# Patient Record
Sex: FEMALE | Race: WHITE | NOT HISPANIC OR LATINO | Employment: FULL TIME | URBAN - METROPOLITAN AREA
[De-identification: names, ages, dates, MRNs, and addresses within clinical notes are randomized per-mention and may not be internally consistent; named-entity substitution may affect disease eponyms.]

---

## 2017-03-20 ENCOUNTER — ALLSCRIPTS OFFICE VISIT (OUTPATIENT)
Dept: OTHER | Facility: OTHER | Age: 57
End: 2017-03-20

## 2017-10-03 DIAGNOSIS — N83.201 CYST OF RIGHT OVARY: ICD-10-CM

## 2017-10-03 DIAGNOSIS — Z12.31 ENCOUNTER FOR SCREENING MAMMOGRAM FOR MALIGNANT NEOPLASM OF BREAST: ICD-10-CM

## 2017-10-03 DIAGNOSIS — R10.2 PELVIC AND PERINEAL PAIN: ICD-10-CM

## 2017-10-03 DIAGNOSIS — R00.2 PALPITATIONS: ICD-10-CM

## 2017-10-03 DIAGNOSIS — F41.9 ANXIETY DISORDER: ICD-10-CM

## 2017-10-03 DIAGNOSIS — E55.9 VITAMIN D DEFICIENCY: ICD-10-CM

## 2017-10-03 DIAGNOSIS — D25.9 LEIOMYOMA OF UTERUS: ICD-10-CM

## 2017-10-03 DIAGNOSIS — R73.01 IMPAIRED FASTING GLUCOSE: ICD-10-CM

## 2017-10-04 ENCOUNTER — GENERIC CONVERSION - ENCOUNTER (OUTPATIENT)
Dept: OTHER | Facility: OTHER | Age: 57
End: 2017-10-04

## 2017-10-10 ENCOUNTER — HOSPITAL ENCOUNTER (OUTPATIENT)
Dept: RADIOLOGY | Facility: HOSPITAL | Age: 57
Discharge: HOME | End: 2017-10-10
Attending: FAMILY MEDICINE
Payer: COMMERCIAL

## 2017-10-10 DIAGNOSIS — Z12.31 ENCOUNTER FOR SCREENING MAMMOGRAM FOR MALIGNANT NEOPLASM OF BREAST: ICD-10-CM

## 2017-10-10 PROCEDURE — G0202 SCR MAMMO BI INCL CAD: HCPCS

## 2017-10-30 ENCOUNTER — GENERIC CONVERSION - ENCOUNTER (OUTPATIENT)
Dept: OTHER | Facility: OTHER | Age: 57
End: 2017-10-30

## 2017-10-30 ENCOUNTER — ALLSCRIPTS OFFICE VISIT (OUTPATIENT)
Dept: OTHER | Facility: OTHER | Age: 57
End: 2017-10-30

## 2017-10-30 LAB
BILIRUB UR QL STRIP: NORMAL
CLARITY UR: NORMAL
COLOR UR: YELLOW
GLUCOSE (HISTORICAL): NORMAL
HGB UR QL STRIP.AUTO: NORMAL
KETONES UR STRIP-MCNC: NORMAL MG/DL
LEUKOCYTE ESTERASE UR QL STRIP: 500
NITRITE UR QL STRIP: NORMAL
PH UR STRIP.AUTO: 5 [PH]
PROT UR STRIP-MCNC: NORMAL MG/DL
SP GR UR STRIP.AUTO: 1.01
UROBILINOGEN UR QL STRIP.AUTO: NORMAL

## 2017-10-31 LAB
CULTURE RESULT (HISTORICAL): NORMAL
INTERPRETATION (HISTORICAL): NORMAL
MISCELLANEOUS LAB TEST RESULT (HISTORICAL): NO GROWTH
NO LAVENDER RECEIVED (HISTORICAL): NORMAL
NO SERUM GEL RECEIVED (HISTORICAL): NORMAL

## 2017-11-01 ENCOUNTER — LAB CONVERSION - ENCOUNTER (OUTPATIENT)
Dept: OTHER | Facility: OTHER | Age: 57
End: 2017-11-01

## 2017-11-01 LAB
CONTAINER TYP (HISTORICAL): NORMAL
FINAL RESOLUTION (HISTORICAL): NORMAL
QUESTION/PROBLEM (HISTORICAL): NORMAL
SPECIMEN STATUS REPORT (HISTORICAL): NORMAL

## 2017-11-02 ENCOUNTER — HOSPITAL ENCOUNTER (OUTPATIENT)
Dept: RADIOLOGY | Facility: HOSPITAL | Age: 57
Discharge: HOME | End: 2017-11-02
Attending: FAMILY MEDICINE
Payer: COMMERCIAL

## 2017-11-02 ENCOUNTER — GENERIC CONVERSION - ENCOUNTER (OUTPATIENT)
Dept: OTHER | Facility: OTHER | Age: 57
End: 2017-11-02

## 2017-11-02 DIAGNOSIS — N83.201 CYST OF RIGHT OVARY: ICD-10-CM

## 2017-11-02 DIAGNOSIS — R10.2 PELVIC AND PERINEAL PAIN: ICD-10-CM

## 2017-11-02 LAB
A/G RATIO (HISTORICAL): 2 (ref 1.2–2.2)
ALBUMIN SERPL BCP-MCNC: 4.6 G/DL (ref 3.5–5.5)
ALP SERPL-CCNC: 104 IU/L (ref 39–117)
ALT SERPL W P-5'-P-CCNC: 17 IU/L (ref 0–32)
AMYLASE (HISTORICAL): 33 U/L (ref 31–124)
AST SERPL W P-5'-P-CCNC: 23 IU/L (ref 0–40)
BASOPHILS # BLD AUTO: 0 X10E3/UL (ref 0–0.2)
BASOPHILS # BLD AUTO: 1 %
BILIRUB SERPL-MCNC: 0.4 MG/DL (ref 0–1.2)
BUN SERPL-MCNC: 13 MG/DL (ref 6–24)
BUN/CREA RATIO (HISTORICAL): 23 (ref 9–23)
CALCIUM SERPL-MCNC: 9.5 MG/DL (ref 8.7–10.2)
CHLORIDE SERPL-SCNC: 101 MMOL/L (ref 96–106)
CHOLEST SERPL-MCNC: 205 MG/DL (ref 100–199)
CHOLEST/HDLC SERPL: 4.3 RATIO UNITS (ref 0–4.4)
CO2 SERPL-SCNC: 26 MMOL/L (ref 18–29)
CREAT SERPL-MCNC: 0.57 MG/DL (ref 0.57–1)
DEPRECATED RDW RBC AUTO: 12.7 % (ref 12.3–15.4)
EGFR AFRICAN AMERICAN (HISTORICAL): 120 ML/MIN/1.73
EGFR-AMERICAN CALC (HISTORICAL): 104 ML/MIN/1.73
EOSINOPHIL # BLD AUTO: 0.1 X10E3/UL (ref 0–0.4)
EOSINOPHIL # BLD AUTO: 3 %
GLUCOSE SERPL-MCNC: 107 MG/DL (ref 65–99)
HBA1C MFR BLD HPLC: 5.3 % (ref 4.8–5.6)
HCT VFR BLD AUTO: 40.4 % (ref 34–46.6)
HDLC SERPL-MCNC: 48 MG/DL
HGB BLD-MCNC: 13.6 G/DL (ref 11.1–15.9)
IMM.GRANULOCYTES (CD4/8) (HISTORICAL): 0 %
IMM.GRANULOCYTES (CD4/8) (HISTORICAL): 0 X10E3/UL (ref 0–0.1)
LDLC SERPL CALC-MCNC: 129 MG/DL (ref 0–99)
LIPASE SERPL-CCNC: 19 U/L (ref 14–72)
LYMPHOCYTES # BLD AUTO: 1.8 X10E3/UL (ref 0.7–3.1)
LYMPHOCYTES # BLD AUTO: 35 %
MCH RBC QN AUTO: 30.8 PG (ref 26.6–33)
MCHC RBC AUTO-ENTMCNC: 33.7 G/DL (ref 31.5–35.7)
MCV RBC AUTO: 92 FL (ref 79–97)
MONOCYTES # BLD AUTO: 0.4 X10E3/UL (ref 0.1–0.9)
MONOCYTES (HISTORICAL): 8 %
NEUTROPHILS # BLD AUTO: 2.9 X10E3/UL (ref 1.4–7)
NEUTROPHILS # BLD AUTO: 53 %
PLATELET # BLD AUTO: 311 X10E3/UL (ref 150–379)
POTASSIUM SERPL-SCNC: 4.4 MMOL/L (ref 3.5–5.2)
RBC (HISTORICAL): 4.41 X10E6/UL (ref 3.77–5.28)
SODIUM SERPL-SCNC: 143 MMOL/L (ref 134–144)
TOT. GLOBULIN, SERUM (HISTORICAL): 2.3 G/DL (ref 1.5–4.5)
TOTAL PROTEIN (HISTORICAL): 6.9 G/DL (ref 6–8.5)
TRIGL SERPL-MCNC: 142 MG/DL (ref 0–149)
VLDLC SERPL CALC-MCNC: 28 MG/DL (ref 5–40)
WBC # BLD AUTO: 5.3 X10E3/UL (ref 3.4–10.8)

## 2017-11-02 PROCEDURE — 76830 TRANSVAGINAL US NON-OB: CPT

## 2017-11-02 PROCEDURE — 76856 US EXAM PELVIC COMPLETE: CPT

## 2017-11-03 ENCOUNTER — LAB CONVERSION - ENCOUNTER (OUTPATIENT)
Dept: OTHER | Facility: OTHER | Age: 57
End: 2017-11-03

## 2017-11-03 LAB
25(OH)D3 SERPL-MCNC: 42.8 NG/ML (ref 30–100)
ADDITIONAL INFORMATION (HISTORICAL): NORMAL
ADEQUACY: (HISTORICAL): NORMAL
COMMENT (HISTORICAL): NORMAL
CYTOTECHNOLOGIST: (HISTORICAL): NORMAL
INTERPRETATION (HISTORICAL): NORMAL
INTERPRETATION (HISTORICAL): NORMAL
LMP (HISTORICAL): NORMAL
PREV. BX: (HISTORICAL): NORMAL
PREV. PAP (HISTORICAL): NORMAL
SOURCE (HISTORICAL): NORMAL
TSH SERPL DL<=0.05 MIU/L-ACNC: 1.53 UIU/ML (ref 0.45–4.5)

## 2017-11-26 ENCOUNTER — GENERIC CONVERSION - ENCOUNTER (OUTPATIENT)
Dept: OTHER | Facility: OTHER | Age: 57
End: 2017-11-26

## 2018-01-10 NOTE — RESULT NOTES
Verified Results  * MAMMO SCREENING BILATERAL W CAD [id number] 04:12PM Zbigniew Piña Order Number: [id number]    - Patient Instructions: To schedule this appointment, please contact Central Scheduling at [phone]  Do not wear any perfume, powder, lotion or deodorant on breast or underarm area  Please bring your doctors order, referral (if needed) and insurance information with you on the day of the test  Failure to bring this information may result in this test being rescheduled  Arrive 15 minutes prior to your appointment time to register  On the day of your test, please bring any prior mammogram or breast studies with you that were not performed at a [facility]  Failure to bring prior exams may result in your test needing to be rescheduled  Test Name Result Flag Reference   MAMMO SCREENING BILATERAL W CAD (Report)     Patient History:   Patient has history of other cancer at age 44  Family history of unknown cancer at age 68 in mother, unknown    cancer at age 72 in maternal grandfather  Benign FNA biopsy of the right breast, 2009  Patient's BMI is 29 0  Reason for exam: screening, asymptomatic  Screening     Mammo Screening Bilateral W CAD: October 10, 2017 - Check In #:    [de-identified]   Bilateral CC and MLO view(s) were taken  Technologist: TONYA Zuluaga   Prior study comparison: September 30, 2016, mammo screening    bilateral W CAD performed at [place] [address]  September 29, 2015, [facility] screening bilateral digital demetria    performed at [place] [address]  September 26, 2014, bilateral screening mammogram performed at [place] [address]  September 25, 2013, bilateral    screening mammogram performed at [place] [address]  September 21, 2012, bilateral screening mammogram    performed at [place] [address]  The breast tissue is almost entirely fat   No dominant soft    tissue mass, architectural distortion or suspicious    calcifications are noted  The skin and nipple contours are    within normal limits  No evidence of malignancy  No significant   change when compared to the prior studies  1  No evidence of malignancy  2  No significant change when compared with the prior study  ACR BI-RADSï¾® Assessments: BiRad:1 - Negative     Recommendation:   Routine screening mammogram of both breasts in 1 year  Analyzed by CAD     The patient is scheduled in a reminder system for screening    mammography  8-10% of cancers will be missed on mammography  Management of a    palpable abnormality must be based on clinical grounds  Patients   will be notified of their results via letter from our facility  Accredited by [employer] and FDA       Transcription Location: [facility] 98: [id number]     Risk Value(s):   Tyrer-Cuzick 10 Year: 3 800%, Tyrer-Cuzick Lifetime: 11 900%,    Myriad Table: 1 5%, GRABIEL 5 Year: 1 9%, NCI Lifetime: 12 2%   Signed by:   Mojgan Yancey MD   10/10/17
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
## 2018-01-11 NOTE — PROGRESS NOTES
Chief Complaint  3rd Hep B injection given  bh/lpn      Active Problems    1  _ (789 0)   2  _ (923 00)   3  Anxiety (300 00) (F41 9)   4  Benign paroxysmal vertigo, unspecified laterality (386 11) (H81 10)   5  Body mass index (BMI) of 37 0 to 37 9 in adult (V85 37) (Z68 37)   6  Borderline hyperlipidemia (272 4) (E78 5)   7  Contusion Of The Leg With Intact Skin Surface (924 10)   8  Encounter for screening for malignant neoplasm of cervix (V76 2) (Z12 4)   9  Encounter for screening mammogram for malignant neoplasm of breast (V76 12)   (Z12 31)   10  Female pelvic pain (625 9) (R10 2)   11  Follicular cyst of ovary (620 0) (N83 00)   12  Impaired fasting glucose (790 21) (R73 01)   13  Leiomyoma of uterus (218 9) (D25 9)   14  Need for hepatitis B vaccination (V05 3) (Z23)   15  Need for prophylactic vaccination and inoculation against influenza (V04 81) (Z23)   16  Other Neurological Disorders (781 99)   17  Palpitations (785 1) (R00 2)   18  Vitamin D deficiency (268 9) (E55 9)   19  Well adult on routine health check (V70 0) (Z00 00)    Current Meds   1  ALPRAZolam 0 25 MG Oral Tablet Dispersible; Take 1 tablet daily when needed; Therapy: (Recorded:29Xmp2915) to Recorded    Allergies    1   No Known Drug Allergies    Plan  Need for hepatitis B vaccination    · Hepatitis B    Signatures   Electronically signed by : Marilu Lockhart DO; Dec 12 2016  8:57AM EST                       (Author)

## 2018-01-12 NOTE — RESULT NOTES
Discussion/Summary   overall results good-please call w further questions/concerns-Best Regards-Dr Gilmore Innocent     Verified Results  * US PELVIS COMPLETE [facility] AND TRANSVAGINAL) [id number] 09:52AM Enmanuel Larios Order Number: [id number]    - Patient Instructions: To schedule this appointment, please contact Central Scheduling at [phone]  Test Name Result Flag Reference   US PELVIS COMPLETE (TRANSABDOMINAL AND TRANSVAGINAL) (Report)     PELVIC ULTRASOUND, COMPLETE     INDICATION: Right-sided pain  LMP was 6-8 months ago      COMPARISON: 7/22/2016     TECHNIQUE:  Transabdominal pelvic ultrasound was performed in sagittal and transverse planes with a curvilinear transducer  Additional transvaginal imaging was performed to better evaluate the endometrium and ovaries  Imaging included volumetric    sweeps as well as traditional still imaging technique  FINDINGS:     UTERUS:   The uterus is normal in position, measuring 10 9 x 3 3 x 6 3 cm  Contour and echotexture appear normal  On the right side of the uterus and intramural fibroid measures 2 1 x 1 7 x 1 9 cm, previously 1 5 x 1 3 x 1 5     The cervix shows no suspicious abnormality  ENDOMETRIUM:    Normal caliber of 6 mm  Homogenous and normal in appearance  OVARIES/ADNEXA:   Right ovary: 2 3 x 1 0 x 2 1 cm  Seen on the transabdominal images  No suspicious right ovarian abnormality  Doppler flow within normal limits  Left ovary: 2 1 x 1 0 x 1 8 cm  No suspicious left ovarian abnormality  Doppler flow within normal limits  No suspicious adnexal mass or loculated collections  There is no free fluid  IMPRESSION:        1  Fibroid uterus  2  Normal examination of the ovaries            Workstation performed: [id number]     Signed by:   Sandy Can MD   11/3/17     (1) AMYLASE [id number] 08:46AM Venetta Antes     Test Name Result Flag Reference   Amylase, Serum 33 U/L       (1) CBC/PLT/DIFF [id number] 08:46AM [place] [employer]     Test Name Result Flag Reference   WBC 5 3 x10E3/uL  3 4-10 8   RBC 4 41 x10E6/uL  3 77-5 28   Hemoglobin 13 6 g/dL  11 1-15 9   Hematocrit 40 4 %  34 0-46  6   MCV 92 fL  79-97   MCH 30 8 pg  26 6-33 0   MCHC 33 7 g/dL  31 5-35 7   RDW 12 7 %  12 3-15 4   Platelets 893 H06R4/QF  150-379   Neutrophils 53 %  Not Estab  Lymphs 35 %  Not Estab  Monocytes 8 %  Not Estab  Eos 3 %  Not Estab  Basos 1 %  Not Estab  Neutrophils (Absolute) 2 9 x10E3/uL  1 4-7 0   Lymphs (Absolute) 1 8 x10E3/uL  0 7-3 1   Monocytes(Absolute) 0 4 x10E3/uL  0 1-0 9   Eos (Absolute) 0 1 x10E3/uL  0 0-0 4   Baso (Absolute) 0 0 x10E3/uL  0 0-0 2   Immature Granulocytes 0 %  Not Estab     Immature Grans (Abs) 0 0 x10E3/uL  0 0-0 1     (1) COMPREHENSIVE METABOLIC PANEL [id number] 31:17SS [place] [employer]     Test Name Result Flag Reference   Glucose, Serum 107 mg/dL H 65-99   BUN 13 mg/dL  6-24   Creatinine, Serum 0 57 mg/dL  0 57-1 00   BUN/Creatinine Ratio 23  9-23   Sodium, Serum 143 mmol/L  134-144   Potassium, Serum 4 4 mmol/L  3 5-5 2   Chloride, Serum 101 mmol/L     Carbon Dioxide, Total 26 mmol/L  18-29   Calcium, Serum 9 5 mg/dL  8 7-10 2   Protein, Total, Serum 6 9 g/dL  6 0-8 5   Albumin, Serum 4 6 g/dL  3 5-5 5   Globulin, Total 2 3 g/dL  1 5-4 5   A/G Ratio 2 0  1 2-2 2   Bilirubin, Total 0 4 mg/dL  0 0-1 2   Alkaline Phosphatase, S 104 IU/L     AST (SGOT) 23 IU/L  0-40   ALT (SGPT) 17 IU/L  0-32   eGFR If NonAfricn Am 104 mL/min/1 73  >59   eGFR If Africn Am 120 mL/min/1 73  >59     (1) HEMOGLOBIN A1C [id number] 08:46AM [employer]     Test Name Result Flag Reference   Hemoglobin A1c 5 3 %  4 8-5 6   Pre-diabetes: 5 7 - 6 4           Diabetes: >6 4           Glycemic control for adults with diabetes: <7 0     (1) LIPASE [id number] 08:46AM [employer]     Test Name Result Flag Reference   Lipase, Serum 19 U/L  14-72     (1) LIPID PANEL, FASTING [id number] 08: 46AM Francesca Villegas     Test Name Result Flag Reference   Cholesterol, Total 205 mg/dL H 100-199   Triglycerides 142 mg/dL  0-149   HDL Cholesterol 48 mg/dL  >39   VLDL Cholesterol Kev 28 mg/dL  5-40   LDL Cholesterol Calc 129 mg/dL H 0-99   T  Chol/HDL Ratio 4 3 ratio units  0 0-4 4   T  Chol/HDL Ratio                                                             Men  Women                                               1/2 Avg  Risk  3 4    3 3                                                   Avg Risk  5 0    4 4                                                2X Avg  Risk  9 6    7 1                                                3X Avg  Risk 23 4   11 0     (1) VITAMIN D 25-HYDROXY [id number] 08:46AM Francesca Villegas     Test Name Result Flag Reference   Vitamin D, 25-Hydroxy 42 8 ng/mL  30 0-100 0   Vitamin D deficiency has been defined by the [address] practice guideline as a  level of serum 25-OH vitamin D less than 20 ng/mL (1,2)  The Endocrine Society went on to further define vitamin D  insufficiency as a level between 21 and 29 ng/mL (2)  1  IOM ([place] of Medicine)  2010  Dietary reference     intakes for calcium and D  [facility]: The     [employer]  2  Kajal MF, Denise FONG, Amrit BARAJAS, et al      Evaluation, treatment, and prevention of vitamin D     deficiency: an Endocrine Society clinical practice     guideline  JCEM  2011 Jul; 96(7):1911-30  (LC) TSH Rfx on Abnormal to Free T4 [id number] 08:46AM Francesca Villegas     Test Name Result Flag Reference   TSH 1 530 uIU/mL  0 450-4 500     [facility]) Cardiovascular Risk Assessment [id number] 08:46AM Francesca Villegas     Test Name Result Flag Reference   Interpretation Note     Medical Director's Note: Patient First Name has been  corrected on 11/3/2017, was [facility] and now is Harjeet Jones   Please  review this report in its entirety, since changes to patient  demographics may affect result interpretation(s) and/or  treatment/follow-up suggestions   -------------------------------  CARDIOVASCULAR REPORT:  -------------------------------  Current available clinical information suggests the  patient's risk is at least LOW  One major CHD risk factor is  present (age over 54)  If the patient has CHD or a CHD risk  equivalent, the risk category is high  If patient does not  have CHD or a CHD risk equivalent, consider use of the  Pooled Cohort Equations to estimate 10-year CVD risk, as  individuals with greater than 7 5% risk may warrant more  intensive therapy  The calculator can be found at:  http://tools  cardiosource org/SXYHQ-Hhfk-Ysfekywjj/  -  Insulin resistance, obesity, excessive alcohol use, smoking,  nephrotic syndrome, liver disease, and certain medications  can cause secondary dyslipidemia  Consider evaluation if  clinically indicated  -  Therapeutic lifestyle changes are always valuable to achieve  optimal blood lipid status (diet, exercise, weight  management)  -------------------------------  LIPID MANAGEMENT  Select one patient risk category based upon medical history  and clinical judgment  Additional risk factors such as  personal or family history of premature CHD, smoking, and  hypertension modify a patient's goals of therapy  In CVD  prevention, the intensity of therapy should be adjusted to  the level of patient risk  MODERATE intensity statin therapy  generally results in an average LDL-C reduction of 30% to  less than 50% from the untreated baseline  Examples include  (daily doses): atorvastatin 10-20 mg, rosuvastatin 5-10 mg,  simvastatin 20-40 mg, pravastatin 40-80 mg, lovastatin 40  mg  HIGH intensity statin therapy generally results in an  average LDL-C reduction of 50% or more from the untreated  baseline   Examples include (daily doses): atorvastatin 40-80  mg and rosuvastatin 20 mg   -------------------------------  LOW RISK ASSESSMENT AND TREATMENT SUGGESTIONS  -------------------------------  LDL-C is acceptable, was 114 and now is 129 mg/dL  Non-HDL  Cholesterol is acceptable, was 129 and now is 157 mg/dL  -  Considerations for use of statin therapy include family  history of premature atherosclerotic disease, elevated  coronary artery calcium score, ankle-brachial index < 0 9,  elevated CRP, or elevated 10-year or lifetime CVD risk   -------------------------------  INTERMEDIATE RISK ASSESSMENT AND TREATMENT SUGGESTIONS  -------------------------------  LDL-C is acceptable, was 114 and now is 129 mg/dL  Non-HDL  Cholesterol is acceptable, was 129 and now is 157 mg/dL  -  Consider measurement of LDL particle number or Apo B to  adjudicate need for further LDL lowering therapy  Consider  beginning or increasing statin  Factors that may influence  statin use include family history of premature  atherosclerotic disease, elevated coronary artery calcium  score, ankle-brachial index < 0 9, elevated CRP, or elevated  10-year or lifetime CVD risk  If statin cannot be tolerated  or increased, alternatives include use of an intestinal  agent (ezetimibe or bile acid sequestrant) or niacin   -------------------------------  HIGH RISK ASSESSMENT AND TREATMENT SUGGESTIONS  -------------------------------  LDL-C is borderline high, was 114 and now is 129 mg/dL  Non-HDL Cholesterol is borderline high, was 129 and now is  157 mg/dL  -  Begin statin  If statin already in use, consider increasing  dose to achieve at least a 50% LDL reduction from baseline  Moderate or high intensity statin is preferred   If statin  cannot be tolerated or increased, alternatives include use  of an intestinal agent (ezetimibe or bile acid sequestrant)  or niacin   -------------------------------  DISCLAIMER  These assessments and treatment suggestions are provided as  a convenience in support of the physician-patient  relationship and are not intended to replace the physician's  clinical judgment  They are derived from national guidelines  in addition to other evidence and expert opinion  The  clinician should consider this information within the  context of clinical opinion and the individual patient  SEE GUIDANCE FOR CARDIOVASCULAR REPORT: Richa Cross et al  2013  ACC/AHA guideline on the treatment of blood cholesterol to  reduce atherosclerotic cardiovascular risk in adults: a  report of the [employer] of Cardiology/American Heart  Association Task Force on Practice Guidelines  Circulation  2014; 129 (suppl 2): S1?S45; Lanieois et al  Clin Chem 2009;  55(3):407-419; Lux et al  Diabetes Care 2008;  31(4):811-82  PDF Image   [facility]) Urine Culture, Routine [id number] 12:00AM Napolean Darner     Test Name Result Flag Reference   Urine Culture, Routine Final report     Result 1 No growth       TEST IN QUESTION - CYTOLOGY [id number] 12:00AM Napolean Darner     Test Name Result Flag Reference   STATUS FINAL     CONTAINER TYPE: TP     QUESTION/PROBLEM CLARIFY HX     FINAL RESOLUTION ENDOCX       (Q) THINPREP TIS PAP RFX HPV [id number] 12:00AM Napolean [facility]     Test Name Result Flag Reference   CLINICAL INFORMATION: NONE GIVEN     LMP: NONE GIVEN     PREV  PAP: NONE GIVEN     PREV  BX: NONE GIVEN     SOURCE: ENDOCERVIX     STATEMENT OF ADEQUACY:      Satisfactory for evaluation  Endocervical/transformation zone component  present  Age and/or menstrual status not provided   INTERPRETATION/RESULT:      Negative for intraepithelial lesion or malignancy  COMMENT:      This Pap test has been evaluated with computer  assisted technology  CYTOTECHNOLOGIST:      [id number] Asencio(ASCP)  CT screening location: [address] [facility], [address] [place] [address]  Encounter for screening for malignant neoplasm of cervix    · (Q) THINPREP TIS PAP RFX HPV ; every 1 year;  Last 28LQL4418; Next 33CLF4724;  Status:Active

## 2018-01-12 NOTE — RESULT NOTES
Verified Results  (LC) Vitamin D, 25-Hydroxy, Total 25Pgf7471 08:20AM [facility]     Test Name Result Flag Reference   Vitamin D, 25-Hydroxy, Serum 36 ng/mL     Reference Range:   All Ages: Target levels 30 - 100     TEST IN QUESTION - CYTOLOGY [id number] 12:00AM [facility]     Test Name Result Flag Reference   STATUS FINAL     CONTAINER TYPE: TP     QUESTION/PROBLEM CLINICAL HX/NAME     FINAL RESOLUTION      SOURCE=CERVICAL                         CORRECT LAST NAME IS Alexander Jane
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
## 2018-01-13 VITALS
HEART RATE: 100 BPM | OXYGEN SATURATION: 98 % | SYSTOLIC BLOOD PRESSURE: 134 MMHG | BODY MASS INDEX: 39.85 KG/M2 | HEIGHT: 60 IN | WEIGHT: 203 LBS | RESPIRATION RATE: 16 BRPM | TEMPERATURE: 98 F | DIASTOLIC BLOOD PRESSURE: 84 MMHG

## 2018-01-13 VITALS
BODY MASS INDEX: 38.87 KG/M2 | RESPIRATION RATE: 16 BRPM | DIASTOLIC BLOOD PRESSURE: 88 MMHG | TEMPERATURE: 98.2 F | SYSTOLIC BLOOD PRESSURE: 126 MMHG | HEART RATE: 92 BPM | WEIGHT: 198 LBS | HEIGHT: 60 IN

## 2018-01-13 NOTE — PROGRESS NOTES
Chief Complaint  Patient presents for Hepatitis B injection into right deltoid  nil/lpn      Active Problems    1  _ (789 0)   2  _ (923 00)   3  Anxiety (300 00) (F41 9)   4  Benign paroxysmal vertigo, unspecified laterality (386 11) (H81 10)   5  Body mass index (BMI) of 37 0 to 37 9 in adult (V85 37) (Z68 37)   6  Borderline hyperlipidemia (272 4) (E78 5)   7  Contusion Of The Leg With Intact Skin Surface (924 10)   8  Encounter for screening for malignant neoplasm of cervix (V76 2) (Z12 4)   9  Encounter for screening mammogram for malignant neoplasm of breast (V76 12)   (Z12 31)   10  Female pelvic pain (625 9) (R10 2)   11  Follicular cyst of ovary (620 0) (N83 0)   12  Impaired fasting glucose (790 21) (R73 01)   13  Leiomyoma of uterus (218 9) (D25 9)   14  Need for hepatitis B vaccination (V05 3) (Z23)   15  Need for prophylactic vaccination and inoculation against influenza (V04 81) (Z23)   16  Other Neurological Disorders (781 99)   17  Palpitations (785 1) (R00 2)   18  Vitamin D deficiency (268 9) (E55 9)   19  Well adult on routine health check (V70 0) (Z00 00)    Current Meds   1  ALPRAZolam 0 25 MG Oral Tablet Dispersible; Take 1 tablet daily when needed; Therapy: (Recorded:86Kan8788) to Recorded    Allergies    1   No Known Drug Allergies    Plan  Need for hepatitis B vaccination    · Hepatitis B    Future Appointments    Date/Time Provider Specialty Site   12/09/2016 01:00 PM [facility], Nurse Schedule  [facility]     Signatures   Electronically signed by : COLLINS Davila ; Aug 10 2016  3:01PM EST                       (Author)

## 2018-01-14 NOTE — RESULT NOTES
Verified Results  * US PELVIS COMPLETE (TRANSABDOMINAL AND TRANSVAGINAL) [id number] 10:26AM Mia Slider     Test Name Result Flag Reference   US PELVIS COMPLETE (TRANSABDOMINAL AND TRANSVAGINAL) (Report)     PELVIC ULTRASOUND, COMPLETE     INDICATION: Pelvic pain for years  Patient is postmenopausal for approximately one year  COMPARISON: 8/4/2014     TECHNIQUE:  Transabdominal pelvic ultrasound was performed in sagittal and transverse planes with a curvilinear transducer  Additional transvaginal imaging was performed to better evaluate the endometrium and ovaries  Imaging included volumetric    sweeps as well as traditional still imaging technique  FINDINGS:     UTERUS:   The uterus is normal in position, measuring 11 4 x 4 3 x 5 4 cm  Contour and echotexture appear normal  In the anterior right side of the uterus a fibroid measures 1 5 cm in diameter  The cervix shows no suspicious abnormality  ENDOMETRIUM:    Normal caliber of 10 mm  Homogenous and normal in appearance  OVARIES/ADNEXA:   Right ovary: 2 5 x 3 0 x 1 1 cm  No suspicious right ovarian abnormality  1 6 x 2 1 x 2 4 cm cyst    Doppler flow within normal limits  Left ovary: 1 7 x 1 2 x 2 0 cm  No suspicious left ovarian abnormality  Doppler flow within normal limits  No suspicious adnexal mass or loculated collections  There is no free fluid  IMPRESSION:      Fibroid uterus  Right ovarian cyst           Workstation performed: [id number]     Signed by:   Silvia Mitchell MD   7/25/16     (Q) THINPREP PAP [id number] 12:00AM Mia Slider     Test Name Result Flag Reference   CLINICAL INFORMATION:      NONE GIVEN   LMP:      NONE GIVEN   PREV  PAP:      NONE GIVEN   PREV  BX:      NONE GIVEN   SOURCE:      CERVICAL   STATEMENT OF ADEQUACY:      Satisfactory for evaluation  Endocervical/transformation zone component  present    Age and/or menstrual status not provided   INTERPRETATION/RESULT: Negative for intraepithelial lesion or malignancy  CYTOTECHNOLOGIST:      ENRIQUE Ivy(ASCP)  CT screening location: [address], [address] [facility]  Encounter for screening for malignant neoplasm of cervix    · (Q) THINPREP TIS PAP RFX HPV ; every 1 year;  Last [id number]; Status:Active

## 2018-01-15 NOTE — RESULT NOTES
Verified Results  * MAMMO SCREENING BILATERAL W CAD [id number] 02:15PM Domenica Bazan     Test Name Result Flag Reference   MAMMO SCREENING BILATERAL W CAD (Report)     Patient History:   Patient has history of other cancer at age 44  Family history of unknown cancer in maternal grandfather at age    72 and unknown cancer in mother at age 68  Benign FNA biopsy of the right breast, 2009  Patient's BMI is 29 0  Reason for exam: screening (asymptomatic)  Screening     Mammo Screening Bilateral W CAD: September 30, 2016 - Check In #:   [de-identified]   Bilateral CC and MLO view(s) were taken  Technologist: GEMMA Burt   Prior study comparison: September 29, 2015, [facility] screening    bilateral digital demetria performed at [place] [address]  September 26, 2014, bilateral screening mammogram    performed at [place] [address]  September 25, 2013, bilateral screening mammogram performed at [place] [address]  September 21, 2012, bilateral    screening mammogram performed at [place] [address]  September 20, 2011, right breast diagnostic mammogram    performed at [place] [address]  September 20, 2011, bilateral ultrasound performed at [place] [address]  September 12, 2011, right breast screening    mammogram performed at [place] [address]  September 9, 2010, bilateral screening mammogram performed at [place] [address]  August 28, 2009, left breast    diagnostic mammogram performed at [place] [address]  The breast tissue is almost entirely fat  Bilateral digital    mammography is performed  No dominant soft tissue mass,    architectural distortion or suspicious calcifications are noted  The skin and nipple contours are within normal limits  Scattered benign appearing calcifications are noted   Asymmetric    tissue in the right upper outer breast is again seen which has    been intermittent dating back to August 2009  No evidence of    malignancy  No significant changes when compared to prior    studies  1  No evidence of malignancy  2  No significant change when compared with the prior study  ASSESSMENT: BiRad:2 - Benign     Recommendation:   Routine screening mammogram of both breasts in 1 year  A reminder letter will be scheduled   Analyzed by CAD     8-10% of cancers will be missed on mammography  Management of a    palpable abnormality must be based on clinical grounds  Patients   will be notified of their results via letter from our facility  Accredited by [employer] of [employer] and FDA       Transcription Location:  [address]: [id number]     Risk Value(s):   Tyrer-Cuzick 10 Year: 3 718%, Tyrer-Cuzick Lifetime: 12 225%,    Myriad Table: 1 5%, GRABIEL 5 Year: 1 8%, NCI Lifetime: 12 4%   Signed by:   Chapo Sims MD   10/4/16

## 2018-01-15 NOTE — MISCELLANEOUS
Message  Return to work or school:   Leann Estrella is under my professional care  She was seen in my office on 10/30/2017        Dr Ivett Marino/ ashley/van        Signatures   Electronically signed by : COLLINS Nascimento ; Nov 2 2017  7:42AM EST                       (Author)

## 2018-01-16 NOTE — PROGRESS NOTES
Chief Complaint  Patient presents for annual PPD  nil/lpn      Active Problems    1  _ (789 0)   2  _ (923 00)   3  Acute bronchitis (466 0) (J20 9)   4  Anxiety (300 00) (F41 9)   5  Benign paroxysmal vertigo, unspecified laterality (386 11) (H81 10)   6  Contusion Of The Leg With Intact Skin Surface (924 10)   7  Encounter for PPD test (V74 1) (Z11 1)   8  Encounter for screening for malignant neoplasm of cervix (V76 2) (Z12 4)   9  Encounter for screening mammogram for malignant neoplasm of breast (V76 12)   (Z12 31)   10  Follicular cyst of ovary (620 0) (N83 0)   11  Impaired fasting glucose (790 21) (R73 01)   12  Leiomyoma of uterus (218 9) (D25 9)   13  Need for prophylactic vaccination and inoculation against influenza (V04 81) (Z23)   14  Other Neurological Disorders (781 99)   15  Palpitations (785 1) (R00 2)   16  Sore throat (462) (J02 9)   17  Vitamin D deficiency (268 9) (E55 9)   18  Well adult on routine health check (V70 0) (Z00 00)    Current Meds   1  Azithromycin 250 MG Oral Tablet; TAKE 2 TABLETS ON DAY 1 THEN TAKE 1 TABLET A   DAY FOR 4 DAYS; Therapy: 18Ohb4303 to (Last Rx:69Nre6303) Ordered   2  Guaifenesin-Codeine 100-10 MG/5ML Oral Solution; TAKE 5 ML EVERY 4 TO 6 HOURS   AS NEEDED FOR COUGH; Therapy: 94Gyc1977 to (Last Rx:95Ylb0906) Ordered    Allergies    1  No Known Drug Allergies    Plan  Encounter for PPD test    · PPD    Future Appointments    Date/Time Provider Specialty Site   07/13/2016 01:30 PM COLLINS Cordova   [address] [place] [address]   07/01/2016 09:00 AM [facility], Nurse Schedule  [facility]     Signatures   Electronically signed by : COLLINS Aponte ; Jun 28 2016  4:58PM EST                       (Author)

## 2018-01-17 NOTE — PROGRESS NOTES
Chief Complaint  Patient here for PPD read, reading negative 0 00mm bh/lpn      Active Problems    1  _ (789 0)   2  _ (923 00)   3  Acute bronchitis (466 0) (J20 9)   4  Anxiety (300 00) (F41 9)   5  Benign paroxysmal vertigo, unspecified laterality (386 11) (H81 10)   6  Contusion Of The Leg With Intact Skin Surface (924 10)   7  Encounter for PPD test (V74 1) (Z11 1)   8  Encounter for screening for malignant neoplasm of cervix (V76 2) (Z12 4)   9  Encounter for screening mammogram for malignant neoplasm of breast (V76 12)   (Z12 31)   10  Follicular cyst of ovary (620 0) (N83 0)   11  Impaired fasting glucose (790 21) (R73 01)   12  Leiomyoma of uterus (218 9) (D25 9)   13  Need for prophylactic vaccination and inoculation against influenza (V04 81) (Z23)   14  Other Neurological Disorders (781 99)   15  Palpitations (785 1) (R00 2)   16  Sore throat (462) (J02 9)   17  Vitamin D deficiency (268 9) (E55 9)   18  Well adult on routine health check (V70 0) (Z00 00)    Current Meds   1  Azithromycin 250 MG Oral Tablet; TAKE 2 TABLETS ON DAY 1 THEN TAKE 1 TABLET A   DAY FOR 4 DAYS; Therapy: 63Taa6199 to (Last Rx:34Vhh5737) Ordered   2  Guaifenesin-Codeine 100-10 MG/5ML Oral Solution; TAKE 5 ML EVERY 4 TO 6 HOURS   AS NEEDED FOR COUGH; Therapy: 48Owq2032 to (Last Rx:05Hjt6115) Ordered    Allergies    1  No Known Drug Allergies    Plan  Encounter for PPD test    · PPD    Future Appointments    Date/Time Provider Specialty Site   07/13/2016 01:30 PM COLLINS Noel   [facility] [address]     Signatures   Electronically signed by : COLLINS Starr ; Jul 1 2016  8:36AM EST                       (Author)

## 2018-01-18 NOTE — RESULT NOTES
Verified Results  Urine Dip Automated- POC [id number] 01:17PM Megan Gomez     Test Name Result Flag Reference   Color Yellow     Clarity Transparent     Leukocytes 500     Nitrite neg     Blood neg     Bilirubin neg     Urobilinogen norm     Protein neg     Ph 5     Specific Gravity 1 010     Ketone neg     Glucose norm       (LC) No Serum Gel Received [id number] 12:00AM Megan Gomez     Test Name Result Flag Reference   No Serum Gel Received NG6     No serum gel received  TEST:  [id number]  Comp  Metabolic Panel (14)               933640  Lipid Panel w/ Chol/HDL Ratio               353691  Vitamin D, 25-Hydroxy               552207  Amylase, Serum               466681  Lipase, Serum     (LC) NO Lavender Received 25BJX4437 12:00AM Megan Gomez     Test Name Result Flag Reference   No Lavender Received NOLAV     No lavender top tube submitted  TEST:  [id number]  CBC With Differential/Platelet               709548  Hemoglobin A1c     (LC) Cardiovascular Risk Assessment 30Oct2017 12:00AM Megan Gomez     Test Name Result Flag Reference   Interpretation Not applicable     A [employer] interpretive report has not been generated  since ordered tests are not currently relevant to the  program    PDF Image Not applicable         Plan  Anxiety, BMI 38 0-38 9,adult, Borderline hyperlipidemia, Female pelvic pain, Impaired  fasting glucose, Leiomyoma of uterus, Palpitations, Right ovarian cyst, Vitamin  D deficiency    · (1) LIPID PANEL, FASTING; Status:Active; Requested ZGT:89JZR3754; Anxiety, BMI 38 0-38 9,adult, Female pelvic pain, Impaired fasting glucose, Leiomyoma  of uterus, Palpitations, Right ovarian cyst, Vitamin D deficiency    · (1) AMYLASE; Status:Active; Requested for:30Oct2017;    · (1) CBC/PLT/DIFF; Status:Active; Requested for:30Oct2017;    · (1) COMPREHENSIVE METABOLIC PANEL; Status:Active; Requested for:30Oct2017;    · (1) HEMOGLOBIN A1C; Status:Active;  Requested GQK:55LOL8449; · (1) LIPASE; Status:Active; Requested for:30Oct2017;    · (1) TSH WITH FT4 REFLEX; Status:Active; Requested for:30Oct2017;    · (1) VITAMIN D 25-HYDROXY; Status:Active; Requested for:30Oct2017;   Dysuria, Female pelvic pain    · (LC) Urine Culture, Routine; Status:Hold For - Required information; Requested  for:30Oct2017;   Encounter for screening for malignant neoplasm of cervix    · (Q) THIN PREP TIS PAP RFX HPV; Status:Active; Requested for:30Oct2017;    · (Q) THINPREP TIS PAP RFX HPV; Status:Canceled;   Female pelvic pain, Right ovarian cyst    · * US PELVIS COMPLETE (TRANSABDOMINAL AND TRANSVAGINAL); Status:Hold For -  Scheduling; Requested for:30Oct2017;   Generalized abdominal pain    · Urine Dip Automated- POC; Status:Complete;   Done: 11RRG4446 01:17PM  Need for prophylactic vaccination and inoculation against influenza    · Influenza   · Influenza ; every 1 year;  Last 30Oct2017; Next 27FCF5757; Status:Active  Onychomycosis of toenail    · Ciclopirox 8 % External Solution (Penlac); apply qd to affected nails

## 2018-05-21 ENCOUNTER — OFFICE VISIT (OUTPATIENT)
Dept: FAMILY MEDICINE CLINIC | Facility: CLINIC | Age: 58
End: 2018-05-21
Payer: COMMERCIAL

## 2018-05-21 VITALS
DIASTOLIC BLOOD PRESSURE: 90 MMHG | TEMPERATURE: 98.1 F | RESPIRATION RATE: 12 BRPM | WEIGHT: 201 LBS | BODY MASS INDEX: 39.26 KG/M2 | SYSTOLIC BLOOD PRESSURE: 130 MMHG | HEART RATE: 84 BPM

## 2018-05-21 DIAGNOSIS — R05.9 COUGH: ICD-10-CM

## 2018-05-21 DIAGNOSIS — J06.9 UPPER RESPIRATORY TRACT INFECTION, UNSPECIFIED TYPE: Primary | ICD-10-CM

## 2018-05-21 PROCEDURE — 99213 OFFICE O/P EST LOW 20 MIN: CPT | Performed by: NURSE PRACTITIONER

## 2018-05-21 RX ORDER — FLUTICASONE PROPIONATE 50 MCG
1 SPRAY, SUSPENSION (ML) NASAL DAILY
Qty: 16 G | Refills: 0 | Status: SHIPPED | OUTPATIENT
Start: 2018-05-21 | End: 2018-10-25

## 2018-05-21 RX ORDER — BENZONATATE 200 MG/1
200 CAPSULE ORAL 3 TIMES DAILY PRN
Qty: 20 CAPSULE | Refills: 0 | Status: SHIPPED | OUTPATIENT
Start: 2018-05-21 | End: 2018-10-25

## 2018-05-21 NOTE — LETTER
May 21, 2018     Patient: Gardenia Cockayne   YOB: 1960   Date of Visit: 5/21/2018       To Whom it May Concern: Jimmythais Pinzon is under my professional care  She was seen in my office on 5/21/2018  She may return to work on 5/22/18  If you have any questions or concerns, please don't hesitate to call           Sincerely,          SISSY Nelson        CC: No Recipients

## 2018-05-21 NOTE — PATIENT INSTRUCTIONS
Fluids  Rest  Nasal saline  Supportive care for symptom management  Flonase as needed  Tessalon perles as needed for cough  Antibiotics are not indicated at this time  Symptoms may persist for 7-10 days

## 2018-05-21 NOTE — PROGRESS NOTES
Assessment/Plan:    1  Upper respiratory tract infection, unspecified type  Fluids  Rest  Nasal saline  Supportive care for symptom management  Antibiotics are not indicated at this time  Symptoms may persist for 7-10 days  - fluticasone (FLONASE) 50 mcg/act nasal spray; 1 spray into each nostril daily  Dispense: 16 g; Refill: 0    2  Cough  - benzonatate (TESSALON) 200 MG capsule; Take 1 capsule (200 mg total) by mouth 3 (three) times a day as needed for cough  Dispense: 20 capsule; Refill: 0        Subjective:      Patient ID: Yo Beaulieu is a 65 y o  female who presents with cold symptoms    Runny nose  Post nasal drip  Cough  Sore throat  No fever  Took claritin and tylenol  Took otc cough meds  Started 3 days ago  The following portions of the patient's history were reviewed and updated as appropriate: allergies, current medications, past family history, past medical history, past social history, past surgical history and problem list     Review of Systems   Constitutional: Negative for fatigue and fever  HENT: Positive for congestion, postnasal drip, rhinorrhea and sore throat (mild)  Negative for sinus pain and sinus pressure  Eyes: Negative for discharge  Respiratory: Positive for cough  Negative for shortness of breath  Gastrointestinal: Negative for diarrhea, nausea and vomiting  Musculoskeletal: Negative for myalgias  Skin: Negative for rash  Neurological: Negative for dizziness and headaches  Hematological: Negative for adenopathy  Objective:      /90 (BP Location: Left arm, Patient Position: Sitting, Cuff Size: Adult)   Pulse 84   Temp 98 1 °F (36 7 °C) (Temporal)   Resp 12   Wt 91 2 kg (201 lb)   BMI 39 26 kg/m²          Physical Exam   Constitutional: She is oriented to person, place, and time  She appears well-developed and well-nourished  No distress  HENT:   TMS WNL  Turbinates inflamed  Oropharynx with no erythema or exudate     No sinus tenderness to palpation    (+) PND     Cardiovascular: Normal rate, regular rhythm and normal heart sounds  Pulmonary/Chest: Effort normal and breath sounds normal  No respiratory distress  Lymphadenopathy:     She has no cervical adenopathy  Neurological: She is alert and oriented to person, place, and time  Skin: Skin is warm and dry  Psychiatric: She has a normal mood and affect

## 2018-10-25 ENCOUNTER — HOSPITAL ENCOUNTER (OUTPATIENT)
Dept: RADIOLOGY | Facility: HOSPITAL | Age: 58
Discharge: HOME | End: 2018-10-25
Attending: FAMILY MEDICINE
Payer: COMMERCIAL

## 2018-10-25 ENCOUNTER — CLINICAL SUPPORT (OUTPATIENT)
Dept: FAMILY MEDICINE CLINIC | Facility: CLINIC | Age: 58
End: 2018-10-25
Payer: COMMERCIAL

## 2018-10-25 ENCOUNTER — OFFICE VISIT (OUTPATIENT)
Dept: FAMILY MEDICINE CLINIC | Facility: CLINIC | Age: 58
End: 2018-10-25
Payer: COMMERCIAL

## 2018-10-25 VITALS
HEART RATE: 86 BPM | DIASTOLIC BLOOD PRESSURE: 98 MMHG | TEMPERATURE: 97.6 F | BODY MASS INDEX: 31.74 KG/M2 | HEIGHT: 67 IN | WEIGHT: 202.2 LBS | SYSTOLIC BLOOD PRESSURE: 130 MMHG | RESPIRATION RATE: 16 BRPM

## 2018-10-25 DIAGNOSIS — Z12.31 SCREENING MAMMOGRAM, ENCOUNTER FOR: ICD-10-CM

## 2018-10-25 DIAGNOSIS — R10.2 FEMALE PELVIC PAIN: ICD-10-CM

## 2018-10-25 DIAGNOSIS — D25.9 UTERINE LEIOMYOMA, UNSPECIFIED LOCATION: Primary | ICD-10-CM

## 2018-10-25 DIAGNOSIS — D25.9 UTERINE LEIOMYOMA, UNSPECIFIED LOCATION: ICD-10-CM

## 2018-10-25 DIAGNOSIS — B34.9 VIRAL SYNDROME: ICD-10-CM

## 2018-10-25 DIAGNOSIS — Z23 NEED FOR INFLUENZA VACCINATION: Primary | ICD-10-CM

## 2018-10-25 PROCEDURE — 99214 OFFICE O/P EST MOD 30 MIN: CPT | Performed by: FAMILY MEDICINE

## 2018-10-25 PROCEDURE — 77067 SCR MAMMO BI INCL CAD: CPT

## 2018-10-25 PROCEDURE — 90471 IMMUNIZATION ADMIN: CPT

## 2018-10-25 PROCEDURE — 76830 TRANSVAGINAL US NON-OB: CPT

## 2018-10-25 PROCEDURE — 76856 US EXAM PELVIC COMPLETE: CPT

## 2018-10-25 PROCEDURE — 90686 IIV4 VACC NO PRSV 0.5 ML IM: CPT

## 2018-10-25 RX ORDER — IBUPROFEN 600 MG/1
600 TABLET ORAL EVERY 6 HOURS PRN
Qty: 30 TABLET | Refills: 0 | Status: SHIPPED | OUTPATIENT
Start: 2018-10-25 | End: 2019-08-04 | Stop reason: ALTCHOICE

## 2018-10-25 RX ORDER — KETOROLAC TROMETHAMINE 30 MG/ML
30 INJECTION, SOLUTION INTRAMUSCULAR; INTRAVENOUS ONCE
Status: CANCELLED | OUTPATIENT
Start: 2018-10-25 | End: 2018-10-30

## 2018-10-25 NOTE — LETTER
October 25, 2018     Patient: Steph Harding   YOB: 1960   Date of Visit: 10/25/2018       To Whom it May Concern: Karin Conde is under my professional care  She was seen in my office on 10/25/2018  She may return to work on 10/29/18  If you have any questions or concerns, please don't hesitate to call           Sincerely,          Ayaz Pike MD        CC: No Recipients

## 2018-10-25 NOTE — PATIENT INSTRUCTIONS
Ovarian Cyst   AMBULATORY CARE:   An ovarian cyst  is a sac that grows on an ovary  This sac usually contains fluid, but may sometimes have blood or tissue in it  Most ovarian cysts are harmless and go away without treatment in a few months  Some cysts can grow large, cause pain, or break open  Signs and symptoms of an ovarian cyst:  You may not feel anything or know that you have a cyst, or you may have any of the following:  · Severe pain in your lower abdomen and pelvic area that may be sharp and sudden or feel like a dull ache    · Fullness and swelling in your lower abdomen     · Infertility (not able to get pregnant)    · Late or painful periods    · Small amounts of bleeding between your periods    · Lower abdominal or pelvic pain during sex    · Nausea or vomiting  Call 911 for any of the following:   · You are too weak or dizzy to stand up  Seek care immediately if:   · You have severe abdominal pain  The pain may be sharp and sudden  · You have a fever  Contact your healthcare provider if:   · Your periods are early, late, or more painful than usual     · You have bleeding from your vagina that is not your period  · You have abdominal pain all the time  · Your abdomen is swollen  · You have feelings of fullness, pressure, or discomfort in your abdomen  · You have trouble urinating or emptying your bladder completely  · You have pain during intercourse  · You are losing weight without trying  · You have questions or concerns about your condition or care  Treatment  will depend on your age, test results, and the kind of cyst you have  Your healthcare provider may wait to see if your ovarian cyst will go away without treatment  You may be given hormone medicine, such as birth control pills  This medicine may help to control your periods, shrink a cyst, and prevent new cysts from forming  You may need surgery to have the cyst removed    Apply heat to decrease pain and cramping: Sit in a warm bath, or place a heating pad (turned on low) or a hot water bottle on your abdomen  Do this for 15 to 20 minutes every hour for as many days as directed  Follow up with your healthcare provider as directed:  Write down your questions so you remember to ask them during your visits  © 2017 [employer] Information is for End User's use only and may not be sold, redistributed or otherwise used for commercial purposes  All illustrations and images included in CareNotes® are the copyrighted property of [employer] , [employer]  or [employer]  The above information is an  only  It is not intended as medical advice for individual conditions or treatments  Talk to your doctor, nurse or pharmacist before following any medical regimen to see if it is safe and effective for you

## 2018-10-25 NOTE — PROGRESS NOTES
Adonis Noble 1960 female MRN: [id number]    FAMILY PRACTICE ACUTE OFFICE VISIT  [facility] - [address]      ASSESSMENT/PLAN  Adonis Noble is a 63 y o  female presents to the office for    1  Uterine leiomyoma, unspecified location  -if the ultrasound demonstrates that her leiomyoma has been worsening then I would recommend the patient be evaluated by gyn for possible hysterectomy  - US pelvis complete non OB; Future  - Ambulatory referral to Obstetrics / Gynecology; Future  - ibuprofen (MOTRIN) 600 mg tablet; Take 1 tablet (600 mg total) by mouth every 6 (six) hours as needed for mild pain  Dispense: 30 tablet; Refill: 0    2  Female pelvic pain  - bilateral lower abdomen pain  Toradol 30 mg IM given today  Ibuprofen 600 mg to be used as needed  Denies any history of GI disturbance  -will obtain an ultrasound to rule out ovarian cyst or worsening Leoiomyoma  - Ambulatory referral to Obstetrics / Gynecology; Future  - ibuprofen (MOTRIN) 600 mg tablet; Take 1 tablet (600 mg total) by mouth every 6 (six) hours as needed for mild pain  Dispense: 30 tablet; Refill: 0    3  Viral syndrome  - Likely viral in nature  Encourage the patient to continue supportive care  For fevers > 100 4 please use Tylenol/ Ibuprofen  If your symptoms worsen please contact our office for a sooner appoinment      4  Screening mammogram, encounter for  - Mammo screening bilateral w cad; Future     Disposition:   Return to the office in 2 weeks to see her PCP if symptoms do not improve  If the symptoms worsen the patient is to report to the emergency room for further evaluation  No future appointments  SUBJECTIVE  CC: Pelvic Pain      HPI:  Adonis Noble is a 63 y o  female who presents for an acute appointment for 1 day of 10/10 bilateral ovarian pain  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  She gets her Pap smears by her PCP regularly  Patient states that she has just gone into menopause and has had almost 12 months of no breakthrough bleeding  Denies any constipation given that she took a laxative 2 days ago  Patient has a strong history of of her mother having cancer       -1 day of sore throat with a productive cough  No fevers  Denies any sick contacts   -would like her screening mammogram script  Review of Systems   Constitutional: Negative for activity change, appetite change, chills, fatigue and fever  HENT: Positive for sore throat  Negative for congestion  Eyes: Negative for visual disturbance  Respiratory: Positive for cough  Negative for chest tightness and shortness of breath  Cardiovascular: Negative for chest pain and leg swelling  Gastrointestinal: Negative for abdominal distention, abdominal pain, constipation, diarrhea, nausea and vomiting  Genitourinary: Positive for pelvic pain  Allergic/Immunologic: Negative for environmental allergies  Neurological: Negative for dizziness, light-headedness and headaches  All other systems reviewed and are negative  Historical Information   The patient history was reviewed as follows:  History reviewed  No pertinent past medical history        Past Surgical History:   Procedure Laterality Date    HERNIA REPAIR  2011     Family History   Problem Relation Age of Onset    Cancer Mother     Alzheimer's disease Father       Social History   History   Alcohol Use    Yes     Comment: social     History   Drug Use No     History   Smoking Status    Never Smoker   Smokeless Tobacco    Never Used       Medications:     Current Outpatient Prescriptions:     Ascorbic Acid (CVS VITAMIN C) 500 MG CHEW, Chew 500 mg daily  , Disp: , Rfl:     Multiple Vitamin (DAILY VITAMINS PO), Take by mouth daily  , Disp: , Rfl:     ibuprofen (MOTRIN) 600 mg tablet, Take 1 tablet (600 mg total) by mouth every 6 (six) hours as needed for mild pain, Disp: 30 tablet, Rfl: 0    No Known Allergies    OBJECTIVE  Vitals:   Vitals:    10/25/18 0927   BP: 130/98   BP Location: Left arm   Patient Position: Sitting   Cuff Size: Standard   Pulse: 86   Resp: 16   Temp: 97 6 °F (36 4 °C)   Weight: 91 7 kg (202 lb 3 2 oz)   Height: 5' 7" (1 702 m)         Physical Exam   Constitutional: She is oriented to person, place, and time  Vital signs are normal  She appears well-developed and well-nourished  HENT:   Head: Normocephalic and atraumatic  Right Ear: Hearing normal    Left Ear: Hearing normal    Mouth/Throat: Posterior oropharyngeal erythema present  Eyes: Pupils are equal, round, and reactive to light  Conjunctivae and EOM are normal    Neck: Normal range of motion  Neck supple  Cardiovascular: Normal rate, regular rhythm, S1 normal, S2 normal, normal heart sounds and intact distal pulses  No murmur heard  Pulmonary/Chest: Effort normal and breath sounds normal  No respiratory distress  She has no wheezes  Abdominal: Soft  Bowel sounds are normal  She exhibits no distension  There is tenderness in the right lower quadrant and left lower quadrant  Musculoskeletal: Normal range of motion  She exhibits no edema  Neurological: She is alert and oriented to person, place, and time  She has normal strength  Skin: Skin is warm  No rash noted  Psychiatric: She has a normal mood and affect  Her speech is normal and behavior is normal  Judgment and thought content normal    Vitals reviewed             Carmen Redmond MD,   [facility]  10/25/2018

## 2018-10-26 DIAGNOSIS — R10.9 ABDOMINAL PAIN, UNSPECIFIED ABDOMINAL LOCATION: Primary | ICD-10-CM

## 2018-11-11 ENCOUNTER — HOSPITAL ENCOUNTER (EMERGENCY)
Facility: HOSPITAL | Age: 58
Discharge: HOME | End: 2018-11-11
Attending: EMERGENCY MEDICINE | Admitting: EMERGENCY MEDICINE
Payer: COMMERCIAL

## 2018-11-11 ENCOUNTER — APPOINTMENT (EMERGENCY)
Dept: RADIOLOGY | Facility: HOSPITAL | Age: 58
End: 2018-11-11
Payer: COMMERCIAL

## 2018-11-11 VITALS
HEART RATE: 91 BPM | RESPIRATION RATE: 18 BRPM | OXYGEN SATURATION: 97 % | BODY MASS INDEX: 31.32 KG/M2 | WEIGHT: 200 LBS | TEMPERATURE: 98.2 F | SYSTOLIC BLOOD PRESSURE: 151 MMHG | DIASTOLIC BLOOD PRESSURE: 71 MMHG

## 2018-11-11 DIAGNOSIS — S62.109A WRIST FRACTURE: Primary | ICD-10-CM

## 2018-11-11 DIAGNOSIS — S62.009A SCAPHOID FRACTURE: ICD-10-CM

## 2018-11-11 PROCEDURE — 99245 OFF/OP CONSLTJ NEW/EST HI 55: CPT | Performed by: ORTHOPAEDIC SURGERY

## 2018-11-11 PROCEDURE — 73200 CT UPPER EXTREMITY W/O DYE: CPT

## 2018-11-11 PROCEDURE — 73090 X-RAY EXAM OF FOREARM: CPT

## 2018-11-11 PROCEDURE — 96374 THER/PROPH/DIAG INJ IV PUSH: CPT

## 2018-11-11 PROCEDURE — 73110 X-RAY EXAM OF WRIST: CPT

## 2018-11-11 PROCEDURE — 96376 TX/PRO/DX INJ SAME DRUG ADON: CPT

## 2018-11-11 PROCEDURE — 25605 CLTX DST RDL FX/EPHYS SEP W/: CPT | Performed by: ORTHOPAEDIC SURGERY

## 2018-11-11 PROCEDURE — 73130 X-RAY EXAM OF HAND: CPT

## 2018-11-11 PROCEDURE — 73100 X-RAY EXAM OF WRIST: CPT

## 2018-11-11 PROCEDURE — 99284 EMERGENCY DEPT VISIT MOD MDM: CPT

## 2018-11-11 RX ORDER — LIDOCAINE HYDROCHLORIDE 20 MG/ML
5 INJECTION, SOLUTION EPIDURAL; INFILTRATION; INTRACAUDAL; PERINEURAL ONCE
Status: DISCONTINUED | OUTPATIENT
Start: 2018-11-11 | End: 2018-11-11 | Stop reason: HOSPADM

## 2018-11-11 RX ORDER — OXYCODONE HYDROCHLORIDE AND ACETAMINOPHEN 5; 325 MG/1; MG/1
1 TABLET ORAL EVERY 8 HOURS PRN
Qty: 6 TABLET | Refills: 0 | Status: SHIPPED | OUTPATIENT
Start: 2018-11-11 | End: 2018-11-14

## 2018-11-11 RX ORDER — LIDOCAINE HYDROCHLORIDE 20 MG/ML
10 INJECTION, SOLUTION EPIDURAL; INFILTRATION; INTRACAUDAL; PERINEURAL ONCE
Status: COMPLETED | OUTPATIENT
Start: 2018-11-11 | End: 2018-11-11

## 2018-11-11 RX ORDER — OXYCODONE HYDROCHLORIDE AND ACETAMINOPHEN 5; 325 MG/1; MG/1
1 TABLET ORAL ONCE
Status: COMPLETED | OUTPATIENT
Start: 2018-11-11 | End: 2018-11-11

## 2018-11-11 RX ORDER — FENTANYL CITRATE 50 UG/ML
50 INJECTION, SOLUTION INTRAMUSCULAR; INTRAVENOUS ONCE
Status: COMPLETED | OUTPATIENT
Start: 2018-11-11 | End: 2018-11-11

## 2018-11-11 RX ADMIN — FENTANYL CITRATE 50 MCG: 50 INJECTION, SOLUTION INTRAMUSCULAR; INTRAVENOUS at 17:05

## 2018-11-11 RX ADMIN — LIDOCAINE HYDROCHLORIDE 10 ML: 20 INJECTION, SOLUTION EPIDURAL; INFILTRATION; INTRACAUDAL; PERINEURAL at 17:09

## 2018-11-11 RX ADMIN — OXYCODONE HYDROCHLORIDE AND ACETAMINOPHEN 1 TABLET: 5; 325 TABLET ORAL at 18:47

## 2018-11-11 RX ADMIN — FENTANYL CITRATE 50 MCG: 50 INJECTION, SOLUTION INTRAMUSCULAR; INTRAVENOUS at 15:21

## 2018-11-11 NOTE — DISCHARGE INSTRUCTIONS
Arm Fracture in Adults   WHAT YOU NEED TO KNOW:   An arm fracture is a crack or break in one or more of the bones in your arm  An arm fracture may be caused by a fall onto an outstretched hand  It may also be caused by trauma from a car accident or a sports injury  Osteoporosis (brittle bones) can increase your risk for a fracture  DISCHARGE INSTRUCTIONS:   Return to the emergency department if:   · The pain in your injured arm does not get better or gets worse, even after you rest and take medicine  · Your injured arm, hand, or fingers feel numb  · Your arm is swollen, red, and feels warm  · Your skin over the arm fracture is swollen, cold, or pale  · You cannot move your arm, hand, or fingers  Contact your healthcare provider if:   · You have a fever  · Your brace or splint becomes wet, damaged, or comes off  · You have questions or concerns about your injury, treatment, or care  Medicines:   · NSAIDs , such as ibuprofen, help decrease swelling and pain  This medicine is available with or without a doctor's order  NSAIDs can cause stomach bleeding or kidney problems in certain people  If you take blood thinner medicine, always ask your healthcare provider if NSAIDs are safe for you  Always read the medicine label and follow directions  · Acetaminophen  decreases pain  It is available without a doctor's order  Ask how much to take and how often to take it  Follow directions  Acetaminophen can cause liver damage if not taken correctly  · Prescription pain medicine  may be given  Ask how to take this medicine safely  · Take your medicine as directed  Contact your healthcare provider if you think your medicine is not helping or if you have side effects  Tell him or her if you are allergic to any medicine  Keep a list of the medicines, vitamins, and herbs you take  Include the amounts, and when and why you take them  Bring the list or the pill bottles to follow-up visits   Carry your medicine list with you in case of an emergency  Follow up with your healthcare provider within 1 week: You may need to see a bone specialist within 3 to 4 days if you need surgery or further treatment for your arm fracture  Write down your questions so you remember to ask them during your visits  Rest:  You should rest your arm as much as possible  Ask your healthcare provider when you can put pressure or weight on your arm  Also ask when you can return to sports or vigorous exercises  Ice:  Apply ice on your arm for 15 to 20 minutes every hour or as directed  Use an ice pack, or put crushed ice in a plastic bag  Cover it with a towel  Ice helps prevent tissue damage and decreases swelling and pain  Elevate:  Elevate your arm above the level of your heart as often as you can  This will help decrease swelling and pain  Prop your arm on pillows or blankets to keep it elevated comfortably  Care for your cast or splint:  Ask your healthcare provider when it is okay to bathe  Do not get your cast or splint wet  Before you take a bath or shower, cover your cast or splint with a plastic bag  Tape the bag to your skin to help keep water out  Hold your arm away from the water in case the bag leaks  · Check the skin around your cast or splint each day for any redness or open skin  · Do not use a sharp or pointed object to scratch your skin under the cast or splint  Physical therapy:  A physical therapist teaches you exercises to help improve movement and strength, and to decrease pain  © 2017 [facility] Information is for End User's use only and may not be sold, redistributed or otherwise used for commercial purposes  All illustrations and images included in CareNotes® are the copyrighted property of [employer]  or Donavan Ross  The above information is an  only  It is not intended as medical advice for individual conditions or treatments   Talk to your doctor, nurse or pharmacist before following any medical regimen to see if it is safe and effective for you  Wrist Fracture in Adults   WHAT YOU NEED TO KNOW:   A wrist fracture is a break in one or more of the bones in your wrist    DISCHARGE INSTRUCTIONS:   Return to the emergency department if:   · Your pain gets worse or does not get better after you take pain medicine  · Your cast or splint breaks, gets wet, or is damaged  · Your hand or fingers feel numb or cold  · Your hand or fingers turn white or blue  · Your splint or cast feels too tight  · You have more pain or swelling after the cast or splint is put on  Contact your healthcare provider if:   · You have a fever  · There is a foul smell or blood coming from under the cast     · You have questions or concerns about your condition or care  Medicines:   · Prescription pain medicine  may be given  Ask your healthcare provider how to take this medicine safely  Some prescription pain medicines contain acetaminophen  Do not take other medicines that contain acetaminophen without talking to your healthcare provider  Too much acetaminophen may cause liver damage  Prescription pain medicine may cause constipation  Ask your healthcare provider how to prevent or treat constipation  · NSAIDs , such as ibuprofen, help decrease swelling, pain, and fever  NSAIDs can cause stomach bleeding or kidney problems in certain people  If you take blood thinner medicine, always ask your healthcare provider if NSAIDs are safe for you  Always read the medicine label and follow directions  · Acetaminophen  decreases pain and fever  It is available without a doctor's order  Ask how much to take and how often to take it  Follow directions  Read the labels of all other medicines you are using to see if they also contain acetaminophen, or ask your doctor or pharmacist  Acetaminophen can cause liver damage if not taken correctly   Do not use more than 4 grams (4,000 milligrams) total of acetaminophen in one day  · Take your medicine as directed  Contact your healthcare provider if you think your medicine is not helping or if you have side effects  Tell him or her if you are allergic to any medicine  Keep a list of the medicines, vitamins, and herbs you take  Include the amounts, and when and why you take them  Bring the list or the pill bottles to follow-up visits  Carry your medicine list with you in case of an emergency  Self-care:   · Rest  as much as possible  Do not play contact sports until the healthcare provider says it is okay  · Apply ice  on your wrist for 15 to 20 minutes every hour or as directed  Use an ice pack, or put crushed ice in a plastic bag  Cover it with a towel before you place it on your skin  Ice helps prevent tissue damage and decreases swelling and pain  · Elevate  your wrist above the level of your heart as often as possible  This will help decrease swelling and pain  Prop your wrist on pillows or blankets to keep it elevated comfortably  Cast or splint care:   · You may take a bath or shower as directed  Do not let your cast or splint get wet  Before bathing, cover the cast or splint with 2 plastic trash bags  Tape the bags to your skin above the cast or splint to seal out the water  Keep your arm out of the water in case the bag breaks  If a plaster cast gets wet and soft, call your healthcare provider  · Check the skin around the cast or splint every day  You may put lotion on any red or sore areas  · Do not push down or lean on the cast or brace because it may break  · Do not  scratch the skin under the cast by putting a sharp or pointed object inside the cast   Go to physical therapy as directed: You may need physical therapy after your wrist heals and the cast is removed  A physical therapist can teach you exercises to help improve movement and strength and to decrease pain     Follow up with your healthcare provider or bone specialist as directed: You may need to return to have your cast removed  You may also need an x-ray to check how well the bone has healed  Write down your questions so you remember to ask them during your visits  © 2017 [address] Information is for End User's use only and may not be sold, redistributed or otherwise used for commercial purposes  All illustrations and images included in CareNotes® are the copyrighted property of [employer]  or Donavan Ross  The above information is an  only  It is not intended as medical advice for individual conditions or treatments  Talk to your doctor, nurse or pharmacist before following any medical regimen to see if it is safe and effective for you

## 2018-11-11 NOTE — ED PROVIDER NOTES
History  Chief Complaint   Patient presents with    Wrist Injury - Major     pt fell r wrist injury post fall in the driveway     [id number] female presents with right wrist pain x 30 minutes  She tripped over her feet in the driveway when she fell on her outstretched hands, majority of weight landing on R wrist  She has a lot of wrist pain  She is still able to move her fingers  There is obvious deformity, swelling and an abrasion over her wrist  She admits to hand tingling  She denies any numbness, chest pain, shortness of breath, loss of consciousness before or after fall  She denies elbow pain, shoulder pain  She did not hit her head  Prior to Admission Medications   Prescriptions Last Dose Informant Patient Reported? Taking? Ascorbic Acid (CVS VITAMIN C) 500 MG CHEW  Self Yes No   Sig: Chew 500 mg daily     Multiple Vitamin (DAILY VITAMINS PO)  Self Yes No   Sig: Take by mouth daily     ibuprofen (MOTRIN) 600 mg tablet   No No   Sig: Take 1 tablet (600 mg total) by mouth every 6 (six) hours as needed for mild pain      Facility-Administered Medications: None       History reviewed  No pertinent past medical history  Past Surgical History:   Procedure Laterality Date    HERNIA REPAIR  2011       Family History   Problem Relation Age of Onset    Cancer Mother     Alzheimer's disease Father      I have reviewed and agree with the history as documented  Social History   Substance Use Topics    Smoking status: Never Smoker    Smokeless tobacco: Never Used    Alcohol use Yes      Comment: social        Review of Systems   Constitutional: Negative for chills and fever  HENT: Negative for sneezing and sore throat  Respiratory: Negative for cough and shortness of breath  Cardiovascular: Negative for chest pain, palpitations and leg swelling  Gastrointestinal: Negative for abdominal pain, constipation, diarrhea, nausea and vomiting     Musculoskeletal: Positive for joint swelling and myalgias  Negative for back pain and gait problem  Skin: Positive for wound  Negative for color change, pallor and rash  Neurological: Negative for dizziness, weakness, light-headedness, numbness and headaches  Paresthesias   Psychiatric/Behavioral: Negative for agitation  All other systems reviewed and are negative  Physical Exam  Physical Exam   Constitutional: She appears well-developed and well-nourished  She appears distressed  HENT:   Head: Normocephalic and atraumatic  Nose: Nose normal    Eyes: EOM are normal    Neck: Normal range of motion  Cardiovascular: Normal rate, regular rhythm, normal heart sounds and intact distal pulses  Exam reveals no gallop and no friction rub  No murmur heard  Pulmonary/Chest: Effort normal and breath sounds normal  No respiratory distress  She has no wheezes  She has no rales  Sp02 is 98% indicating adequate oxygenation on room air   Musculoskeletal: She exhibits edema and tenderness  Arms:  Skin: Capillary refill takes less than 2 seconds  She is not diaphoretic  Nursing note and vitals reviewed        Vital Signs  ED Triage Vitals   Temperature Pulse Respirations Blood Pressure SpO2   11/11/18 1510 11/11/18 1510 11/11/18 1510 11/11/18 1510 11/11/18 1510   98 2 °F (36 8 °C) 84 18 140/74 98 %      Temp Source Heart Rate Source Patient Position - Orthostatic VS BP Location FiO2 (%)   11/11/18 1510 11/11/18 1510 11/11/18 1510 11/11/18 1510 --   Tympanic Monitor Lying Left arm       Pain Score       11/11/18 1705       7           Vitals:    11/11/18 1510 11/11/18 1846   BP: 140/74 151/71   Pulse: 84 91   Patient Position - Orthostatic VS: Lying Lying       Visual Acuity      ED Medications  Medications   lidocaine (PF) (XYLOCAINE-MPF) 2 % injection 5 mL (5 mL Infiltration Not Given 11/11/18 1913)   fentanyl citrate (PF) 100 MCG/2ML 50 mcg (50 mcg Intravenous Given 11/11/18 1521)   lidocaine (PF) (XYLOCAINE-MPF) 2 % injection 10 mL (10 mL Infiltration Given 11/11/18 1709)   fentanyl citrate (PF) 100 MCG/2ML 50 mcg (50 mcg Intravenous Given 11/11/18 1705)   oxyCODONE-acetaminophen (PERCOCET) 5-325 mg per tablet 1 tablet (1 tablet Oral Given 11/11/18 1847)       Diagnostic Studies  Results Reviewed     None                 CT wrist right wo contrast   Final Result by Hilario Worthington MD (11/11 1858)      Satisfactory alignment of comminuted distal radial fracture with intra-articular extension and obliquely oriented distal ulnar fracture  Tiny chip fracture from the triquetrum as well  Workstation performed: [id number]         XR wrist 3+ views RIGHT    (Results Pending)   XR hand 3+ views RIGHT    (Results Pending)   XR forearm 2 views RIGHT    (Results Pending)   XR wrist 2 vw right    (Results Pending)              Procedures  Procedures       Phone Contacts  ED Phone Contact    ED Course  ED Course as of Nov 11 2047   Sun Nov 11, 2018   1543 Spoke to Dr Cori Castillo, will be coming in to reduce and cast patient's wrist     1826 Patient to be following up with Dr Kulwinder Oh office tomorrow, hand surgery on Weds  Patient would like to take pain medication prior to discharge  Will give 1 percocet and d/c with prescription for the next 2 days  Ready for discharge  MDM  Number of Diagnoses or Management Options  Scaphoid fracture:   Wrist fracture:   Diagnosis management comments: Discussed case with Dr Cori Castillo who saw patient at bedside, reduced dislocation and gave cast  Patient has follow up information with orthopedic surgery, given contact nformation will be seen in office tomorrow and will be going in for surgery this week  Pain controlled in ED  Return to ED for worsening pain, numbness, tingling  Gave patient proper education regarding diagnosis  Answered all questions  Return to ED for any worsening of symptoms otherwise follow up with primary care physician for re-evaluation   Discussed plan with patient who verbalized understanding and agreed to plan  Amount and/or Complexity of Data Reviewed  Tests in the radiology section of CPT®: ordered and reviewed  Discussion of test results with the performing providers: yes  Discuss the patient with other providers: yes (Discussed case with Dr Cole Rider and Dr Ira Fisher)  Independent visualization of images, tracings, or specimens: yes      CritCare Time    Disposition  Final diagnoses:   Wrist fracture   Scaphoid fracture     Time reflects when diagnosis was documented in both MDM as applicable and the Disposition within this note     Time User Action Codes Description Comment    11/11/2018  6:23 PM Jose David Laboy Add [S62 109A] Wrist fracture     11/11/2018  8:46 PM Jruado, [address] Scaphoid fracture       ED Disposition     ED Disposition Condition Comment    Discharge  Alice Simon discharge to home/self care      Condition at discharge: Good        Follow-up Information     Follow up With Specialties Details Why Contact Info Additional [id number], DO Orthopedic Surgery Call in 1 day to schedule appointment for hand surgery [address] Emergency Department Emergency Medicine Go to As needed 7 [facility] [address] [facility] ED, [facility], [id number]          Discharge Medication List as of 11/11/2018  6:26 PM      START taking these medications    Details   oxyCODONE-acetaminophen (PERCOCET) 5-325 mg per tablet Take 1 tablet by mouth every 8 (eight) hours as needed for moderate pain for up to 2 days Max Daily Amount: 3 tablets, Starting Sun 11/11/2018, Until Tue 11/13/2018, Print         CONTINUE these medications which have NOT CHANGED    Details   Ascorbic Acid (CVS VITAMIN C) 500 MG CHEW Chew 500 mg daily  , Historical Med      ibuprofen (MOTRIN) 600 mg tablet Take 1 tablet (600 mg total) by mouth every 6 (six) hours as needed for mild pain, Starting Thu 10/25/2018, Normal      Multiple Vitamin (DAILY VITAMINS PO) Take by mouth daily  , Historical Med           No discharge procedures on file      ED Provider  Electronically Signed by           Denise Cote PA-C  11/11/18 2047

## 2018-11-11 NOTE — CONSULTS
Sid Agent 63 y o  female MRN: [id number]  Unit/Bed#: [facility] XRAY      Chief Complaint:   right wrist pain    HPI:   63 y o  right hand dominant female status post  fall complaining of right wrist pain  Pain is well localized, made worse with motion or contact to the area, and relieved by rest   Patient Denies motor or sensory deficits to the affected hand  Patient was washing her car in her driveway and slipped and fell over the hose  Noted deformity to R wrist   Denies any numbness or tingling now or at the time of injury  Denies any bleeding wounds but does claims to have an abrasion over top of the R hand  Denies any cold sensation in the fingers of the R hand  Patient has received fentanyl in the ER which helped    Review Of Systems:   · Skin: Normal  · Neuro: See HPI  · Musculoskeletal: See HPI  · 14 point review of systems negative except as stated above     Past Medical History:   History reviewed  No pertinent past medical history      Past Surgical History:   Past Surgical History:   Procedure Laterality Date    HERNIA REPAIR  2011       Family History:  Family history reviewed and non-contributory  Family History   Problem Relation Age of Onset    Cancer Mother     Alzheimer's disease Father        Social History:  Social History     Social History    Marital status: /Civil Union     Spouse name: N/A    Number of children: N/A    Years of education: N/A     Social History Main Topics    Smoking status: Never Smoker    Smokeless tobacco: Never Used    Alcohol use Yes      Comment: social    Drug use: No    Sexual activity: Not Asked     Other Topics Concern    None     Social History Narrative    None       Allergies:   No Known Allergies        Labs:    0  Lab Value Date/Time   HCT 40 4 11/02/2017 0846   HGB 13 6 11/02/2017 0846   WBC 5 3 11/02/2017 0846       Meds:    Current Facility-Administered Medications:     lidocaine (PF) (XYLOCAINE-MPF) 2 % injection 5 mL, 5 mL, Infiltration, Once, Nikki Cade MD    Current Outpatient Prescriptions:     Ascorbic Acid (CVS VITAMIN C) 500 MG CHEW, Chew 500 mg daily  , Disp: , Rfl:     ibuprofen (MOTRIN) 600 mg tablet, Take 1 tablet (600 mg total) by mouth every 6 (six) hours as needed for mild pain, Disp: 30 tablet, Rfl: 0    Multiple Vitamin (DAILY VITAMINS PO), Take by mouth daily  , Disp: , Rfl:     Blood Culture:   No results found for: BLOODCX    Wound Culture:   No results found for: WOUNDCULT    Ins and Outs:  No intake/output data recorded  Physical Exam:   /74 (BP Location: Left arm)   Pulse 84   Temp 98 2 °F (36 8 °C) (Tympanic)   Resp 18   Wt 90 7 kg (200 lb)   SpO2 98%   BMI 31 32 kg/m²   Gen: Alert and oriented to person, place, time  HEENT: EOMI, eyes clear, moist mucus membranes, hearing intact  Respiratory: Bilateral chest rise  No audible wheezing found  Cardiovascular: Regular Rate and Rhythm  Abdomen: soft nontender/nondistended  Musculoskeletal: right upper extremity  · Skin intact  · + deformity, no open wounds  · + edema to R wrist  · Nontender to palpation over forearm  · Sensation intact to median, radial, ulnar, distributions  · Motor intact to ain/pin/m/r/u   · Finger tips warm and well perfused 2+ radial pulse  · NT over elbow  · FROM elbow  · Comp soft  ·     Radiology:   I personally reviewed the films  X-rays right wrist shows comminuted displaced DR fx, distal ulnar shaft fx ND, scaphoid tubercle fx    _*_*_*_*_*_*_*_*_*_*_*_*_*_*_*_*_*_*_*_*_*_*_*_*_*_*_*_*_*_*_*_*_*_*_*_*_*_*_*_*_*    Procedure: Distal radius reduction and splint application    A hematoma block was given with 10cc of %2 lidocaine without epi  Once adequate anesthesia was obtained a gentle closed reduction maneuver was performed and pt was placed in a well padded sugartong splint  Pt tolerated the procedure well and was neurovascularly intact both pre and post procedure   Post reduction orthogonal x rays demonstrate improved reduction of the fracture fragments  Patient felt better after reduction with more motion of fingers  NVI ain/pin/m/u/r/a after reduction  Assessment:  63 y  o female S/P fall with right distal radius fracture    Plan:   · Non weight bearing right upper extremity in sugartong splint  · Discharged from ED with oral pain meds and instruction to f/u tomorrow in office  · Instructed to return to ED if new numbness or tingling in fingers,  Pain that is out of control despite oral pain meds, or if fingers turn pale and cold  · Analgesics for pain   · Ice and elevation  · Will need surgery, fracture not amenable to closed treatment as it is a volar shear type fracture and continues to displace  This was explained to the patient and her spouse        Dillon Jessica DO

## 2018-11-12 ENCOUNTER — APPOINTMENT (OUTPATIENT)
Dept: RADIOLOGY | Facility: CLINIC | Age: 58
End: 2018-11-12
Payer: COMMERCIAL

## 2018-11-12 ENCOUNTER — OFFICE VISIT (OUTPATIENT)
Dept: FAMILY MEDICINE CLINIC | Facility: CLINIC | Age: 58
End: 2018-11-12
Payer: COMMERCIAL

## 2018-11-12 ENCOUNTER — TELEPHONE (OUTPATIENT)
Dept: FAMILY MEDICINE CLINIC | Facility: CLINIC | Age: 58
End: 2018-11-12

## 2018-11-12 ENCOUNTER — TRANSCRIBE ORDERS (OUTPATIENT)
Dept: RADIOLOGY | Facility: CLINIC | Age: 58
End: 2018-11-12

## 2018-11-12 ENCOUNTER — APPOINTMENT (OUTPATIENT)
Dept: LAB | Facility: HOSPITAL | Age: 58
End: 2018-11-12
Attending: FAMILY MEDICINE
Payer: COMMERCIAL

## 2018-11-12 ENCOUNTER — OFFICE VISIT (OUTPATIENT)
Dept: OBGYN CLINIC | Facility: CLINIC | Age: 58
End: 2018-11-12
Payer: COMMERCIAL

## 2018-11-12 VITALS
HEART RATE: 70 BPM | BODY MASS INDEX: 32.18 KG/M2 | WEIGHT: 205 LBS | DIASTOLIC BLOOD PRESSURE: 80 MMHG | RESPIRATION RATE: 12 BRPM | SYSTOLIC BLOOD PRESSURE: 140 MMHG | HEIGHT: 67 IN | TEMPERATURE: 97.5 F

## 2018-11-12 VITALS — HEIGHT: 67 IN | WEIGHT: 200 LBS | BODY MASS INDEX: 31.39 KG/M2

## 2018-11-12 DIAGNOSIS — S52.501A CLOSED FRACTURE OF DISTAL ENDS OF RIGHT RADIUS AND ULNA, INITIAL ENCOUNTER: ICD-10-CM

## 2018-11-12 DIAGNOSIS — S62.001A CLOSED NONDISPLACED FRACTURE OF SCAPHOID OF RIGHT WRIST, UNSPECIFIED PORTION OF SCAPHOID, INITIAL ENCOUNTER: ICD-10-CM

## 2018-11-12 DIAGNOSIS — E87.6 HYPOKALEMIA: ICD-10-CM

## 2018-11-12 DIAGNOSIS — Z01.818 PREOP EXAMINATION: Primary | ICD-10-CM

## 2018-11-12 DIAGNOSIS — Z01.818 PREOP EXAMINATION: ICD-10-CM

## 2018-11-12 DIAGNOSIS — S52.602A TRAUMATIC CLOSED DISPLACED FRACTURE OF DISTAL END OF LEFT RADIUS AND ULNA, INITIAL ENCOUNTER: Primary | ICD-10-CM

## 2018-11-12 DIAGNOSIS — S52.601A CLOSED FRACTURE OF DISTAL ENDS OF RIGHT RADIUS AND ULNA, INITIAL ENCOUNTER: ICD-10-CM

## 2018-11-12 DIAGNOSIS — S52.502A TRAUMATIC CLOSED DISPLACED FRACTURE OF DISTAL END OF LEFT RADIUS AND ULNA, INITIAL ENCOUNTER: Primary | ICD-10-CM

## 2018-11-12 DIAGNOSIS — E87.6 HYPOKALEMIA: Primary | ICD-10-CM

## 2018-11-12 DIAGNOSIS — R94.31 ABNORMAL EKG: ICD-10-CM

## 2018-11-12 PROBLEM — N83.201 RIGHT OVARIAN CYST: Status: ACTIVE | Noted: 2017-10-30

## 2018-11-12 LAB
ANION GAP SERPL CALCULATED.3IONS-SCNC: 9 MMOL/L (ref 4–13)
BILIRUB UR QL STRIP: NEGATIVE
BUN SERPL-MCNC: 14 MG/DL (ref 5–25)
CALCIUM SERPL-MCNC: 8.5 MG/DL (ref 8.3–10.1)
CHLORIDE SERPL-SCNC: 100 MMOL/L (ref 100–108)
CLARITY UR: CLEAR
CO2 SERPL-SCNC: 29 MMOL/L (ref 21–32)
COLOR UR: NORMAL
CREAT SERPL-MCNC: 0.61 MG/DL (ref 0.6–1.3)
ERYTHROCYTE [DISTWIDTH] IN BLOOD BY AUTOMATED COUNT: 11.5 % (ref 11.6–15.1)
GFR SERPL CREATININE-BSD FRML MDRD: 101 ML/MIN/1.73SQ M
GLUCOSE SERPL-MCNC: 91 MG/DL (ref 65–140)
GLUCOSE UR STRIP-MCNC: NEGATIVE MG/DL
HCT VFR BLD AUTO: 39.1 % (ref 34.8–46.1)
HGB BLD-MCNC: 12.6 G/DL (ref 11.5–15.4)
HGB UR QL STRIP.AUTO: NEGATIVE
INR PPP: 0.96 (ref 0.86–1.16)
KETONES UR STRIP-MCNC: NEGATIVE MG/DL
LEUKOCYTE ESTERASE UR QL STRIP: NEGATIVE
MCH RBC QN AUTO: 30.6 PG (ref 26.8–34.3)
MCHC RBC AUTO-ENTMCNC: 32.2 G/DL (ref 31.4–37.4)
MCV RBC AUTO: 95 FL (ref 82–98)
NITRITE UR QL STRIP: NEGATIVE
PH UR STRIP.AUTO: 5.5 [PH] (ref 5–9)
PLATELET # BLD AUTO: 303 THOUSANDS/UL (ref 149–390)
PMV BLD AUTO: 9 FL (ref 8.9–12.7)
POTASSIUM SERPL-SCNC: 3.2 MMOL/L (ref 3.5–5.3)
PROT UR STRIP-MCNC: NEGATIVE MG/DL
PROTHROMBIN TIME: 10.1 SECONDS (ref 9.4–11.7)
RBC # BLD AUTO: 4.12 MILLION/UL (ref 3.81–5.12)
SL AMB  POCT GLUCOSE, UA: ABNORMAL
SL AMB LEUKOCYTE ESTERASE,UA: 500
SL AMB POCT BILIRUBIN,UA: ABNORMAL
SL AMB POCT BLOOD,UA: ABNORMAL
SL AMB POCT CLARITY,UA: ABNORMAL
SL AMB POCT COLOR,UA: YELLOW
SL AMB POCT KETONES,UA: ABNORMAL
SL AMB POCT NITRITE,UA: ABNORMAL
SL AMB POCT PH,UA: 5
SL AMB POCT SPECIFIC GRAVITY,UA: 1.03
SL AMB POCT URINE PROTEIN: ABNORMAL
SL AMB POCT UROBILINOGEN: 1
SODIUM SERPL-SCNC: 138 MMOL/L (ref 136–145)
SP GR UR STRIP.AUTO: <=1.005 (ref 1–1.03)
UROBILINOGEN UR QL STRIP.AUTO: 0.2 E.U./DL
WBC # BLD AUTO: 6.76 THOUSAND/UL (ref 4.31–10.16)

## 2018-11-12 PROCEDURE — 85027 COMPLETE CBC AUTOMATED: CPT

## 2018-11-12 PROCEDURE — 80048 BASIC METABOLIC PNL TOTAL CA: CPT

## 2018-11-12 PROCEDURE — 99243 OFF/OP CNSLTJ NEW/EST LOW 30: CPT | Performed by: FAMILY MEDICINE

## 2018-11-12 PROCEDURE — 81003 URINALYSIS AUTO W/O SCOPE: CPT | Performed by: FAMILY MEDICINE

## 2018-11-12 PROCEDURE — 85610 PROTHROMBIN TIME: CPT

## 2018-11-12 PROCEDURE — 93000 ELECTROCARDIOGRAM COMPLETE: CPT | Performed by: FAMILY MEDICINE

## 2018-11-12 PROCEDURE — 71046 X-RAY EXAM CHEST 2 VIEWS: CPT

## 2018-11-12 PROCEDURE — 36415 COLL VENOUS BLD VENIPUNCTURE: CPT

## 2018-11-12 PROCEDURE — 87086 URINE CULTURE/COLONY COUNT: CPT

## 2018-11-12 PROCEDURE — 99213 OFFICE O/P EST LOW 20 MIN: CPT | Performed by: ORTHOPAEDIC SURGERY

## 2018-11-12 RX ORDER — POTASSIUM CHLORIDE 20 MEQ/1
TABLET, EXTENDED RELEASE ORAL
Qty: 6 TABLET | Refills: 0 | Status: SHIPPED | OUTPATIENT
Start: 2018-11-12 | End: 2019-08-01 | Stop reason: ALTCHOICE

## 2018-11-12 NOTE — TELEPHONE ENCOUNTER
Dr Jacqueline Navarro    Patient thinks she missed a call from you, possibly about her EKG? Please call

## 2018-11-12 NOTE — ED NOTES
Pt resting in bed  Wrist casted and intact  Pt right hand noted with +sensation, mobility, and cap refill <2 sec         Yamilka Rosario RN  11/11/18 6307

## 2018-11-12 NOTE — PROGRESS NOTES
Assessment/Plan:  1  Traumatic closed displaced fracture of distal end of left radius and ulna, initial encounter     2  Closed nondisplaced fracture of scaphoid of right wrist, unspecified portion of scaphoid, initial encounter         Scribe Attestation    I,:   Shalini Reeder MA am acting as a scribe while in the presence of the attending physician :        I,:   Jason Mims DO personally performed the services described in this documentation    as scribed in my presence :              Rajani's CT scan demonstrates a a comminuted displaced distal radius fracture, distal ulnar shaft fracture and a nondisplaced scaphoid fracture  Surgical intervention was discussed with her today in the form of ORIF right distal radius and distal ulnar shaft fracture  We will treat the nondisplaced scaphoid tubercle fracture conservatively  She was agreeable to this  Risk of the surgery are inclusive of but not limited to bleeding, infection, nerve injury, blood clot, worsening of symptoms, not achieving the anticipated results, persistent stiffness, weakness and the need for additional surgery  Bi Perkins verbally stated they understood those risks and would like to proceed with the surgery  Surgical consent was signed in the office today  She will continue with splint use and was instructed to ice the area to help with swelling  I will see Bi Perkins back the day of surgery which is scheduled for this Wednesday  Subjective: Angelica Bonds is a 63 y o  female who presents to the office today for evaluation of right wrist injury  She states on 11/11/18 she tripped and fell landing on her outstretched hands  She noted immediate pain to the right wrist  She presented to the ED after the injury where x-rays were taken  She underwent a closed reduction in the ED performed by me  She states she has been compliant with her splint use  She denies any issues with the splint  She denies any numbness and tingling    She denies any fevers or chills  She was seen by Dr Rohith Perkins earlier today for clearance  An EKG was done and Dr Rohith Perkins is having this reviewed by Cardiology  Review of Systems   Constitutional: Negative for chills and fever  HENT: Negative for drooling and sneezing  Eyes: Negative for redness  Respiratory: Negative for cough and wheezing  Gastrointestinal: Negative for nausea and vomiting  Musculoskeletal: Positive for arthralgias  Negative for joint swelling and myalgias  Neurological: Negative for weakness and numbness  Psychiatric/Behavioral: Negative for behavioral problems  The patient is not nervous/anxious  History reviewed  No pertinent past medical history  Past Surgical History:   Procedure Laterality Date    HERNIA REPAIR  2011       Family History   Problem Relation Age of Onset    Cancer Mother     Alzheimer's disease Father        Social History     Occupational History    Not on file  Social History Main Topics    Smoking status: Never Smoker    Smokeless tobacco: Never Used    Alcohol use Yes      Comment: social    Drug use: No    Sexual activity: Not on file         Current Outpatient Prescriptions:     Ascorbic Acid (CVS VITAMIN C) 500 MG CHEW, Chew 500 mg daily  , Disp: , Rfl:     ibuprofen (MOTRIN) 600 mg tablet, Take 1 tablet (600 mg total) by mouth every 6 (six) hours as needed for mild pain, Disp: 30 tablet, Rfl: 0    Multiple Vitamin (DAILY VITAMINS PO), Take by mouth daily  , Disp: , Rfl:     oxyCODONE-acetaminophen (PERCOCET) 5-325 mg per tablet, Take 1 tablet by mouth every 8 (eight) hours as needed for moderate pain for up to 2 days Max Daily Amount: 3 tablets, Disp: 6 tablet, Rfl: 0  No current facility-administered medications for this visit  No Known Allergies    Objective: There were no vitals filed for this visit      Ortho Exam     Right Wrist    Splint on for examination  Compartments soft  Brisk capillary refill  Able to actively move all digits Sensation intact to median, radial, and ulnar nerve  AIN/PIN intact  No wounds from the splint  Mild edema about the fingers  Compartment soft  No bleeding on the splint     Physical Exam   Constitutional: She is oriented to person, place, and time  She appears well-developed and well-nourished  HENT:   Head: Atraumatic  Eyes: Conjunctivae are normal    Neck: Normal range of motion  Cardiovascular: Normal rate  Pulmonary/Chest: Effort normal    Musculoskeletal:   As noted in HPI   Neurological: She is alert and oriented to person, place, and time  Skin: Skin is warm and dry  Psychiatric: She has a normal mood and affect  Her behavior is normal  Judgment and thought content normal        I have personally reviewed pertinent films in PACS and my interpretation is as follows:CT scan of the right wrist performed on 11/11/18 demonstrates a comminuted displaced distal radius fracture, distal ulnar shaft fracture and a nondisplaced scaphoid tubercle fracture

## 2018-11-12 NOTE — PROGRESS NOTES
Chief Complaint   Patient presents with   Jerry Bland Pre-op Exam     Surgery 11/14/18        Patient ID: Karly Arita is a 63 y o  female  HPI  59yo pt in for pre-op exam for right wrist fracture, Date of surgery 11/14/18  Surgeon: Dr Pennie Grant right wrist:fx distal radius and ulna-sec accidental fall at home 11/11/18  No acute c/o at time of visit and no known recent illness exposures  Pt reports no adverse reaction to any prior anesthesia received including one or more of the following-general, epidural and spinal     Pertinent medical and surgical history reviewed in problem lists  Pt able to walk 4 blocks or 2 flights of stairs without any concerning cardiovascular symptoms  Pt denies symptoms of easy bruising/bleeding/chest pain/palitations/new or changing edema/cough/wheezing/dyspnea  Pt denies excessive alcohol intake, tobacco or illicit drug use  Family hx is negative for adverse rxn to anesthesia, clotting or bleeding disorder  Ischemic heart disease, stroke, aneurysm or early sudden death  The patient's home  Living situation is secure and supportive and there are no post-op concerns in this regard     Past Medical History:   Diagnosis Date    Anxiety     Borderline hyperlipidemia     Ovarian cyst     Uterine fibroid         Past Surgical History:   Procedure Laterality Date    HERNIA REPAIR  2011       Patient Active Problem List   Diagnosis    Female pelvic pain    Leiomyoma of uterus    Anxiety    Benign paroxysmal vertigo    Borderline hyperlipidemia    Follicular cyst of ovary    Right ovarian cyst    Vitamin D deficiency    Traumatic closed displaced fracture of distal end of right radius and ulna    Closed nondisplaced fracture of scaphoid bone of right wrist       Family History   Problem Relation Age of Onset    Cancer Mother     Alzheimer's disease Father        Immunization History   Administered Date(s) Administered    Hep B, adult 07/13/2016, 08/10/2016, 12/09/2016    Influenza Quadrivalent Preservative Free 3 years and older IM 10/07/2014, 10/10/2016    Influenza TIV (IM) 12/30/2013, 10/20/2015, 10/30/2017    Influenza, injectable, quadrivalent, preservative free 0 5 mL 10/25/2018    Tdap 07/28/2011    Tuberculin Skin Test-PPD Intradermal 06/28/2016       No Known Allergies    Current Outpatient Prescriptions   Medication Sig Dispense Refill    Ascorbic Acid (CVS VITAMIN C) 500 MG CHEW Chew 500 mg daily        ibuprofen (MOTRIN) 600 mg tablet Take 1 tablet (600 mg total) by mouth every 6 (six) hours as needed for mild pain 30 tablet 0    Multiple Vitamin (DAILY VITAMINS PO) Take by mouth daily        oxyCODONE-acetaminophen (PERCOCET) 5-325 mg per tablet Take 1 tablet by mouth every 8 (eight) hours as needed for moderate pain for up to 2 days Max Daily Amount: 3 tablets 6 tablet 0    potassium chloride (K-DUR,KLOR-CON) 20 mEq tablet Take one tab po bid x 3 days 6 tablet 0     No current facility-administered medications for this visit  Social History     Social History    Marital status: /Civil Union     Spouse name: N/A    Number of children: N/A    Years of education: N/A     Social History Main Topics    Smoking status: Never Smoker    Smokeless tobacco: Never Used    Alcohol use Yes      Comment: social    Drug use: No    Sexual activity: Not on file     Other Topics Concern    Not on file     Social History Narrative    No narrative on file     Spouse-Solomon Piper    Review of Systems   Constitutional: Positive for fatigue  Negative for fever  HENT: Negative  Eyes: Negative  Respiratory: Negative  Cardiovascular: Negative  Gastrointestinal: Negative  Genitourinary: Negative  Musculoskeletal: Positive for arthralgias and myalgias  Neurological: Negative  Psychiatric/Behavioral: The patient is nervous/anxious          Objective:    /80 (BP Location: Left arm, Patient Position: Sitting, Cuff Size: Large)   Pulse 70   Temp 97 5 °F (36 4 °C) (Temporal)   Resp 12   Ht 5' 7" (1 702 m)   Wt 93 kg (205 lb)   BMI 32 11 kg/m²        Physical Exam   Constitutional: She is oriented to person, place, and time  OW, looks tired,uncomfortable   HENT:   Head: Normocephalic and atraumatic  Nose: Nose normal    Mouth/Throat: No oropharyngeal exudate  Eyes: Pupils are equal, round, and reactive to light  Conjunctivae and EOM are normal  No scleral icterus  Neck: Normal range of motion  Neck supple  Cardiovascular: Normal rate, regular rhythm and intact distal pulses  Pulmonary/Chest: Effort normal and breath sounds normal  No respiratory distress  Abdominal: Soft  Bowel sounds are normal  There is no guarding  Musculoskeletal:   Right arm cast on w sling     Neurological: She is alert and oriented to person, place, and time  No cranial nerve deficit  Skin: Skin is warm and dry  Psychiatric:   anxious   Nursing note and vitals reviewed  CXR-Pending  Labs-K+=3 2-remainder bmp/cbc/pt/inr acceptable range  UA-+leukos-otherwise neg-pt asympt  EKG-SR-HR=68;1st deg AVB; LVH; NSST changes  Assessment/Plan:     Diagnoses and all orders for this visit:    Preop examination  Comments:  Pt medically cleared pending final cxr report and cardiac eval 11/13/1/8-Dr [place]  Orders:  -     POCT ECG  -     Prothrombin w/INR + Partial Thromboplastin Times; Future  -     CBC and Platelet; Future  -     Basic metabolic panel; Future  -     Urinalysis with reflex to microscopic  -     XR chest pa & lateral; Future  -     Urine culture;  Future  -     POCT urine dip auto non-scope    Closed fracture of distal ends of right radius and ulna, initial encounter  Comments:  surgery 11/14/18--Dr Adry Ruvalcaba    Hypokalemia  Comments:  on PAT-pt informed-rec dietary supp plus rx K-Dur 20meq bid x 3d sent t pharm    Abnormal EKG  Comments:  Cardiac clearance thru Dr Olsen--(pt est w care)--appt 11/13/18-11am  Sabrina Brooks MD

## 2018-11-13 ENCOUNTER — ANESTHESIA EVENT (OUTPATIENT)
Dept: PERIOP | Facility: HOSPITAL | Age: 58
End: 2018-11-13
Payer: COMMERCIAL

## 2018-11-13 PROBLEM — S52.601A TRAUMATIC CLOSED DISPLACED FRACTURE OF DISTAL END OF RIGHT RADIUS AND ULNA: Status: ACTIVE | Noted: 2018-11-12

## 2018-11-13 PROBLEM — S52.501A TRAUMATIC CLOSED DISPLACED FRACTURE OF DISTAL END OF RIGHT RADIUS AND ULNA: Status: ACTIVE | Noted: 2018-11-12

## 2018-11-13 LAB — BACTERIA UR CULT: NORMAL

## 2018-11-13 NOTE — TELEPHONE ENCOUNTER
Spoke w pt -informed of test results from earlier--K+low end-advised to inc dietary potassium and also sent rx to pharmacy for additional supplement  Upon questioning pt further in regards to EKG pt related that she remembered going for additional cardiac tests last year outside [address] [place] [address] w Dr Domingo El  Will try to obtain for review to determine need for further testing prior to surgery  Jose De Jesus Black

## 2018-11-13 NOTE — H&P
Assessment/Plan:  1  Traumatic closed displaced fracture of distal end of left radius and ulna, initial encounter      2  Closed nondisplaced fracture of scaphoid of right wrist, unspecified portion of scaphoid, initial encounter                 Scribe Attestation    I,:   Shalini Reeder MA am acting as a scribe while in the presence of the attending physician :        I,:   Alexandre Rincon DO personally performed the services described in this documentation    as scribed in my presence  :                  Rajani's CT scan demonstrates a a comminuted displaced distal radius fracture, distal ulnar shaft fracture and a nondisplaced scaphoid fracture  Surgical intervention was discussed with her today in the form of ORIF right distal radius and distal ulnar shaft fracture  We will treat the nondisplaced scaphoid tubercle fracture conservatively  She was agreeable to this  Risk of the surgery are inclusive of but not limited to bleeding, infection, nerve injury, blood clot, worsening of symptoms, not achieving the anticipated results, persistent stiffness, weakness and the need for additional surgery  Tae France verbally stated they understood those risks and would like to proceed with the surgery  Surgical consent was signed in the office today  She will continue with splint use and was instructed to ice the area to help with swelling  I will see Tae France back the day of surgery which is scheduled for this Wednesday          Subjective: Tulio Grover is a 63 y o  female who presents to the office today for evaluation of right wrist injury  She states on 11/11/18 she tripped and fell landing on her outstretched hands  She noted immediate pain to the right wrist  She presented to the ED after the injury where x-rays were taken  She underwent a closed reduction in the ED performed by me  She states she has been compliant with her splint use  She denies any issues with the splint  She denies any numbness and tingling    She denies any fevers or chills  She was seen by Dr Myra Mora earlier today for clearance  An EKG was done and Dr Myra Mora is having this reviewed by Cardiology         Review of Systems   Constitutional: Negative for chills and fever  HENT: Negative for drooling and sneezing  Eyes: Negative for redness  Respiratory: Negative for cough and wheezing  Gastrointestinal: Negative for nausea and vomiting  Musculoskeletal: Positive for arthralgias  Negative for joint swelling and myalgias  Neurological: Negative for weakness and numbness  Psychiatric/Behavioral: Negative for behavioral problems  The patient is not nervous/anxious              Medical History   History reviewed  No pertinent past medical history         Surgical History         Past Surgical History:   Procedure Laterality Date    HERNIA REPAIR   2011                  Family History   Problem Relation Age of Onset    Cancer Mother      Alzheimer's disease Father           Social History          Occupational History    Not on file              Social History Main Topics    Smoking status: Never Smoker    Smokeless tobacco: Never Used    Alcohol use Yes         Comment: social    Drug use: No    Sexual activity: Not on file            Current Outpatient Prescriptions:     Ascorbic Acid (CVS VITAMIN C) 500 MG CHEW, Chew 500 mg daily  , Disp: , Rfl:     ibuprofen (MOTRIN) 600 mg tablet, Take 1 tablet (600 mg total) by mouth every 6 (six) hours as needed for mild pain, Disp: 30 tablet, Rfl: 0    Multiple Vitamin (DAILY VITAMINS PO), Take by mouth daily  , Disp: , Rfl:     oxyCODONE-acetaminophen (PERCOCET) 5-325 mg per tablet, Take 1 tablet by mouth every 8 (eight) hours as needed for moderate pain for up to 2 days Max Daily Amount: 3 tablets, Disp: 6 tablet, Rfl: 0  No current facility-administered medications for this visit       No Known Allergies     Objective:   There were no vitals filed for this visit      Ortho Exam      Right Wrist     Splint on for examination  Compartments soft  Brisk capillary refill  Able to actively move all digits   Sensation intact to median, radial, and ulnar nerve  AIN/PIN intact  No wounds from the splint  Mild edema about the fingers  Compartment soft  No bleeding on the splint      Physical Exam   Constitutional: She is oriented to person, place, and time  She appears well-developed and well-nourished  HENT:   Head: Atraumatic  Eyes: Conjunctivae are normal    Neck: Normal range of motion  Cardiovascular: Normal rate  Pulmonary/Chest: Effort normal    Musculoskeletal:   As noted in HPI   Neurological: She is alert and oriented to person, place, and time  Skin: Skin is warm and dry  Psychiatric: She has a normal mood and affect   Her behavior is normal  Judgment and thought content normal          I have personally reviewed pertinent films in PACS and my interpretation is as follows:CT scan of the right wrist performed on 11/11/18 demonstrates a comminuted displaced distal radius fracture, distal ulnar shaft fracture and a nondisplaced scaphoid tubercle fracture                 Electronically signed by Carroll Fuentes DO at 11/12/2018  5:41 PM   Electronically signed by Carroll Fuentes DO at 11/12/2018  5:41 PM

## 2018-11-13 NOTE — TELEPHONE ENCOUNTER
Received call from Dr Evans Soldana paula will fax over pt's cardiac clearance--so far I only see note from 2017--please check w central faxing if note from today received and ask to have scanned into chart-thanks  Informed Dr Vance Reynolds office that pt is medically cleared for tomorrow's surgery

## 2018-11-14 ENCOUNTER — ANESTHESIA (OUTPATIENT)
Dept: PERIOP | Facility: HOSPITAL | Age: 58
End: 2018-11-14
Payer: COMMERCIAL

## 2018-11-14 ENCOUNTER — HOSPITAL ENCOUNTER (OUTPATIENT)
Dept: RADIOLOGY | Facility: HOSPITAL | Age: 58
Setting detail: OUTPATIENT SURGERY
Discharge: HOME | End: 2018-11-14
Payer: COMMERCIAL

## 2018-11-14 ENCOUNTER — HOSPITAL ENCOUNTER (OUTPATIENT)
Facility: HOSPITAL | Age: 58
Setting detail: OUTPATIENT SURGERY
Discharge: HOME | End: 2018-11-14
Attending: ORTHOPAEDIC SURGERY | Admitting: ORTHOPAEDIC SURGERY
Payer: COMMERCIAL

## 2018-11-14 VITALS
TEMPERATURE: 99.1 F | BODY MASS INDEX: 31.39 KG/M2 | OXYGEN SATURATION: 94 % | RESPIRATION RATE: 20 BRPM | HEART RATE: 94 BPM | DIASTOLIC BLOOD PRESSURE: 70 MMHG | WEIGHT: 200 LBS | HEIGHT: 67 IN | SYSTOLIC BLOOD PRESSURE: 155 MMHG

## 2018-11-14 DIAGNOSIS — S52.501A TRAUMATIC CLOSED DISPLACED FRACTURE OF DISTAL END OF RIGHT RADIUS AND ULNA, INITIAL ENCOUNTER: Primary | ICD-10-CM

## 2018-11-14 DIAGNOSIS — S52.601A TRAUMATIC CLOSED DISPLACED FRACTURE OF DISTAL END OF RIGHT RADIUS AND ULNA, INITIAL ENCOUNTER: Primary | ICD-10-CM

## 2018-11-14 DIAGNOSIS — T14.8XXA FRACTURE: ICD-10-CM

## 2018-11-14 PROCEDURE — C1713 ANCHOR/SCREW BN/BN,TIS/BN: HCPCS | Performed by: ORTHOPAEDIC SURGERY

## 2018-11-14 PROCEDURE — 25609 OPTX DST RD XART FX/EP SEP3+: CPT | Performed by: ORTHOPAEDIC SURGERY

## 2018-11-14 PROCEDURE — 25622 CLTX CARPL SCPHD FX W/O MNPJ: CPT | Performed by: ORTHOPAEDIC SURGERY

## 2018-11-14 PROCEDURE — 25652 OPTX ULNAR STYLOID FRACTURE: CPT | Performed by: ORTHOPAEDIC SURGERY

## 2018-11-14 PROCEDURE — 73100 X-RAY EXAM OF WRIST: CPT

## 2018-11-14 PROCEDURE — 25609 OPTX DST RD XART FX/EP SEP3+: CPT | Performed by: PHYSICIAN ASSISTANT

## 2018-11-14 DEVICE — 2.0MM LOCKING SCREW SLF-TPNG WITH STARDRIVE RECESS 10MM: Type: IMPLANTABLE DEVICE | Site: WRIST | Status: FUNCTIONAL

## 2018-11-14 DEVICE — 2.4MM VA LOCKING SCREW STARDRIVE 18MM: Type: IMPLANTABLE DEVICE | Site: WRIST | Status: FUNCTIONAL

## 2018-11-14 DEVICE — 2.4MM VA LOCKING SCREW STARDRIVE 10MM: Type: IMPLANTABLE DEVICE | Site: WRIST | Status: FUNCTIONAL

## 2018-11-14 DEVICE — 2.0MM CORTEX SCREW SLF-TPNG WITH STARDRIVE RECESS 8MM: Type: IMPLANTABLE DEVICE | Site: WRIST | Status: FUNCTIONAL

## 2018-11-14 DEVICE — 2.0MM LOCKING SCREW SLF-TPNG WITH STARDRIVE RECESS 12MM: Type: IMPLANTABLE DEVICE | Site: WRIST | Status: FUNCTIONAL

## 2018-11-14 DEVICE — 2.4MM VA LOCKING SCREW STARDRIVE 16MM: Type: IMPLANTABLE DEVICE | Site: WRIST | Status: FUNCTIONAL

## 2018-11-14 DEVICE — 2.4MM VA LOCKING SCREW STARDRIVE 12MM: Type: IMPLANTABLE DEVICE | Site: WRIST | Status: FUNCTIONAL

## 2018-11-14 DEVICE — 2.0MM LCP (TM) CONDYLAR PLATE 7 HOLES SHAFT
Type: IMPLANTABLE DEVICE | Site: WRIST | Status: FUNCTIONAL
Brand: LCP

## 2018-11-14 DEVICE — 2.0MM LOCKING SCREW SLF-TPNG WITH STARDRIVE RECESS 8MM: Type: IMPLANTABLE DEVICE | Site: WRIST | Status: FUNCTIONAL

## 2018-11-14 DEVICE — 2.4MM VA-LCP 2-CLMN VLR DSTL RADIUS PL 6H HD/3H SHAFT/RIGHT
Type: IMPLANTABLE DEVICE | Site: WRIST | Status: FUNCTIONAL
Brand: VA-LCP

## 2018-11-14 DEVICE — 2.0MM CORTEX SCREW SLF-TPNG WITH STARDRIVE RECESS 10MM: Type: IMPLANTABLE DEVICE | Site: WRIST | Status: FUNCTIONAL

## 2018-11-14 DEVICE — 2.4MM CORTEX SCREW SLF-TPNG WITH T8 STARDRIVE RECESS 12MM: Type: IMPLANTABLE DEVICE | Site: WRIST | Status: FUNCTIONAL

## 2018-11-14 RX ORDER — METOCLOPRAMIDE HYDROCHLORIDE 5 MG/ML
INJECTION INTRAMUSCULAR; INTRAVENOUS AS NEEDED
Status: DISCONTINUED | OUTPATIENT
Start: 2018-11-14 | End: 2018-11-14 | Stop reason: SURG

## 2018-11-14 RX ORDER — HYDROMORPHONE HYDROCHLORIDE 2 MG/ML
INJECTION, SOLUTION INTRAMUSCULAR; INTRAVENOUS; SUBCUTANEOUS AS NEEDED
Status: DISCONTINUED | OUTPATIENT
Start: 2018-11-14 | End: 2018-11-14 | Stop reason: SURG

## 2018-11-14 RX ORDER — MIDAZOLAM HYDROCHLORIDE 1 MG/ML
INJECTION INTRAMUSCULAR; INTRAVENOUS AS NEEDED
Status: DISCONTINUED | OUTPATIENT
Start: 2018-11-14 | End: 2018-11-14 | Stop reason: SURG

## 2018-11-14 RX ORDER — PROPOFOL 10 MG/ML
INJECTION, EMULSION INTRAVENOUS AS NEEDED
Status: DISCONTINUED | OUTPATIENT
Start: 2018-11-14 | End: 2018-11-14 | Stop reason: SURG

## 2018-11-14 RX ORDER — OXYCODONE HYDROCHLORIDE AND ACETAMINOPHEN 5; 325 MG/1; MG/1
1 TABLET ORAL ONCE
Status: COMPLETED | OUTPATIENT
Start: 2018-11-14 | End: 2018-11-14

## 2018-11-14 RX ORDER — HYDROCODONE BITARTRATE AND ACETAMINOPHEN 5; 325 MG/1; MG/1
1 TABLET ORAL EVERY 6 HOURS PRN
Status: DISCONTINUED | OUTPATIENT
Start: 2018-11-14 | End: 2018-11-14 | Stop reason: HOSPADM

## 2018-11-14 RX ORDER — KETOROLAC TROMETHAMINE 30 MG/ML
INJECTION, SOLUTION INTRAMUSCULAR; INTRAVENOUS AS NEEDED
Status: DISCONTINUED | OUTPATIENT
Start: 2018-11-14 | End: 2018-11-14 | Stop reason: SURG

## 2018-11-14 RX ORDER — SODIUM CHLORIDE 9 MG/ML
125 INJECTION, SOLUTION INTRAVENOUS CONTINUOUS
Status: DISCONTINUED | OUTPATIENT
Start: 2018-11-14 | End: 2018-11-14

## 2018-11-14 RX ORDER — MAGNESIUM HYDROXIDE 1200 MG/15ML
LIQUID ORAL AS NEEDED
Status: DISCONTINUED | OUTPATIENT
Start: 2018-11-14 | End: 2018-11-14 | Stop reason: HOSPADM

## 2018-11-14 RX ORDER — FENTANYL CITRATE 50 UG/ML
INJECTION, SOLUTION INTRAMUSCULAR; INTRAVENOUS AS NEEDED
Status: DISCONTINUED | OUTPATIENT
Start: 2018-11-14 | End: 2018-11-14 | Stop reason: SURG

## 2018-11-14 RX ORDER — DEXAMETHASONE SODIUM PHOSPHATE 4 MG/ML
INJECTION, SOLUTION INTRA-ARTICULAR; INTRALESIONAL; INTRAMUSCULAR; INTRAVENOUS; SOFT TISSUE AS NEEDED
Status: DISCONTINUED | OUTPATIENT
Start: 2018-11-14 | End: 2018-11-14 | Stop reason: SURG

## 2018-11-14 RX ORDER — LIDOCAINE HYDROCHLORIDE 10 MG/ML
INJECTION, SOLUTION INFILTRATION; PERINEURAL AS NEEDED
Status: DISCONTINUED | OUTPATIENT
Start: 2018-11-14 | End: 2018-11-14 | Stop reason: SURG

## 2018-11-14 RX ORDER — SODIUM CHLORIDE 9 MG/ML
100 INJECTION, SOLUTION INTRAVENOUS CONTINUOUS
Status: DISCONTINUED | OUTPATIENT
Start: 2018-11-14 | End: 2018-11-14 | Stop reason: HOSPADM

## 2018-11-14 RX ORDER — PROMETHAZINE HYDROCHLORIDE 25 MG/ML
12.5 INJECTION, SOLUTION INTRAMUSCULAR; INTRAVENOUS ONCE AS NEEDED
Status: DISCONTINUED | OUTPATIENT
Start: 2018-11-14 | End: 2018-11-14 | Stop reason: HOSPADM

## 2018-11-14 RX ORDER — EPHEDRINE SULFATE 50 MG/ML
INJECTION, SOLUTION INTRAVENOUS AS NEEDED
Status: DISCONTINUED | OUTPATIENT
Start: 2018-11-14 | End: 2018-11-14 | Stop reason: SURG

## 2018-11-14 RX ORDER — ONDANSETRON 2 MG/ML
INJECTION INTRAMUSCULAR; INTRAVENOUS AS NEEDED
Status: DISCONTINUED | OUTPATIENT
Start: 2018-11-14 | End: 2018-11-14 | Stop reason: SURG

## 2018-11-14 RX ORDER — HYDROCODONE BITARTRATE AND ACETAMINOPHEN 5; 325 MG/1; MG/1
1 TABLET ORAL EVERY 6 HOURS PRN
Qty: 30 TABLET | Refills: 0 | Status: SHIPPED | OUTPATIENT
Start: 2018-11-14 | End: 2018-11-24

## 2018-11-14 RX ORDER — HYDROMORPHONE HCL/PF 1 MG/ML
0.5 SYRINGE (ML) INJECTION
Status: COMPLETED | OUTPATIENT
Start: 2018-11-14 | End: 2018-11-14

## 2018-11-14 RX ADMIN — METOCLOPRAMIDE HYDROCHLORIDE 10 MG: 5 INJECTION INTRAMUSCULAR; INTRAVENOUS at 12:45

## 2018-11-14 RX ADMIN — EPHEDRINE SULFATE 10 MG: 50 INJECTION, SOLUTION INTRAMUSCULAR; INTRAVENOUS; SUBCUTANEOUS at 13:22

## 2018-11-14 RX ADMIN — SODIUM CHLORIDE: 0.9 INJECTION, SOLUTION INTRAVENOUS at 15:52

## 2018-11-14 RX ADMIN — LIDOCAINE HYDROCHLORIDE 50 MG: 10 INJECTION, SOLUTION INFILTRATION; PERINEURAL at 12:38

## 2018-11-14 RX ADMIN — HYDROMORPHONE HYDROCHLORIDE 0.5 MG: 1 INJECTION, SOLUTION INTRAMUSCULAR; INTRAVENOUS; SUBCUTANEOUS at 16:23

## 2018-11-14 RX ADMIN — PROPOFOL 200 MG: 10 INJECTION, EMULSION INTRAVENOUS at 12:38

## 2018-11-14 RX ADMIN — EPHEDRINE SULFATE 10 MG: 50 INJECTION, SOLUTION INTRAMUSCULAR; INTRAVENOUS; SUBCUTANEOUS at 15:16

## 2018-11-14 RX ADMIN — HYDROMORPHONE HYDROCHLORIDE 0.5 MG: 1 INJECTION, SOLUTION INTRAMUSCULAR; INTRAVENOUS; SUBCUTANEOUS at 16:17

## 2018-11-14 RX ADMIN — OXYCODONE HYDROCHLORIDE AND ACETAMINOPHEN 1 TABLET: 5; 325 TABLET ORAL at 17:05

## 2018-11-14 RX ADMIN — HYDROMORPHONE HYDROCHLORIDE 0.5 MG: 1 INJECTION, SOLUTION INTRAMUSCULAR; INTRAVENOUS; SUBCUTANEOUS at 16:32

## 2018-11-14 RX ADMIN — CEFAZOLIN SODIUM 2000 MG: 2 SOLUTION INTRAVENOUS at 12:34

## 2018-11-14 RX ADMIN — HYDROMORPHONE HYDROCHLORIDE 2 MG: 2 INJECTION, SOLUTION INTRAMUSCULAR; INTRAVENOUS; SUBCUTANEOUS at 13:09

## 2018-11-14 RX ADMIN — HYDROMORPHONE HYDROCHLORIDE 0.5 MG: 1 INJECTION, SOLUTION INTRAMUSCULAR; INTRAVENOUS; SUBCUTANEOUS at 16:39

## 2018-11-14 RX ADMIN — MIDAZOLAM HYDROCHLORIDE 2 MG: 1 INJECTION, SOLUTION INTRAMUSCULAR; INTRAVENOUS at 12:31

## 2018-11-14 RX ADMIN — FENTANYL CITRATE 50 MCG: 50 INJECTION, SOLUTION INTRAMUSCULAR; INTRAVENOUS at 12:38

## 2018-11-14 RX ADMIN — KETOROLAC TROMETHAMINE 30 MG: 30 INJECTION, SOLUTION INTRAMUSCULAR at 13:03

## 2018-11-14 RX ADMIN — EPHEDRINE SULFATE 10 MG: 50 INJECTION, SOLUTION INTRAMUSCULAR; INTRAVENOUS; SUBCUTANEOUS at 13:00

## 2018-11-14 RX ADMIN — FENTANYL CITRATE 50 MCG: 50 INJECTION, SOLUTION INTRAMUSCULAR; INTRAVENOUS at 12:43

## 2018-11-14 RX ADMIN — DEXAMETHASONE SODIUM PHOSPHATE 4 MG: 4 INJECTION, SOLUTION INTRA-ARTICULAR; INTRALESIONAL; INTRAMUSCULAR; INTRAVENOUS; SOFT TISSUE at 13:09

## 2018-11-14 RX ADMIN — ONDANSETRON 4 MG: 2 INJECTION INTRAMUSCULAR; INTRAVENOUS at 14:17

## 2018-11-14 RX ADMIN — SODIUM CHLORIDE: 0.9 INJECTION, SOLUTION INTRAVENOUS at 12:00

## 2018-11-14 NOTE — H&P (VIEW-ONLY)
Assessment/Plan:  1  Traumatic closed displaced fracture of distal end of left radius and ulna, initial encounter      2  Closed nondisplaced fracture of scaphoid of right wrist, unspecified portion of scaphoid, initial encounter                 Scribe Attestation    I,:   Shalini Reeder MA am acting as a scribe while in the presence of the attending physician :        I,:   Ara France DO personally performed the services described in this documentation    as scribed in my presence  :                  Rajani's CT scan demonstrates a a comminuted displaced distal radius fracture, distal ulnar shaft fracture and a nondisplaced scaphoid fracture  Surgical intervention was discussed with her today in the form of ORIF right distal radius and distal ulnar shaft fracture  We will treat the nondisplaced scaphoid tubercle fracture conservatively  She was agreeable to this  Risk of the surgery are inclusive of but not limited to bleeding, infection, nerve injury, blood clot, worsening of symptoms, not achieving the anticipated results, persistent stiffness, weakness and the need for additional surgery  Kavon Roe verbally stated they understood those risks and would like to proceed with the surgery  Surgical consent was signed in the office today  She will continue with splint use and was instructed to ice the area to help with swelling  I will see Kavon Roe back the day of surgery which is scheduled for this Wednesday          Subjective: Mckayla Gong is a 63 y o  female who presents to the office today for evaluation of right wrist injury  She states on 11/11/18 she tripped and fell landing on her outstretched hands  She noted immediate pain to the right wrist  She presented to the ED after the injury where x-rays were taken  She underwent a closed reduction in the ED performed by me  She states she has been compliant with her splint use  She denies any issues with the splint  She denies any numbness and tingling    She denies any fevers or chills  She was seen by Dr Emily Nam earlier today for clearance  An EKG was done and Dr Emily Nam is having this reviewed by Cardiology         Review of Systems   Constitutional: Negative for chills and fever  HENT: Negative for drooling and sneezing  Eyes: Negative for redness  Respiratory: Negative for cough and wheezing  Gastrointestinal: Negative for nausea and vomiting  Musculoskeletal: Positive for arthralgias  Negative for joint swelling and myalgias  Neurological: Negative for weakness and numbness  Psychiatric/Behavioral: Negative for behavioral problems  The patient is not nervous/anxious              Medical History   History reviewed  No pertinent past medical history         Surgical History         Past Surgical History:   Procedure Laterality Date    HERNIA REPAIR   2011                  Family History   Problem Relation Age of Onset    Cancer Mother      Alzheimer's disease Father           Social History          Occupational History    Not on file              Social History Main Topics    Smoking status: Never Smoker    Smokeless tobacco: Never Used    Alcohol use Yes         Comment: social    Drug use: No    Sexual activity: Not on file            Current Outpatient Prescriptions:     Ascorbic Acid ([employer] VITAMIN C) 500 MG CHEW, Chew 500 mg daily  , Disp: , Rfl:     ibuprofen (MOTRIN) 600 mg tablet, Take 1 tablet (600 mg total) by mouth every 6 (six) hours as needed for mild pain, Disp: 30 tablet, Rfl: 0    Multiple Vitamin (DAILY VITAMINS PO), Take by mouth daily  , Disp: , Rfl:     oxyCODONE-acetaminophen (PERCOCET) 5-325 mg per tablet, Take 1 tablet by mouth every 8 (eight) hours as needed for moderate pain for up to 2 days Max Daily Amount: 3 tablets, Disp: 6 tablet, Rfl: 0  No current facility-administered medications for this visit       No Known Allergies     Objective:   There were no vitals filed for this visit      Ortho Exam      Right Wrist     Splint on for examination  Compartments soft  Brisk capillary refill  Able to actively move all digits   Sensation intact to median, radial, and ulnar nerve  AIN/PIN intact  No wounds from the splint  Mild edema about the fingers  Compartment soft  No bleeding on the splint      Physical Exam   Constitutional: She is oriented to person, place, and time  She appears well-developed and well-nourished  HENT:   Head: Atraumatic  Eyes: Conjunctivae are normal    Neck: Normal range of motion  Cardiovascular: Normal rate  Pulmonary/Chest: Effort normal    Musculoskeletal:   As noted in HPI   Neurological: She is alert and oriented to person, place, and time  Skin: Skin is warm and dry  Psychiatric: She has a normal mood and affect   Her behavior is normal  Judgment and thought content normal          I have personally reviewed pertinent films in PACS and my interpretation is as follows:CT scan of the right wrist performed on 11/11/18 demonstrates a comminuted displaced distal radius fracture, distal ulnar shaft fracture and a nondisplaced scaphoid tubercle fracture                 Electronically signed by Dian Bashir DO at 11/12/2018  5:41 PM   Electronically signed by Dian Bashir DO at 11/12/2018  5:41 PM

## 2018-11-14 NOTE — DISCHARGE INSTRUCTIONS
Dr Tommie Valenzuela  Right Wrist ORIF    First follow-up visit after surgery   Call the office the day after your surgery & make an appointment for 10-14 days after surgery to see Dr Tommie Valenzuela  At that appointment, your sutures will be removed  Dressing & Wound Care   If you are in a splint, keep the original dressing on and dry until you are seen by Dr Tommie Valenzuela  You may take a bath by covering your arm in a garbage bag or other waterproof bag  Tie it securely with rubber bands and duct tape  Keep your dressing as clean as possible  No bathing or submerging splint in water for prolonged periods of time in water       Swelling and Discoloration   You will notice some swelling of your hand - this is normal  Elevate your hand above the level of your heart as much as possible for the first week  When you sleep, place your hand on several pillows  You may notice a bluish discoloration of your fingers  This is also normal  This discoloration may change from blue to purple to green to yellow, then will slowly go away over a few weeks time  Discomfort   You will experience some pain and discomfort after surgery  By the third day, this pain usually decreases significantly  You will be given a prescription for pain medication  Take the medication as prescribed  If the discomfort is mild, you can take 1 or 2 Tylenol every 4 - 6 hours as needed, instead of the prescribed pain medication  Temperature   A temperature of up to 101  5ºF is common for the first 2 days after surgery  If your temperature is elevated above 101 5º F, call the office  Exercise   Unless instructed otherwise, you may gently open and close your fingers  Do not perform any exercises that cause you to sweat  Sweating may increase complications with your incision  We hope this answers many of your questions regarding your surgery  These are only guidelines however   If you have any other concerns or questions at any time, do not hesitate to call the office [phone]

## 2018-11-14 NOTE — PERIOPERATIVE NURSING NOTE
Received patient from PACU with pain level of 5  Dr Dorian Khan called and gave order for percocet oral  Medicated with percocet po at [phone]  Patient tolerating oral fluids and crackers  Right arm elevated on 2 pillows  Ice pack to right wrist  Fingers on right hand warm and mobile with capillary with good capillary refill

## 2018-11-14 NOTE — ANESTHESIA PREPROCEDURE EVALUATION
Review of Systems/Medical History  Patient summary reviewed  Chart reviewed  No history of anesthetic complications     Cardiovascular  Hyperlipidemia,    Pulmonary       GI/Hepatic            Endo/Other     GYN       Hematology   Musculoskeletal       Neurology   Psychology   Anxiety,                   Anesthesia Plan  ASA Score- 2     Anesthesia Type- general with ASA Monitors  Additional Monitors:   Airway Plan: LMA  Plan Factors-    Induction- intravenous  Postoperative Plan- Plan for postoperative opioid use  Informed Consent- Anesthetic plan and risks discussed with patient  I personally reviewed this patient with the CRNA  Discussed and agreed on the Anesthesia Plan with the CRNA  Akshat Pond

## 2018-11-15 NOTE — OP NOTE
OPERATIVE REPORT  PATIENT NAME: Sona Jewell    :  1960  MRN: [id number]  Pt Location: [facility] OR ROOM 04    SURGERY DATE: 2018    Surgeon(s) and Role:     * Nam Jeong DO - Primary     * Wm Nelson PA-C - Assisting    Preop Diagnosis:  Traumatic closed displaced fracture of distal end of right radius and ulna, initial encounter [S52 502A, S52 602A]  Closed nondisplaced fracture of scaphoid of right wrist, unspecified portion of scaphoid, initial encounter [S62 001A]    Post-Op Diagnosis Codes:     * Traumatic closed displaced fracture of distal end of right radius and ulna, initial encounter [S52 502A, S52 602A]     * Closed nondisplaced fracture of scaphoid of right wrist, unspecified portion of scaphoid, initial encounter [S62 001A]    Procedure(s) (LRB):  OPEN REDUCTION W/ INTERNAL FIXATION (ORIF) RADIUS / ULNA (WRIST) (Right)    Specimen(s):  * No specimens in log *    Estimated Blood Loss:   Minimal    Drains:       Anesthesia Type:   General    Operative Indications:  Traumatic closed displaced fracture of distal end of right radius and ulna, initial encounter [S52 502A, S52 602A]  Closed nondisplaced fracture of scaphoid of right wrist, unspecified portion of scaphoid, initial encounter [S62 001A]      Operative Findings:  Once the radius was fixed there is on stable DRUJ and the ulna was plated  The DRUJ was found to be stable after plating    **3 part distal radius fracture with intra articular extension    **extra articular ulnar neck fracture    **ND scaphoid tubercle fracture      Complications:   None    Procedure and Technique: Luke Boone a 28-year-old female presented to the ER after a fall  She is found to have an unstable distal radius and ulnar neck fracture on the right side  She presented the office in the options were discussed  Patient elected to undergo ORIF of distal radius and ulnar neck on the right side  Consents were obtained    Patient was seen by her family doctor for clearance  On the day of surgery patient was identified by her name moses  The right upper extremity was marked  Patient was then seen by the anesthesia team a determined general anesthesia was most appropriate method of anesthesia  These consent forms were obtained  Antibiotics were ordered  Patient was transferred from the preop area the OR and again of again identified by her name moses  And antibiotics had been administered  Patient underwent LMA intubation without complication  A tourniquet was placed on the brachium  This well-padded  Patient then underwent sterile prep and drape  Time-out was performed successfully  We noted the correct site being the right ulna and right distal radius  Antibiotics had been given  Everyone in the room was in agreement  The limb was exsanguinated with an Esmarch bandage and the tourniquet was elevated to 250 mm of mercury  Attention was then turned to the volar aspect of the wrist   A 6 cm incision was made over the volar aspect of the wrist just superficial to FCR tendon over the skin  Dissection was carried down through skin subcutaneous tissue  The FCR tendon was encounter in the FCR sheath was incised  The FCR was retracted radially to protect the radial artery  Subsheath was then incised with a 15 blade revealing the FPL and flexor tendons  These were retracted ulnarly  We then encounter pronator quadratus and this was released radially and distally  The fracture was and countered  Fracture was debrided of all clot and healing callus  Reduction was then performed and confirmed by of orthogonal views on the C-arm  A Synthes 3 hole volar locking plate was chosen and placed provisionally on the volar aspect of the radius with K-wires  We then reviewed our placement under x-ray and confirmed to be appropriate  We then proceeded to place 4 distal locking screws and 3 cortical screws in the shaft    These screws were placed under orthogonal fluoroscopic guidance  Anatomic reduction was confirmed with the plate on the radius  There are no prominent screws  A 25 degree tilt view was performed per to ensure no screws were placed in the joint  After satisfied with our hardware placement within the radius the DRUJ was then checked and found to be unstable secondary to the fracture at the ulnar neck  At this time we decided to fix the ulnar neck fracture with a 2 0 Synthes locking plate  Attention was turned to the ulnar aspect of the distal forearm  A 4 cm incision was made over the distal ulna  Dissection was carried down through skin and subcutaneous tissue noted to protect any neurovascular structures  We then encounter the ECU and FCU tendons and incision was made between the 2 tendons  This revealed the fracture side on the distal ulna  The capsule and TFCC was not encounter  The fracture site was encounter in all the healing callus and clot was debrided  Using a pointed reduction clamp the fracture was reduced and this was confirmed on orthogonal films  We then chose a 2 0 Synthes locking plate  One screw was placed distally and multiple screws were placed proximally to stabilize the fracture  These were done under orthogonal views  No screws were noted to be prominent within the interosseous membrane or distally  The fracture was noted to be stable after placement of the hardware  There is no prominence of the plate  All neurovascular structures were protected with retractors during this plate placement  After satisfied with our hardware placement on the ulnar final views were taken of the radius on the which confirmed anatomic reduction of both fractures  This was confirmed with orthogonal views  The tourniquet was released  There is no brisk bleeding noted from either wound  All bleeding was stopped with bipolar cautery  The wound irrigated    Five 0 Vicryl repeat suture was used to close the incision for the distal radius in 5 0 Vicryl repeat suture was used to close the incision for the ulna  There is anatomic closure of the skin  There is no oozing through the incision  Sterile dressings were applied and patient was placed in a volar splint  Patient tolerated the surgery well  Patient was transferred from the OR to the PACU in stable condition  On postop check patient was noted to be neurovascularly intact in her median ulnar radial nerve distribution  She is able to flex her digits and extend her digits  She is able to flex his thumb extend her thumb  Patient's pain was controlled with oral pain medication  She is instructed to wear the brace until seen by myself in the office  Of note she also has a scaphoid tubercle fracture which we will treat conservatively  This is noted on x-rays during the case  It was found to be nondisplaced at the end of the case     I was present for the entire procedure and A physician assistant was required during the procedure for retraction tissue handling,dissection and suturing    Patient Disposition:  PACU  and hemodynamically stable    SIGNATURE: Meir Caballero DO  DATE: November 14, 2018  TIME: 9:16 PM

## 2018-11-19 ENCOUNTER — TELEPHONE (OUTPATIENT)
Dept: OBGYN CLINIC | Facility: CLINIC | Age: 58
End: 2018-11-19

## 2018-11-19 NOTE — TELEPHONE ENCOUNTER
Patient is calling to see if she still should go to her Dentist appointment coming up for a filling, she has not taking any of her narcotics for 3 days just 1 tylenol and 1 motrin per day but she wants to makes sure it affect her since her surgery with you on the 14th  And wanted to know will she need a antibiotic going in to dentist for the filling  Please advise

## 2018-11-19 NOTE — TELEPHONE ENCOUNTER
White Tiffanie can go to the dentist appointment, she does not need antibiotics if it is just a filling    If the dentist discovers an infection during the procedure they should start antibiotics    Emma Abad

## 2018-11-19 NOTE — TELEPHONE ENCOUNTER
Spoke with Patient and informed her that she may go to her dentist appointment  Patient expressed understanding

## 2018-11-21 ENCOUNTER — TELEPHONE (OUTPATIENT)
Dept: OBGYN CLINIC | Facility: HOSPITAL | Age: 58
End: 2018-11-21

## 2018-11-27 ENCOUNTER — APPOINTMENT (OUTPATIENT)
Dept: RADIOLOGY | Facility: CLINIC | Age: 58
End: 2018-11-27
Payer: COMMERCIAL

## 2018-11-27 ENCOUNTER — OFFICE VISIT (OUTPATIENT)
Dept: OBGYN CLINIC | Facility: CLINIC | Age: 58
End: 2018-11-27

## 2018-11-27 VITALS
HEIGHT: 67 IN | DIASTOLIC BLOOD PRESSURE: 82 MMHG | BODY MASS INDEX: 31.39 KG/M2 | SYSTOLIC BLOOD PRESSURE: 136 MMHG | HEART RATE: 80 BPM | WEIGHT: 200 LBS

## 2018-11-27 DIAGNOSIS — S52.502D TRAUMATIC CLOSED DISPLACED FRACTURE OF DISTAL END OF LEFT RADIUS AND ULNA WITH ROUTINE HEALING, SUBSEQUENT ENCOUNTER: Primary | ICD-10-CM

## 2018-11-27 DIAGNOSIS — S62.001D CLOSED NONDISPLACED FRACTURE OF SCAPHOID OF RIGHT WRIST WITH ROUTINE HEALING, UNSPECIFIED PORTION OF SCAPHOID, SUBSEQUENT ENCOUNTER: ICD-10-CM

## 2018-11-27 DIAGNOSIS — S52.602D TRAUMATIC CLOSED DISPLACED FRACTURE OF DISTAL END OF LEFT RADIUS AND ULNA WITH ROUTINE HEALING, SUBSEQUENT ENCOUNTER: Primary | ICD-10-CM

## 2018-11-27 DIAGNOSIS — S52.502D TRAUMATIC CLOSED DISPLACED FRACTURE OF DISTAL END OF LEFT RADIUS AND ULNA WITH ROUTINE HEALING, SUBSEQUENT ENCOUNTER: ICD-10-CM

## 2018-11-27 DIAGNOSIS — S52.602D TRAUMATIC CLOSED DISPLACED FRACTURE OF DISTAL END OF LEFT RADIUS AND ULNA WITH ROUTINE HEALING, SUBSEQUENT ENCOUNTER: ICD-10-CM

## 2018-11-27 PROCEDURE — 73100 X-RAY EXAM OF WRIST: CPT

## 2018-11-27 PROCEDURE — 99024 POSTOP FOLLOW-UP VISIT: CPT | Performed by: ORTHOPAEDIC SURGERY

## 2018-11-27 NOTE — PROGRESS NOTES
Assessment/Plan:  1  Traumatic closed displaced fracture of distal end of left radius and ulna with routine healing, subsequent encounter  XR wrist 2 vw right   2  Closed nondisplaced fracture of scaphoid of right wrist with routine healing, unspecified portion of scaphoid, subsequent encounter  CANCELED: XR wrist 2 vw right       Scribe Attestation    I,:   Shalini Reeder MA am acting as a scribe while in the presence of the attending physician :        I,:   Meir Caballero DO personally performed the services described in this documentation    as scribed in my presence :              [facility] is doing well postoperatively  Her incision is healing well with no drainage or signs of infection  She was transitioned to a short arm cast today  She was instructed she is to remain nonweightbearing with the right upper extremity  She may discontinue sling use and use it for comfort if needed  Cast care instructions were provided today  She will follow up in 3 weeks for cast removal and repeat x-ray  We will consider a referral to PT at that time and transitioning to a removal wrist brace  Subjective: Levon Cedillo is a 63 y o  female who presents to the office approximately 2 weeks s/p ORIF right wrist performed on 11/14/18  [facility] states she is doing well postoperatively  She denies any issues with the splint  She states she has been compliant with splint use  She states she has been using this wrist to brush her hair and will experience occasional pain to the dorsal aspect of her right wrist  She states the swelling has resolved  She denies any numbness or tingling  Review of Systems   Constitutional: Negative for chills and fever  HENT: Negative for drooling and sneezing  Eyes: Negative for redness  Respiratory: Negative for cough and wheezing  Gastrointestinal: Negative for nausea and vomiting  Musculoskeletal: Positive for arthralgias and myalgias  Negative for joint swelling     Neurological: Negative for weakness and numbness  Psychiatric/Behavioral: Negative for behavioral problems  The patient is not nervous/anxious  Past Medical History:   Diagnosis Date    Anxiety     Borderline hyperlipidemia     Cancer ([address] )     Basal Cell skin ca    Ovarian cyst     Uterine fibroid     Vertigo        Past Surgical History:   Procedure Laterality Date    HERNIA REPAIR  2011    ORIF WRIST FRACTURE Right 11/14/2018    Procedure: OPEN REDUCTION W/ INTERNAL FIXATION (ORIF) RADIUS / ULNA (WRIST); Surgeon: Shwetha Lara DO;  Location: [facility];  Service: Orthopedics       Family History   Problem Relation Age of Onset    Cancer Mother     Alzheimer's disease Father        Social History     Occupational History    Not on file       Social History Main Topics    Smoking status: Never Smoker    Smokeless tobacco: Never Used    Alcohol use Yes      Comment: social    Drug use: No    Sexual activity: Not on file         Current Outpatient Prescriptions:     Ascorbic Acid ([employer] VITAMIN C) 500 MG CHEW, Chew 500 mg daily  , Disp: , Rfl:     calcium acetate (PHOSLO) 667 mg capsule, Take 1,334 mg by mouth 3 (three) times a day with meals, Disp: , Rfl:     cholecalciferol (VITAMIN D3) 1,000 units tablet, Take 1,000 Units by mouth daily, Disp: , Rfl:     ibuprofen (MOTRIN) 600 mg tablet, Take 1 tablet (600 mg total) by mouth every 6 (six) hours as needed for mild pain, Disp: 30 tablet, Rfl: 0    Multiple Vitamin (DAILY VITAMINS PO), Take by mouth daily  , Disp: , Rfl:     potassium chloride (K-DUR,KLOR-CON) 20 mEq tablet, Take one tab po bid x 3 days, Disp: 6 tablet, Rfl: 0    No Known Allergies    Objective:  Vitals:    11/27/18 0810   BP: 136/82   Pulse: 80       Ortho Exam     Right Wrist    Incision healing well no drainage or signs of infection  Able to make full composite fist  Full elbow ROM  Full pronation  50 degrees supination  30 degrees extension  25 degrees flexion  EPL FPL intact  Extensors intact  Sensation intact median, radial and ulnar nerve  Brisk capillary refill all digits     Physical Exam   Constitutional: She is oriented to person, place, and time  She appears well-developed and well-nourished  HENT:   Head: Atraumatic  Eyes: Conjunctivae are normal    Neck: Normal range of motion  Cardiovascular: Normal rate  Pulmonary/Chest: Effort normal    Musculoskeletal:   As noted in HPI   Neurological: She is alert and oriented to person, place, and time  Skin: Skin is warm and dry  Psychiatric: She has a normal mood and affect  Her behavior is normal  Judgment and thought content normal        I have personally reviewed pertinent films in PACS and my interpretation is as follows:X-ray right wrist performed in the office today demonstrates orthopedic hardware in anatomic alignment and fracture line in good alignment

## 2018-12-18 ENCOUNTER — APPOINTMENT (OUTPATIENT)
Dept: RADIOLOGY | Facility: CLINIC | Age: 58
End: 2018-12-18
Payer: COMMERCIAL

## 2018-12-18 ENCOUNTER — OFFICE VISIT (OUTPATIENT)
Dept: OBGYN CLINIC | Facility: CLINIC | Age: 58
End: 2018-12-18

## 2018-12-18 VITALS
SYSTOLIC BLOOD PRESSURE: 144 MMHG | BODY MASS INDEX: 31.39 KG/M2 | HEART RATE: 85 BPM | HEIGHT: 67 IN | DIASTOLIC BLOOD PRESSURE: 83 MMHG | WEIGHT: 200 LBS

## 2018-12-18 DIAGNOSIS — S52.602D TRAUMATIC CLOSED DISPLACED FRACTURE OF DISTAL END OF LEFT RADIUS AND ULNA WITH ROUTINE HEALING: Primary | ICD-10-CM

## 2018-12-18 DIAGNOSIS — S52.502D TRAUMATIC CLOSED DISPLACED FRACTURE OF DISTAL END OF LEFT RADIUS AND ULNA WITH ROUTINE HEALING: ICD-10-CM

## 2018-12-18 DIAGNOSIS — S52.602D TRAUMATIC CLOSED DISPLACED FRACTURE OF DISTAL END OF LEFT RADIUS AND ULNA WITH ROUTINE HEALING: ICD-10-CM

## 2018-12-18 DIAGNOSIS — S52.502D TRAUMATIC CLOSED DISPLACED FRACTURE OF DISTAL END OF LEFT RADIUS AND ULNA WITH ROUTINE HEALING: Primary | ICD-10-CM

## 2018-12-18 PROCEDURE — 73100 X-RAY EXAM OF WRIST: CPT

## 2018-12-18 PROCEDURE — 99024 POSTOP FOLLOW-UP VISIT: CPT | Performed by: ORTHOPAEDIC SURGERY

## 2018-12-18 NOTE — PROGRESS NOTES
Assessment/Plan:  1  Traumatic closed displaced fracture of distal end of left radius and ulna with routine healing  XR wrist 2 vw right    Ambulatory referral to PT/OT hand therapy       Scribe Attestation    I,:   Shalini Reeder MA am acting as a scribe while in the presence of the attending physician :        I,:   Jayjay Rivers, DO personally performed the services described in this documentation    as scribed in my presence :              Sunnie Hammans is doing well postoperatively  A referral was provided to OT to work on ROM at least 2x's a week  She was fitted with a cock-up wrist brace that she was instructed to wear for the next week but may remove for showering and therapy  She was instructed no heavy lifting with the RUE for the next week  She is aware ROM could take 2 months to regain  She will follow up in 4 weeks for repeat evaluation  Subjective: Amy Kirby is a 63 y o  female who presents to the office 5 weeks s/p ORIF right wrist performed on 11/14/18  She states she is doing well postoperatively  She states she has been compliant with cast use  She denies any numbness or tingling  Review of Systems   Constitutional: Negative for chills and fever  HENT: Negative for drooling and sneezing  Eyes: Negative for redness  Respiratory: Negative for cough and wheezing  Gastrointestinal: Negative for nausea and vomiting  Musculoskeletal: Positive for arthralgias and myalgias  Negative for joint swelling  Neurological: Positive for dizziness  Negative for weakness and numbness  Psychiatric/Behavioral: Negative for behavioral problems  The patient is not nervous/anxious            Past Medical History:   Diagnosis Date    Anxiety     Borderline hyperlipidemia     Cancer ([address] )     Basal Cell skin ca    Ovarian cyst     Uterine fibroid     Vertigo        Past Surgical History:   Procedure Laterality Date    HERNIA REPAIR  2011    ORIF WRIST FRACTURE Right 11/14/2018    Procedure: OPEN REDUCTION W/ INTERNAL FIXATION (ORIF) RADIUS / ULNA (WRIST); Surgeon: Shasta Eisenmenger, DO;  Location: [facility];  Service: Orthopedics       Family History   Problem Relation Age of Onset    Cancer Mother     Alzheimer's disease Father        Social History     Occupational History    Not on file  Social History Main Topics    Smoking status: Never Smoker    Smokeless tobacco: Never Used    Alcohol use Yes      Comment: social    Drug use: No    Sexual activity: Not on file         Current Outpatient Prescriptions:     Ascorbic Acid ([employer] VITAMIN C) 500 MG CHEW, Chew 500 mg daily  , Disp: , Rfl:     calcium acetate (PHOSLO) 667 mg capsule, Take 1,334 mg by mouth 3 (three) times a day with meals, Disp: , Rfl:     cholecalciferol (VITAMIN D3) 1,000 units tablet, Take 1,000 Units by mouth daily, Disp: , Rfl:     ibuprofen (MOTRIN) 600 mg tablet, Take 1 tablet (600 mg total) by mouth every 6 (six) hours as needed for mild pain, Disp: 30 tablet, Rfl: 0    Multiple Vitamin (DAILY VITAMINS PO), Take by mouth daily  , Disp: , Rfl:     potassium chloride (K-DUR,KLOR-CON) 20 mEq tablet, Take one tab po bid x 3 days, Disp: 6 tablet, Rfl: 0    No Known Allergies    Objective:  Vitals:    12/18/18 0811   BP: 144/83   Pulse: 85       Ortho Exam     Right Wrist     70 supination  Full pronation  45 extension  20 flexion  NTTP fracture site  Incision well healed  Full composite fist  Full extension  Sensation intact median, radial, and ulnar nerve  Compartments soft  Brisk capillary refill     Physical Exam   Constitutional: She is oriented to person, place, and time  She appears well-developed and well-nourished  HENT:   Head: Normocephalic and atraumatic  Eyes: Conjunctivae are normal  Right eye exhibits no discharge  Left eye exhibits no discharge  Neck: Normal range of motion  Neck supple  Cardiovascular: Normal rate and intact distal pulses      Pulmonary/Chest: Effort normal  No respiratory distress  Musculoskeletal:   As noted in HPI   Neurological: She is alert and oriented to person, place, and time  Skin: Skin is warm and dry  Psychiatric: She has a normal mood and affect  Her behavior is normal  Judgment and thought content normal        I have personally reviewed pertinent films in PACS and my interpretation is as follows:X-ray right wrist performed in the office today demonstrates orthopedic hardware intact

## 2018-12-19 ENCOUNTER — EVALUATION (OUTPATIENT)
Dept: OCCUPATIONAL THERAPY | Facility: CLINIC | Age: 58
End: 2018-12-19
Payer: COMMERCIAL

## 2018-12-19 DIAGNOSIS — S52.601D TRAUMATIC CLOSED DISPLACED FRACTURE OF DISTAL END OF RIGHT RADIUS AND ULNA, WITH ROUTINE HEALING, SUBSEQUENT ENCOUNTER: Primary | ICD-10-CM

## 2018-12-19 DIAGNOSIS — S52.501D TRAUMATIC CLOSED DISPLACED FRACTURE OF DISTAL END OF RIGHT RADIUS AND ULNA, WITH ROUTINE HEALING, SUBSEQUENT ENCOUNTER: Primary | ICD-10-CM

## 2018-12-19 PROCEDURE — G8984 CARRY CURRENT STATUS: HCPCS | Performed by: OCCUPATIONAL THERAPIST

## 2018-12-19 PROCEDURE — 97165 OT EVAL LOW COMPLEX 30 MIN: CPT | Performed by: OCCUPATIONAL THERAPIST

## 2018-12-19 PROCEDURE — G8985 CARRY GOAL STATUS: HCPCS | Performed by: OCCUPATIONAL THERAPIST

## 2018-12-19 NOTE — PROGRESS NOTES
OT Evaluation     Today's date: 2018  Patient name: Josi Cerrato  : 1960  MRN: [id number]  Referring provider: Ankur Land DO  Dx:   Encounter Diagnosis     ICD-10-CM    1  Traumatic closed displaced fracture of distal end of right radius and ulna, with routine healing, subsequent encounter S52 501D Ambulatory referral to PT/OT hand therapy    S52 604I        Start Time: 0230  Stop Time: 0315  Total time in clinic (min): 45 minutes    Assessment  Assessment details: CASE SUMMARY:   Josi Cerrato is a 63y o  year old female who suffered a fracture of her ulna and radius in a fall, on 18  She underwent ORIF to both radius and ulna on 18  PMHx includes: See chart for full details with medications  She is   Some of her interests include: walking, play computer games, takes care of 10 month old granddaughter  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  Job duties include driving the [employer], 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, help with toileting, and computer work  FUNCTIONAL SUMMARY:   Josi Cerrato is independent in basic self care skills  Her  assists with her bra, and some dressing tasks  She has difficulty with lifting, cooking, cleaning and work tasks  FOTO score is 30% with a 70% limit in function  IMPAIRMENTS:  Josi Cerrato presents with:  Healed incision with no adhesion,   Increased edema,   Decreased ROM in the wrist,   Decreased wrist and hand strength,   Intact sensation,  Pain level at 0-3/10   Difficulty with ADLs and work tasks  Patient would benefit from Occupational Therapy to address these impairments in order to return to her prior level of function  Understanding of Dx/Px/POC: good   Prognosis: good    Goals  Short Term Goals:   to be completed in 6-8 weeks     1  Improve scar mobility through scar massage, Graston techniques and /or kinesiotape ,   2  Decrease edema of wrist through manual lymphatic drainage massage, tubigrip stockinette, and /or kinesiotape ,   3  Increase ROM of R wrist to WNLs, through the use of heat modalities, manual therapy with Graston techniques, PROM, joint mobilizations, AROM exercises, flexion taping  as needed  4   Increase R wrist to 5/5 and hand strength right =50% of unaffected side  5  Decrease pain to 0-3/10  Long Term Goals: To be completed in 8-10 weeks  1  Ability to perform lifting, carrying, cooking,cleaning tasks and work tasks with minimal to no discomfort,   2  Patient demonstrates good carryover of HEP,   3  Minimal to no pain complaints  0-2/10,   4  Full ROM of R wrist  5  Improved wrist strength to 5/5 and hand strength  right =75% of unaffected side  Plan  Patient would benefit from: skilled occupational therapy  Planned modality interventions: thermotherapy: hydrocollator packs  Planned therapy interventions: joint mobilization, manual therapy, massage, therapeutic activities, therapeutic exercise, stretching, strengthening, flexibility, functional ROM exercises and home exercise program  Frequency: 2x week  Duration in weeks: 10  Plan of Care beginning date: 2018  Plan of Care expiration date: 2019        Subjective Evaluation    Pain  Current pain ratin  At best pain ratin  At worst pain rating: 3  Location: In R Wrist   Quality: dull ache    Hand dominance: right    Patient Goals  Patient goals for therapy: decreased edema, decreased pain, increased motion, increased strength, independence with ADLs/IADLs and return to work          Objective     Observations     Additional Observation Details  Incisions healed with no adhesions  Neurological Testing     Sensation     Wrist/Hand     Right   Intact: light touch    Additional Neurological Details  No numbness or tingling  Active Range of Motion     Right Elbow   Forearm supination: 45 degrees   Forearm pronation: 90 degrees     Additional Active Range of Motion Details  Fingers are WNLs  Thumb opposition to D5 PIP joint  Passive Range of Motion     Right Wrist   Wrist flexion: 20 degrees   Wrist extension: 35 degrees   Radial deviation: 5 degrees   Ulnar deviation: 20 degrees     Strength/Myotome Testing     Left Elbow   Forearm supination: WFL    Left Wrist/Hand      (2nd hand position)     Trial 1: 70    Thumb Strength  Key/Lateral Pinch     Lula 1: 7  Palmar/Three-Point Pinch     Trial 1: 8    Right Wrist/Hand      (2nd hand position)     Trial 1: 10    Trial 2: 12    Thumb Strength   Key/Lateral Pinch     Trial 1: 5  Palmar/Three-Point Pinch     Trial 1: 1    Additional Strength Details  Incisions healed with no adhesions  Swelling     Left Wrist/Hand   Circumference wrist: 16 cm    Right Wrist/Hand   Circumference wrist: 19 cm      Flowsheet Rows      Most Recent Value   PT/OT G-Codes   Current Score  30   Projected Score  58   FOTO information reviewed  Yes   Assessment Type  Evaluation   G code set  Carrying, Moving & Handling Objects   Carrying, Moving and Handling Objects Current Status ()  CL   Carrying, Moving and Handling Objects Goal Status ()  CK          Subjective: Patient reports pain level as 0-3/10 today  Objective: Completed initial evaluation  See report for details  Completed Hot pack for warm up, manual therapy, exercises and activities to increase ROM, strength, coordination and function  See Treatment Diary below  Instructed in Wrist ROM, and to use sponge for gripping, pinching, and twirling  Home Program:See Media Section for details  Wrist ROM ex  Yellow sponge for gripping, pinching, twirling  Precautions: s/p ORIF to radius and ulna 11/14/18    Assessment: Patient tolerated session well  Plan: Continue Occupational Therapy ~2x/week to decrease pain and edema and improve ROM, strength and function

## 2018-12-27 ENCOUNTER — OFFICE VISIT (OUTPATIENT)
Dept: OCCUPATIONAL THERAPY | Facility: CLINIC | Age: 58
End: 2018-12-27
Payer: COMMERCIAL

## 2018-12-27 DIAGNOSIS — S52.601D TRAUMATIC CLOSED DISPLACED FRACTURE OF DISTAL END OF RIGHT RADIUS AND ULNA, WITH ROUTINE HEALING, SUBSEQUENT ENCOUNTER: Primary | ICD-10-CM

## 2018-12-27 DIAGNOSIS — S52.501D TRAUMATIC CLOSED DISPLACED FRACTURE OF DISTAL END OF RIGHT RADIUS AND ULNA, WITH ROUTINE HEALING, SUBSEQUENT ENCOUNTER: Primary | ICD-10-CM

## 2018-12-27 PROCEDURE — 97110 THERAPEUTIC EXERCISES: CPT

## 2018-12-27 PROCEDURE — 97140 MANUAL THERAPY 1/> REGIONS: CPT

## 2018-12-27 NOTE — PROGRESS NOTES
Daily Note     Today's date: 2018  Patient name: Valentin Crandall  : 1960  MRN: [id number]  Referring provider: Sierra Vaughan DO  Dx:   Encounter Diagnosis     ICD-10-CM    1  Traumatic closed displaced fracture of distal end of right radius and ulna, with routine healing, subsequent encounter S52 501D     S52 601D                   Subjective: 0/10      Objective: See treatment diary below  Precautions :  ORIF    HEP:  Gripper 2 bands    Specialty Daily Treatment Diary     Manual  18       graston Scars        Myofascial with soft tissue work Wrist with gentle traction                                   Exercise Diary  18       Wrist maze X 6       Hand gym 2 x 10       Clothes pegs Easy 2 x 15   D 4+5  Firm 2 x 20   D 2 +3       Wrist weight Flex 1lb x 12  Ext 1lb x 12  Dev 1lb x 12       Pronation/supination Hammer 2 x 12       Gripper HEP 2 bands x 12                                                                                                                           Modalities 18                                       Assessment: Tolerated treatment well  Good supination noted  Patient would benefit from continued OT  Minor sensory issue at incision site at times      Plan: Continue per plan of care

## 2019-01-02 ENCOUNTER — OFFICE VISIT (OUTPATIENT)
Dept: OCCUPATIONAL THERAPY | Facility: CLINIC | Age: 59
End: 2019-01-02
Payer: COMMERCIAL

## 2019-01-02 DIAGNOSIS — S52.601D TRAUMATIC CLOSED DISPLACED FRACTURE OF DISTAL END OF RIGHT RADIUS AND ULNA, WITH ROUTINE HEALING, SUBSEQUENT ENCOUNTER: Primary | ICD-10-CM

## 2019-01-02 DIAGNOSIS — S52.501D TRAUMATIC CLOSED DISPLACED FRACTURE OF DISTAL END OF RIGHT RADIUS AND ULNA, WITH ROUTINE HEALING, SUBSEQUENT ENCOUNTER: Primary | ICD-10-CM

## 2019-01-02 PROCEDURE — 97110 THERAPEUTIC EXERCISES: CPT | Performed by: OCCUPATIONAL THERAPIST

## 2019-01-02 PROCEDURE — 97140 MANUAL THERAPY 1/> REGIONS: CPT | Performed by: OCCUPATIONAL THERAPIST

## 2019-01-02 NOTE — PROGRESS NOTES
Daily Note     Today's date: 2019  Patient name: Chela Tom  : 1960  MRN: [id number]  Referring provider: Jere Lesches, DO  Dx:   Encounter Diagnosis     ICD-10-CM    1  Traumatic closed displaced fracture of distal end of right radius and ulna, with routine healing, subsequent encounter S52 501D     S52 601D        Start Time: 1100  Stop Time: 1140  Total time in clinic (min): 40 minutes    Subjective: Patient reports pain level as 3/10 today  At radial wrist  Feels more swelled  Objective:   Completed Hot pack for warm up, manual therapy, exercises and activities to increase ROM, strength, coordination and function  See Treatment Diary below  Reviewed grippers and re-adjusted elastics to a little easier  Gave extra tubigrip D to have one to wash and wear  Home Program:See Media Section for details  Wrist ROM ex  Yellow sponge for gripping, pinching, twirling  Green grippers Set at 2 red elastics    Precautions: s/p ORIF to radius and ulna 18    Specialty Daily Treatment Diary     Manual  19       Myofascial soft tissue work and graston techniques To R wrist and forearm musculature       Joint mobs Ant/post/ ulnar/radial   Pronation /supination  Individual carpal glides  Traction and stretching grade 2-3       Kinesiotape To thumb and around wrist                            Exercise Diary  19       Wrist Maze        Hand Gym        Wrist weights Flexion  Ext  Radial Dev  Ulnar Dev  1lb x20       Grippers Light x15        Clothespegs Firm D1,2, 3  Light D2,3,4                                                                                                                                   Modalities 19       Double Hot pack  X 5 min to wrist and forearm  Assessment: Patient tolerated session well  ROM improving and tolerating wrist weights well  Developed some pain after likely overdoing grippers   She will take it easy and try the kinesiotape  Plan: Continue Occupational Therapy ~2x/week to decrease pain and edema and improve ROM, strength and function

## 2019-01-04 ENCOUNTER — OFFICE VISIT (OUTPATIENT)
Dept: OCCUPATIONAL THERAPY | Facility: CLINIC | Age: 59
End: 2019-01-04
Payer: COMMERCIAL

## 2019-01-04 DIAGNOSIS — S52.601D TRAUMATIC CLOSED DISPLACED FRACTURE OF DISTAL END OF RIGHT RADIUS AND ULNA, WITH ROUTINE HEALING, SUBSEQUENT ENCOUNTER: Primary | ICD-10-CM

## 2019-01-04 DIAGNOSIS — S52.501D TRAUMATIC CLOSED DISPLACED FRACTURE OF DISTAL END OF RIGHT RADIUS AND ULNA, WITH ROUTINE HEALING, SUBSEQUENT ENCOUNTER: Primary | ICD-10-CM

## 2019-01-04 PROCEDURE — 97110 THERAPEUTIC EXERCISES: CPT | Performed by: OCCUPATIONAL THERAPIST

## 2019-01-04 PROCEDURE — 97140 MANUAL THERAPY 1/> REGIONS: CPT | Performed by: OCCUPATIONAL THERAPIST

## 2019-01-04 NOTE — PROGRESS NOTES
Daily Note     Today's date: 2019  Patient name: Mckayla Gong  : 1960  MRN: [id number]  Referring provider: Marva Brown DO  Dx:   Encounter Diagnosis     ICD-10-CM    1  Traumatic closed displaced fracture of distal end of right radius and ulna, with routine healing, subsequent encounter S52 501D     S52 601D        Start Time: 1100  Stop Time: 1145  Total time in clinic (min): 45 minutes    Subjective: Patient reports pain level as 1/10 today  Feeling much better than last day, just a little hypersensitive at radial wrist      Objective:   Completed Hot pack for warm up, manual therapy, exercises and activities to increase ROM, strength, coordination and function  See Treatment Diary below  Home Program:See Media Section for details  Wrist ROM ex  Yellow sponge for gripping, pinching, twirling     Green grippers Set at 2 red elastics    Precautions: s/p ORIF to radius and ulna 18    Specialty Daily Treatment Diary     Manual  19      Myofascial soft tissue work and graston techniques To R wrist and forearm musculature To R wrist and forearm musculature      Joint mobs Ant/post/ ulnar/radial   Pronation /supination  Individual carpal glides  Traction and stretching grade 2-3 Ant/post/ ulnar/radial   Pronation /supination  Individual carpal glides  Traction and stretching grade 2-3      Kinesiotape To thumb and around wrist  To thumb and around wrist                           Exercise Diary  19      Wrist Maze  x5      Hand Gym  x20      Wrist weights Flexion  Ext  Radial Dev  Ulnar Dev  1lb x20 Flexion  Ext  Radial Dev  Ulnar Dev  1lb x20      Grippers Light x15  3Y x20      Clothespegs Firm D1,2, 3  Light D2,3,4 Firm D1,2, 3  Light D2,3,4      Pron/ supination   With hammer x20      Putty   Putty with gripping, pinching, kneading, Jar turns x20  Key turns x 15 Modalities 1/02/19 1/4/19      Double Hot pack  X 5 min to wrist and forearm  X 5 min to wrist and forearm                              Assessment: Patient tolerated session well  ROM improving and tolerating wrist weights well  Pain she had last day is pretty much resolved  She was taped again today just to make sure she doesn't have Dequervain's pain  Plan: Continue Occupational Therapy ~2x/week to decrease pain and edema and improve ROM, strength and function

## 2019-01-08 ENCOUNTER — TELEPHONE (OUTPATIENT)
Dept: OBGYN CLINIC | Facility: CLINIC | Age: 59
End: 2019-01-08

## 2019-01-08 ENCOUNTER — OFFICE VISIT (OUTPATIENT)
Dept: OCCUPATIONAL THERAPY | Facility: CLINIC | Age: 59
End: 2019-01-08
Payer: COMMERCIAL

## 2019-01-08 DIAGNOSIS — S52.601D TRAUMATIC CLOSED DISPLACED FRACTURE OF DISTAL END OF RIGHT RADIUS AND ULNA, WITH ROUTINE HEALING, SUBSEQUENT ENCOUNTER: Primary | ICD-10-CM

## 2019-01-08 DIAGNOSIS — S52.501D TRAUMATIC CLOSED DISPLACED FRACTURE OF DISTAL END OF RIGHT RADIUS AND ULNA, WITH ROUTINE HEALING, SUBSEQUENT ENCOUNTER: Primary | ICD-10-CM

## 2019-01-08 PROCEDURE — 97110 THERAPEUTIC EXERCISES: CPT | Performed by: OCCUPATIONAL THERAPIST

## 2019-01-08 PROCEDURE — 97140 MANUAL THERAPY 1/> REGIONS: CPT | Performed by: OCCUPATIONAL THERAPIST

## 2019-01-08 PROCEDURE — 97530 THERAPEUTIC ACTIVITIES: CPT | Performed by: OCCUPATIONAL THERAPIST

## 2019-01-08 NOTE — PROGRESS NOTES
Daily Note     Today's date: 2019  Patient name: Rebeca Steele  : 1960  MRN: [id number]  Referring provider: Robet Jeans, DO  Dx:   Encounter Diagnosis     ICD-10-CM    1  Traumatic closed displaced fracture of distal end of right radius and ulna, with routine healing, subsequent encounter S52 501D     S52 601D        Start Time: 0845  Stop Time: 0930  Total time in clinic (min): 45 minutes    Subjective: Patient reports pain level as 1/10 today  Feeling much better than last day, less  hypersensitive at radial wrist      Objective:   Completed Hot pack for warm up, manual therapy, exercises and activities to increase ROM, strength, coordination and function  See Treatment Diary below  Home Program:See Media Section for details  Wrist ROM ex  Yellow sponge for gripping, pinching, twirling     Green grippers Set at 2 red elastics    Precautions: s/p ORIF to radius and ulna 18    Specialty Daily Treatment Diary     Manual  19     Myofascial soft tissue work and graston techniques To R wrist and forearm musculature To R wrist and forearm musculature To R wrist and forearm musculature     Joint mobs Ant/post/ ulnar/radial   Pronation /supination  Individual carpal glides  Traction and stretching grade 2-3 Ant/post/ ulnar/radial   Pronation /supination  Individual carpal glides  Traction and stretching grade 2-3 Ant/post/ ulnar/radial   Pronation /supination  Individual carpal glides  Traction and stretching grade 2-3     Kinesiotape To thumb and around wrist  To thumb and around wrist                           Exercise Diary  19     Wrist Maze  x5 x5     Hand Gym  x20 x20     Wrist weights Flexion  Ext  Radial Dev  Ulnar Dev  1lb x20 Flexion  Ext  Radial Dev  Ulnar Dev  1lb x20 Flexion  Ext  Radial Dev  Ulnar Dev  2lb x20     Grippers Light x15  3Y x20 3Y x20     Clothespegs Firm D1,2, 3  Light D2,3,4 Firm D1,2, 3  Light D2,3,4 Firm D1,2, 3  Light D2,3,4 Pron/ supination   With hammer x20 With powerbar x20     Putty   Putty with gripping, pinching, kneading, Jar turns x20  Key turns x 15 Putty with gripping, pinching, kneading, Jar turns x20  Key turns x 15                                                                                                                 Modalities 1/02/19 1/4/19 1/8/19     Double Hot pack  X 5 min to wrist and forearm  X 5 min to wrist and forearm X 5 min to wrist and forearm                             Assessment: Patient tolerated session well  ROM improving and tolerating wrist weights well  Pain she had last day is resolved  Plan: Continue Occupational Therapy ~2x/week to decrease pain and edema and improve ROM, strength and function

## 2019-01-10 ENCOUNTER — OFFICE VISIT (OUTPATIENT)
Dept: OCCUPATIONAL THERAPY | Facility: CLINIC | Age: 59
End: 2019-01-10
Payer: COMMERCIAL

## 2019-01-10 DIAGNOSIS — S52.601D TRAUMATIC CLOSED DISPLACED FRACTURE OF DISTAL END OF RIGHT RADIUS AND ULNA, WITH ROUTINE HEALING, SUBSEQUENT ENCOUNTER: Primary | ICD-10-CM

## 2019-01-10 DIAGNOSIS — S52.501D TRAUMATIC CLOSED DISPLACED FRACTURE OF DISTAL END OF RIGHT RADIUS AND ULNA, WITH ROUTINE HEALING, SUBSEQUENT ENCOUNTER: Primary | ICD-10-CM

## 2019-01-10 PROCEDURE — 97110 THERAPEUTIC EXERCISES: CPT | Performed by: OCCUPATIONAL THERAPIST

## 2019-01-10 PROCEDURE — 97140 MANUAL THERAPY 1/> REGIONS: CPT | Performed by: OCCUPATIONAL THERAPIST

## 2019-01-10 NOTE — PROGRESS NOTES
Daily Note     Today's date: 1/10/2019  Patient name: Amy Kirby  : 1960  MRN: [id number]  Referring provider: Mejia Raya DO  Dx:   Encounter Diagnosis     ICD-10-CM    1  Traumatic closed displaced fracture of distal end of right radius and ulna, with routine healing, subsequent encounter S52 501D     S52 601D        Start Time: 0845  Stop Time: 0930  Total time in clinic (min): 45 minutes    Subjective: Patient reports pain level as 1/10 today  Can do 3lb wts at home  Objective:   Completed Hot pack for warm up, manual therapy, exercises and activities to increase ROM, strength, coordination and function  See Treatment Diary below  Upgraded grippers to 3 elastics  Home Program:See Media Section for details  Wrist ROM ex  Yellow sponge for gripping, pinching, twirling     Green grippers Set at 3 red elastics    Precautions: s/p ORIF to radius and ulna 18    Specialty Daily Treatment Diary     Manual  1/2/19 1/4/19 1/8/19 1/10/19    Myofascial soft tissue work and graston techniques To R wrist and forearm musculature To R wrist and forearm musculature To R wrist and forearm musculature To R wrist and forearm musculature    Joint mobs Ant/post/ ulnar/radial   Pronation /supination  Individual carpal glides  Traction and stretching grade 2-3 Ant/post/ ulnar/radial   Pronation /supination  Individual carpal glides  Traction and stretching grade 2-3 Ant/post/ ulnar/radial   Pronation /supination  Individual carpal glides  Traction and stretching grade 2-3 Ant/post/ ulnar/radial   Pronation /supination  Individual carpal glides  Traction and stretching grade 2-3    Kinesiotape To thumb and around wrist  To thumb and around wrist                           Exercise Diary  1/2/19 1/4/19 1/8/19 1/10/19    Wrist Maze  x5 x5 x5    Hand Gym  x20 x20 x20    Wrist weights Flexion  Ext  Radial Dev  Ulnar Dev  1lb x20 Flexion  Ext  Radial Dev  Ulnar Dev  1lb x20 Flexion  Ext  Radial Dev  Ulnar Dev  2lb x20 Flexion  Ext  Radial Dev  Ulnar Dev  3lb x20    Grippers Light x15  3Y x20 3Y x20 Light 3 elastics    Clothespegs Firm D1,2, 3  Light D2,3,4 Firm D1,2, 3  Light D2,3,4 Firm D1,2, 3  Light D2,3,4     Pron/ supination   With hammer x20 With powerbar x20 With powerbar  x20    Putty   Putty with gripping, pinching, kneading, Jar turns x20  Key turns x 15 Putty with gripping, pinching, kneading, Jar turns x20  Key turns x 15 Putty with gripping, pinching, kneading, Jar turns x20  Key turns x 15                                                                                                                Modalities 1/02/19 1/4/19 1/8/19 1/10/19    Double Hot pack  X 5 min to wrist and forearm  X 5 min to wrist and forearm X 5 min to wrist and forearm X 5 min to wrist and forearm                            Assessment: Patient tolerated session well  ROM improving and tolerating wrist weights well  We will re-eval for Dr visit next day  Plan: Continue Occupational Therapy ~2x/week to decrease pain and edema and improve ROM, strength and function

## 2019-01-14 ENCOUNTER — OFFICE VISIT (OUTPATIENT)
Dept: OCCUPATIONAL THERAPY | Facility: CLINIC | Age: 59
End: 2019-01-14
Payer: COMMERCIAL

## 2019-01-14 DIAGNOSIS — S52.601D TRAUMATIC CLOSED DISPLACED FRACTURE OF DISTAL END OF RIGHT RADIUS AND ULNA, WITH ROUTINE HEALING, SUBSEQUENT ENCOUNTER: Primary | ICD-10-CM

## 2019-01-14 DIAGNOSIS — S52.501D TRAUMATIC CLOSED DISPLACED FRACTURE OF DISTAL END OF RIGHT RADIUS AND ULNA, WITH ROUTINE HEALING, SUBSEQUENT ENCOUNTER: Primary | ICD-10-CM

## 2019-01-14 PROCEDURE — G8984 CARRY CURRENT STATUS: HCPCS | Performed by: OCCUPATIONAL THERAPIST

## 2019-01-14 PROCEDURE — 97140 MANUAL THERAPY 1/> REGIONS: CPT | Performed by: OCCUPATIONAL THERAPIST

## 2019-01-14 PROCEDURE — G8985 CARRY GOAL STATUS: HCPCS | Performed by: OCCUPATIONAL THERAPIST

## 2019-01-14 PROCEDURE — 97110 THERAPEUTIC EXERCISES: CPT | Performed by: OCCUPATIONAL THERAPIST

## 2019-01-14 NOTE — PROGRESS NOTES
OT Evaluation     Today's date: 2019  Patient name: Mckayla Gong  : 1960  MRN: [id number]  Referring provider: Marva Brown DO  Dx:   Encounter Diagnosis     ICD-10-CM    1  Traumatic closed displaced fracture of distal end of right radius and ulna, with routine healing, subsequent encounter S52 501D     S52 601D        Start Time: 1100  Stop Time: 1205  Total time in clinic (min): 65 minutes    Assessment  Assessment details: CASE SUMMARY:   Mckayla Gong is a 63y o  year old female who suffered a fracture of her ulna and radius in a fall, on 18  She underwent ORIF to both radius and ulna on 18  PMHx includes: See chart for full details with medications  She is   Some of her interests include: walking, play computer games, takes care of 10 month old granddaughter  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 [employer], 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, help with toileting, and computer work  PROGRESS UPDATE: 19  Mckayla Gong was initially seen in our department on 19 and has since been seen ~2x/week  Treatment includes moist heat, manual therapy with myofacial soft tissue work and Graston techniques, PROM and joint mobs, therapeutic exercises to increase ROM and strength  She is showing good improvement in ROM and strength  Pain level is decreasing to 0-1/10  She is able to do more ADLs and cleaning tasks  However, she could benefit from a few more weeks of therapy to increase range a little further and her strength for return to work  FUNCTIONAL SUMMARY:   Mckayla Gong is independent in basic self care skills  She is has difficulty with lifting, cooking, cleaning and work tasks  FOTO score is 50% with a 50% limit in function       IMPAIRMENTS:  Mckayla Gong presents with:  Healed incision with no adhesion,   Increased edema,   Decreased ROM in the wrist,   Decreased wrist and hand strength,   Intact sensation,  Pain level at 0-3/10 Difficulty with ADLs and work tasks  Patient would benefit from Occupational Therapy to address these impairments in order to return to her prior level of function  Understanding of Dx/Px/POC: good   Prognosis: good    Goals  Short Term Goals:   to be completed in 6-8 weeks  1  Improve scar mobility through scar massage, Graston techniques and /or kinesiotape , Met  2  Decrease edema of wrist through manual lymphatic drainage massage, tubigrip stockinette, and /or kinesiotape ,  Partially Met  3  Increase ROM of R wrist to WNLs, through the use of heat modalities, manual therapy with Graston techniques, PROM, joint mobilizations, AROM exercises, flexion taping  as needed  Partially Met  4  Increase R wrist to 5/5 and hand strength right =50% of unaffected side  Partially Met  5  Decrease pain to 0-3/10  Met    Long Term Goals: To be completed in 8-10 weeks  1  Ability to perform lifting, carrying, cooking,cleaning tasks and work tasks with minimal to no discomfort, Partially Met  2  Patient demonstrates good carryover of HEP, Met  3  Minimal to no pain complaints  0-2/10, Met  4  Full ROM of R wrist Partially Met  5  Improved wrist strength to 5/5 and hand strength  right =75% of unaffected side  Partially Met      Plan  Patient would benefit from: skilled occupational therapy  Planned modality interventions: thermotherapy: hydrocollator packs  Planned therapy interventions: joint mobilization, manual therapy, massage, therapeutic activities, therapeutic exercise, stretching, strengthening, flexibility, functional ROM exercises and home exercise program  Frequency: 2x week  Duration in weeks: 10  Plan of Care beginning date: 2018  Plan of Care expiration date: 2019        Subjective Evaluation    Pain  Current pain ratin  At best pain ratin  At worst pain ratin  Location:  In R Wrist   Quality: dull ache    Hand dominance: right    Patient Goals  Patient goals for therapy: decreased edema, decreased pain, increased motion, increased strength, independence with ADLs/IADLs and return to work          Objective     Observations     Additional Observation Details  Incisions healed with no adhesions  Thumb CMC joint is a little abducted and is tight to lay flat  Neurological Testing     Sensation     Wrist/Hand     Right   Intact: light touch    Additional Neurological Details  No numbness or tingling  Occasionally feels a burning pain on the radial wrist     Active Range of Motion     Right Elbow   Forearm supination: 75 degrees   Forearm pronation: 90 degrees     Additional Active Range of Motion Details  Fingers are WNLs  Thumb opposition to D5 base  Passive Range of Motion     Right Wrist   Wrist flexion: 55 degrees   Wrist extension: 60 degrees   Radial deviation: 15 degrees   Ulnar deviation: 25 degrees     Strength/Myotome Testing     Left Elbow   Forearm supination: WFL    Left Wrist/Hand      (2nd hand position)     Trial 1: 60    Thumb Strength  Key/Lateral Pinch     Trail 1: 9  Tip/Two-Point Pinch     Trial 1: 5  Palmar/Three-Point Pinch     Trial 1: 9    Right Wrist/Hand   Wrist extension: 4+  Wrist flexion: 4+     (2nd hand position)     Trial 1: 25    Thumb Strength   Key/Lateral Pinch     Trial 1: 7  Tip/Two-Point Pinch     Trial 1: 5  Palmar/Three-Point Pinch     Trial 1: 5    Additional Strength Details  Incisions healed with no adhesions  Thumb CMC joint is a little abducted and is tight to lay flat  Swelling     Left Wrist/Hand   Circumference wrist: 16 cm    Right Wrist/Hand   Circumference wrist: 17 5 cm      Flowsheet Rows      Most Recent Value   PT/OT G-Codes   Current Score  50   Projected Score  58   Assessment Type  Re-evaluation   G code set  Carrying, Moving & Handling Objects   Carrying, Moving and Handling Objects Current Status ()  CK   Carrying, Moving and Handling Objects Goal Status ()  CK          Treatment Today     Subjective: Patient reports pain level as 1/10 today  Can do 3lb wts at home  Objective:   Completed Hot pack for warm up, manual therapy, exercises and activities to increase ROM, strength, coordination and function  See Treatment Diary below  Upgraded grippers to 3 elastics  Home Program:See Media Section for details  Wrist ROM ex  Yellow sponge for gripping, pinching, twirling     Green grippers Set at 3 red elastics    Precautions: s/p ORIF to radius and ulna 11/14/18    Specialty Daily Treatment Diary     Manual  1/2/19 1/4/19 1/8/19 1/10/19 1/14/19   Myofascial soft tissue work and graston techniques To R wrist and forearm musculature To R wrist and forearm musculature To R wrist and forearm musculature To R wrist and forearm musculature To R wrist and forearm musculature   Joint mobs Ant/post/ ulnar/radial   Pronation /supination  Individual carpal glides  Traction and stretching grade 2-3 Ant/post/ ulnar/radial   Pronation /supination  Individual carpal glides  Traction and stretching grade 2-3 Ant/post/ ulnar/radial   Pronation /supination  Individual carpal glides  Traction and stretching grade 2-3 Ant/post/ ulnar/radial   Pronation /supination  Individual carpal glides  Traction and stretching grade 2-3 Ant/post/ ulnar/radial   Pronation /supination  Individual carpal glides  Traction and stretching grade 2-3   Kinesiotape To thumb and around wrist  To thumb and around wrist                           Exercise Diary  1/2/19 1/4/19 1/8/19 1/10/19 1/14/19   Wrist Maze  x5 x5 x5 x5   Hand Gym  x20 x20 x20 x20   Wrist weights Flexion  Ext  Radial Dev  Ulnar Dev  1lb x20 Flexion  Ext  Radial Dev  Ulnar Dev  1lb x20 Flexion  Ext  Radial Dev  Ulnar Dev  2lb x20 Flexion  Ext  Radial Dev  Ulnar Dev  3lb x20 Flexion  Ext  Radial Dev  Ulnar Dev  3lb x20   Grippers Light x15  3Y x20 3Y x20 Light 3 elastics 3Y x 20   Clothespegs Firm D1,2, 3  Light D2,3,4 Firm D1,2, 3  Light D2,3,4 Firm D1,2, 3  Light D2,3,4  Firm D1,2, 3  Light D2,3,4   Pron/ supination   With hammer x20 With powerbar x20 With powerbar  x20 With powerbar x 20   Putty   Putty with gripping, pinching, kneading, Jar turns x20  Key turns x 15 Putty with gripping, pinching, kneading, Jar turns x20  Key turns x 15 Putty with gripping, pinching, kneading, Jar turns x20  Key turns x 15 Putty with gripping, pinching, kneading, Jar turns x20  Key turns x 15                                                                                                               Modalities 1/02/19 1/4/19 1/8/19 1/10/19 1/14/19   Double Hot pack  X 5 min to wrist and forearm  X 5 min to wrist and forearm X 5 min to wrist and forearm X 5 min to wrist and forearm X 5 min to wrist and forearm                           Assessment: Patient tolerated session well  ROM improving but still has a small lag in flexion and extension  Strength increasing but she is still quite weak for the job she does  with disabled adults  Plan: Continue Occupational Therapy ~2x/week to decrease pain and edema and improve ROM, strength and function

## 2019-01-15 ENCOUNTER — OFFICE VISIT (OUTPATIENT)
Dept: OBGYN CLINIC | Facility: CLINIC | Age: 59
End: 2019-01-15

## 2019-01-15 ENCOUNTER — APPOINTMENT (OUTPATIENT)
Dept: RADIOLOGY | Facility: CLINIC | Age: 59
End: 2019-01-15
Payer: COMMERCIAL

## 2019-01-15 VITALS
HEIGHT: 67 IN | HEART RATE: 82 BPM | BODY MASS INDEX: 31.32 KG/M2 | DIASTOLIC BLOOD PRESSURE: 85 MMHG | SYSTOLIC BLOOD PRESSURE: 138 MMHG

## 2019-01-15 DIAGNOSIS — S52.602D TRAUMATIC CLOSED DISPLACED FRACTURE OF DISTAL END OF LEFT RADIUS AND ULNA WITH ROUTINE HEALING: Primary | ICD-10-CM

## 2019-01-15 DIAGNOSIS — S52.502D TRAUMATIC CLOSED DISPLACED FRACTURE OF DISTAL END OF LEFT RADIUS AND ULNA WITH ROUTINE HEALING: Primary | ICD-10-CM

## 2019-01-15 DIAGNOSIS — S52.502D TRAUMATIC CLOSED DISPLACED FRACTURE OF DISTAL END OF LEFT RADIUS AND ULNA WITH ROUTINE HEALING: ICD-10-CM

## 2019-01-15 DIAGNOSIS — S52.602D TRAUMATIC CLOSED DISPLACED FRACTURE OF DISTAL END OF LEFT RADIUS AND ULNA WITH ROUTINE HEALING: ICD-10-CM

## 2019-01-15 PROCEDURE — 73100 X-RAY EXAM OF WRIST: CPT

## 2019-01-15 PROCEDURE — 99024 POSTOP FOLLOW-UP VISIT: CPT | Performed by: ORTHOPAEDIC SURGERY

## 2019-01-15 NOTE — PROGRESS NOTES
Assessment/Plan:  1  Traumatic closed displaced fracture of distal end of left radius and ulna with routine healing  XR wrist 2 vw right       Scribe Attestation    I,:   Shalini Reeder MA am acting as a scribe while in the presence of the attending physician :        I,:   Valery Welch DO personally performed the services described in this documentation    as scribed in my presence :              Lee Cantrell is doing well  She has good range-of-motion on examination today  She was instructed to continue with physical therapy  She may return to work without restrictions and a note was provided for this today  She may be weightbearing as tolerated and has no restrictions at this time  She may discontinue brace use  She is aware that the swelling may take a few months to completely resolve  She will follow up in 4 weeks for repeat evaluation  Subjective: Antonio Valdovinos is a 63 y o  female who presents to the office 9 weeks s/p ORIF right wrist performed on 11/14/18  She states she has been compliant with physical therapy  She denies any numbness or tingling  She does note some swelling to the wrist and will occassionally take Tylenol as needed for pain  She does note some stiffness to the right thumb  Review of Systems   Constitutional: Negative for chills and fever  HENT: Negative for drooling and sneezing  Eyes: Negative for redness  Respiratory: Negative for cough and wheezing  Gastrointestinal: Negative for nausea and vomiting  Musculoskeletal: Positive for arthralgias, joint swelling and myalgias  Neurological: Negative for weakness and numbness  Psychiatric/Behavioral: Negative for behavioral problems  The patient is not nervous/anxious            Past Medical History:   Diagnosis Date    Anxiety     Borderline hyperlipidemia     Cancer ([address] )     Basal Cell skin ca    Ovarian cyst     Uterine fibroid     Vertigo        Past Surgical History:   Procedure Laterality Date    HERNIA REPAIR  2011    ORIF WRIST FRACTURE Right 11/14/2018    Procedure: OPEN REDUCTION W/ INTERNAL FIXATION (ORIF) RADIUS / ULNA (WRIST); Surgeon: Valery Welch DO;  Location: [facility];  Service: Orthopedics       Family History   Problem Relation Age of Onset    Cancer Mother     Alzheimer's disease Father     No Known Problems Sister     No Known Problems Brother     No Known Problems Maternal Aunt     No Known Problems Maternal Uncle     No Known Problems Paternal Aunt     No Known Problems Paternal Uncle     No Known Problems Maternal Grandmother     No Known Problems Maternal Grandfather     No Known Problems Paternal Grandmother     No Known Problems Paternal Grandfather     ADD / ADHD Neg Hx     Anesthesia problems Neg Hx     Clotting disorder Neg Hx     Collagen disease Neg Hx     Diabetes Neg Hx     Dislocations Neg Hx     Learning disabilities Neg Hx     Neurological problems Neg Hx     Osteoporosis Neg Hx     Rheumatologic disease Neg Hx     Scoliosis Neg Hx     Vascular Disease Neg Hx        Social History     Occupational History    Not on file       Social History Main Topics    Smoking status: Never Smoker    Smokeless tobacco: Never Used    Alcohol use Yes      Comment: social    Drug use: No    Sexual activity: Not on file         Current Outpatient Prescriptions:     Ascorbic Acid ([employer] VITAMIN C) 500 MG CHEW, Chew 500 mg daily  , Disp: , Rfl:     calcium acetate (PHOSLO) 667 mg capsule, Take 1,334 mg by mouth 3 (three) times a day with meals, Disp: , Rfl:     cholecalciferol (VITAMIN D3) 1,000 units tablet, Take 1,000 Units by mouth daily, Disp: , Rfl:     ibuprofen (MOTRIN) 600 mg tablet, Take 1 tablet (600 mg total) by mouth every 6 (six) hours as needed for mild pain, Disp: 30 tablet, Rfl: 0    Multiple Vitamin (DAILY VITAMINS PO), Take by mouth daily  , Disp: , Rfl:     potassium chloride (K-DUR,KLOR-CON) 20 mEq tablet, Take one tab po bid x 3 days, Disp: 6 tablet, Rfl: 0    No Known Allergies    Objective:  Vitals:    01/15/19 0809   BP: 138/85   Pulse: 82       Ortho Exam     Right wrist      Flex 50  Ext 60  10 radial and ulnar deviation  Full pronation  5/5 ulnar deviator and extensors   EPL intact   Incision well healed  Sensation intact median, radial, and ulnar nerve  Compartments soft  Brisk capillary refill   strength left 50 and right 30      Physical Exam   Constitutional: She is oriented to person, place, and time  She appears well-developed and well-nourished  HENT:   Head: Normocephalic and atraumatic  Eyes: Conjunctivae are normal  Right eye exhibits no discharge  Left eye exhibits no discharge  Neck: Normal range of motion  Neck supple  Cardiovascular: Normal rate and intact distal pulses  Pulmonary/Chest: Effort normal  No respiratory distress  Neurological: She is alert and oriented to person, place, and time  Skin: Skin is warm and dry  Psychiatric: She has a normal mood and affect  Her behavior is normal  Judgment and thought content normal        I have personally reviewed pertinent films in PACS and my interpretation is as follows:X-ray right wrist performed in the office today demonstrates orthopedic hardware intact

## 2019-01-15 NOTE — LETTER
January 15, 2019     Patient: Aida Frances   YOB: 1960   Date of Visit: 1/15/2019       To Whom it May Concern: Jonathanaditi Espitia is under my professional care  She was seen in my office on 1/15/2019  She may return to work full duty without any restrictions  If you have any questions or concerns, please don't hesitate to call           Sincerely,          Daisha Castro DO        CC: No Recipients

## 2019-01-15 NOTE — LETTER
January 15, 2019     Patient: Sona Jewell   YOB: 1960   Date of Visit: 1/15/2019       To Whom it May Concern: Luke Boone is under my professional care  She was seen in my office on 1/15/2019  She may return to work on 1/22/19 full duty without any restrictions  If you have any questions or concerns, please don't hesitate to call           Sincerely,          Nam Jeong DO        CC: No Recipients

## 2019-01-16 ENCOUNTER — OFFICE VISIT (OUTPATIENT)
Dept: OCCUPATIONAL THERAPY | Facility: CLINIC | Age: 59
End: 2019-01-16
Payer: COMMERCIAL

## 2019-01-16 DIAGNOSIS — S52.601D TRAUMATIC CLOSED DISPLACED FRACTURE OF DISTAL END OF RIGHT RADIUS AND ULNA, WITH ROUTINE HEALING, SUBSEQUENT ENCOUNTER: Primary | ICD-10-CM

## 2019-01-16 DIAGNOSIS — S52.501D TRAUMATIC CLOSED DISPLACED FRACTURE OF DISTAL END OF RIGHT RADIUS AND ULNA, WITH ROUTINE HEALING, SUBSEQUENT ENCOUNTER: Primary | ICD-10-CM

## 2019-01-16 PROCEDURE — 97140 MANUAL THERAPY 1/> REGIONS: CPT | Performed by: OCCUPATIONAL THERAPIST

## 2019-01-16 PROCEDURE — 97530 THERAPEUTIC ACTIVITIES: CPT | Performed by: OCCUPATIONAL THERAPIST

## 2019-01-16 PROCEDURE — 97110 THERAPEUTIC EXERCISES: CPT | Performed by: OCCUPATIONAL THERAPIST

## 2019-01-16 NOTE — PROGRESS NOTES
Daily Note     Today's date: 2019  Patient name: Mouna Wright  : 1960  MRN: [id number]  Referring provider: Chey Tuttle DO  Dx:   Encounter Diagnosis     ICD-10-CM    1  Traumatic closed displaced fracture of distal end of right radius and ulna, with routine healing, subsequent encounter S52 501D     S52 601D        Start Time: 0830  Stop Time: 0915  Total time in clinic (min): 45 minutes    Subjective: Patient reports pain level as 1/10 today  Can do 3lb wts at home  Objective:   Completed Hot pack for warm up, manual therapy, exercises and activities to increase ROM, strength, coordination and function  See Treatment Diary below  Home Program:See Media Section for details  Wrist ROM ex  Yellow sponge for gripping, pinching, twirling  Green grippers Set at 3 red elastics    Precautions: s/p ORIF to radius and ulna 18    Specialty Daily Treatment Diary     Manual  1/16/19 1/4/19 1/8/19 1/10/19 1/14/19   Myofascial soft tissue work and graston techniques To R wrist and forearm musculature To R wrist and forearm musculature To R wrist and forearm musculature To R wrist and forearm musculature To R wrist and forearm musculature   Joint mobs Ant/post/ ulnar/radial   Pronation /supination  Individual carpal glides  Traction and stretching grade 2-3 Ant/post/ ulnar/radial   Pronation /supination  Individual carpal glides  Traction and stretching grade 2-3 Ant/post/ ulnar/radial   Pronation /supination  Individual carpal glides  Traction and stretching grade 2-3 Ant/post/ ulnar/radial   Pronation /supination  Individual carpal glides  Traction and stretching grade 2-3 Ant/post/ ulnar/radial   Pronation /supination  Individual carpal glides  Traction and stretching grade 2-3   Kinesiotape To scars   To thumb and around wrist                           Exercise Diary  1/16/19 1/4/19 1/8/19 1/10/19 1/14/19   Wrist Maze x5 x5 x5 x5 x5   Hand Gym x20 x20 x20 x20 x20   Wrist weights Flexion  Ext  Radial Dev  Ulnar Dev  1lb x20 Flexion  Ext  Radial Dev  Ulnar Dev  1lb x20 Flexion  Ext  Radial Dev  Ulnar Dev  2lb x20 Flexion  Ext  Radial Dev  Ulnar Dev  3lb x20 Flexion  Ext  Radial Dev  Ulnar Dev  3lb x20   Grippers 3Y x20 3Y x20 3Y x20 Light 3 elastics 3Y x 20   Clothespegs Firm D1,2, 3  Light D2,3,4 Firm D1,2, 3  Light D2,3,4 Firm D1,2, 3  Light D2,3,4  Firm D1,2, 3  Light D2,3,4   Pron/ supination  With powerbar x20 With hammer x20 With powerbar x20 With powerbar  x20 With powerbar x 20   Putty  Putty with gripping, pinching, kneading, Jar turns x20  Key turns x 15 Putty with gripping, pinching, kneading, Jar turns x20  Key turns x 15 Putty with gripping, pinching, kneading, Jar turns x20  Key turns x 15 Putty with gripping, pinching, kneading, Jar turns x20  Key turns x 15 Putty with gripping, pinching, kneading, Jar turns x20  Key turns x 15   Finger Ex Red and blue x20       Flex Bar Red 1min  Williams bend                                                                                                    Modalities 1/16/19 1/4/19 1/8/19 1/10/19 1/14/19   Double Hot pack  X 5 min to wrist and forearm  X 5 min to wrist and forearm X 5 min to wrist and forearm X 5 min to wrist and forearm X 5 min to wrist and forearm                       Assessment: Patient tolerated session well  ROM improving in flexion today  Needs more work on strength, so we will continue therapy for another few weeks  Patient has now returned to work  Plan: Continue Occupational Therapy ~2x/week to decrease pain and edema and improve ROM, strength and function

## 2019-01-18 ENCOUNTER — APPOINTMENT (OUTPATIENT)
Dept: OCCUPATIONAL THERAPY | Facility: CLINIC | Age: 59
End: 2019-01-18
Payer: COMMERCIAL

## 2019-01-21 ENCOUNTER — APPOINTMENT (OUTPATIENT)
Dept: OCCUPATIONAL THERAPY | Facility: CLINIC | Age: 59
End: 2019-01-21
Payer: COMMERCIAL

## 2019-01-22 ENCOUNTER — APPOINTMENT (OUTPATIENT)
Dept: OCCUPATIONAL THERAPY | Facility: CLINIC | Age: 59
End: 2019-01-22
Payer: COMMERCIAL

## 2019-01-23 ENCOUNTER — OFFICE VISIT (OUTPATIENT)
Dept: OCCUPATIONAL THERAPY | Facility: CLINIC | Age: 59
End: 2019-01-23
Payer: COMMERCIAL

## 2019-01-23 DIAGNOSIS — S52.501D TRAUMATIC CLOSED DISPLACED FRACTURE OF DISTAL END OF RIGHT RADIUS AND ULNA, WITH ROUTINE HEALING, SUBSEQUENT ENCOUNTER: Primary | ICD-10-CM

## 2019-01-23 DIAGNOSIS — S52.601D TRAUMATIC CLOSED DISPLACED FRACTURE OF DISTAL END OF RIGHT RADIUS AND ULNA, WITH ROUTINE HEALING, SUBSEQUENT ENCOUNTER: Primary | ICD-10-CM

## 2019-01-23 PROCEDURE — 97140 MANUAL THERAPY 1/> REGIONS: CPT | Performed by: OCCUPATIONAL THERAPIST

## 2019-01-23 PROCEDURE — 97110 THERAPEUTIC EXERCISES: CPT | Performed by: OCCUPATIONAL THERAPIST

## 2019-01-23 PROCEDURE — 97530 THERAPEUTIC ACTIVITIES: CPT | Performed by: OCCUPATIONAL THERAPIST

## 2019-01-23 NOTE — PROGRESS NOTES
Daily Note     Today's date: 2019  Patient name: Leandro Landry  : 1960  MRN: [id number]  Referring provider: Sharon Granger DO  Dx:   Encounter Diagnosis     ICD-10-CM    1  Traumatic closed displaced fracture of distal end of right radius and ulna, with routine healing, subsequent encounter S52 501D     S52 601D        Start Time: 1635  Stop Time: 1720  Total time in clinic (min): 45 minutes    Subjective: Patient reports pain level as 1/10 today  Objective:   Completed Hot pack for warm up, manual therapy, exercises and activities to increase ROM, strength, coordination and function  See Treatment Diary below  Instructed in theraband ex- gave green band    Home Program:See Media Section for details  Wrist ROM ex  Yellow sponge for gripping, pinching, twirling  Green grippers Set at 3 red elastics  Theraband Ex - gave green band  Precautions: s/p ORIF to radius and ulna 18    Specialty Daily Treatment Diary     Manual  1/16/19 1/23/19 1/8/19 1/10/19 1/14/19   Myofascial soft tissue work and graston techniques To R wrist and forearm musculature To R wrist and forearm musculature To R wrist and forearm musculature To R wrist and forearm musculature To R wrist and forearm musculature   Joint mobs Ant/post/ ulnar/radial   Pronation /supination  Individual carpal glides  Traction and stretching grade 2-3 Ant/post/ ulnar/radial   Pronation /supination  Individual carpal glides  Traction and stretching grade 2-3 Ant/post/ ulnar/radial   Pronation /supination  Individual carpal glides  Traction and stretching grade 2-3 Ant/post/ ulnar/radial   Pronation /supination  Individual carpal glides  Traction and stretching grade 2-3 Ant/post/ ulnar/radial   Pronation /supination  Individual carpal glides  Traction and stretching grade 2-3   Kinesiotape To scars   To thumb and around wrist                           Exercise Diary  1/16/19 1/23/19 1/8/19 1/10/19 1/14/19   Wrist Maze x5 x5 x5 x5 x5   Hand Gym x20 x20 x20 x20 x20   Wrist weights Flexion  Ext  Radial Dev  Ulnar Dev  3lb x20 Flexion  Ext  Radial Dev  Ulnar Dev  4lb x20 Flexion  Ext  Radial Dev  Ulnar Dev  2lb x20 Flexion  Ext  Radial Dev  Ulnar Dev  3lb x20 Flexion  Ext  Radial Dev  Ulnar Dev  3lb x20   Grippers 3Y x20 3Y x20 3Y x20 Light 3 elastics 3Y x 20   Clothespegs Firm D1,2, 3  Light D2,3,4 Firm D1,2, 3  Light D2,3,4 Firm D1,2, 3  Light D2,3,4  Firm D1,2, 3  Light D2,3,4   Pron/ supination  With powerbar x20 With hammer x20 With powerbar x20 With powerbar  x20 With powerbar x 20   Putty  Putty with gripping, pinching, kneading, Jar turns x20  Key turns x 15 Putty with gripping, pinching, kneading, Jar turns x20  Key turns x 15 Putty with gripping, pinching, kneading, Jar turns x20  Key turns x 15 Putty with gripping, pinching, kneading, Jar turns x20  Key turns x 15 Putty with gripping, pinching, kneading, Jar turns x20  Key turns x 15   Finger Ex Red and blue x20       Flex Bar Red 1min  Orange bend  Red 1 min for stabilization  Orange bar bends x15              Wall pulleys    Triceps Pull (4)  IR down(3) Protraction (3)   ER down (2)  Biceps Curl (2)  x15                                                                                  Modalities 1/16/19 1/23/19 1/8/19 1/10/19 1/14/19   Double Hot pack  X 5 min to wrist and forearm  X 5 min to wrist and forearm X 5 min to wrist and forearm X 5 min to wrist and forearm X 5 min to wrist and forearm                       Assessment: Patient tolerated session well  ROM improving in flexion today  Needs more work on strength, so we will continue therapy for another few weeks  Patient tolerated upgrades to program and will start using theraband ex  Patient has now returned to work  She managed okay  Plan: Continue Occupational Therapy ~2x/week to decrease pain and edema and improve ROM, strength and function

## 2019-01-25 ENCOUNTER — APPOINTMENT (OUTPATIENT)
Dept: OCCUPATIONAL THERAPY | Facility: CLINIC | Age: 59
End: 2019-01-25
Payer: COMMERCIAL

## 2019-01-28 ENCOUNTER — OFFICE VISIT (OUTPATIENT)
Dept: OCCUPATIONAL THERAPY | Facility: CLINIC | Age: 59
End: 2019-01-28
Payer: COMMERCIAL

## 2019-01-28 DIAGNOSIS — S52.501D TRAUMATIC CLOSED DISPLACED FRACTURE OF DISTAL END OF RIGHT RADIUS AND ULNA, WITH ROUTINE HEALING, SUBSEQUENT ENCOUNTER: Primary | ICD-10-CM

## 2019-01-28 DIAGNOSIS — S52.601D TRAUMATIC CLOSED DISPLACED FRACTURE OF DISTAL END OF RIGHT RADIUS AND ULNA, WITH ROUTINE HEALING, SUBSEQUENT ENCOUNTER: Primary | ICD-10-CM

## 2019-01-28 PROCEDURE — 97140 MANUAL THERAPY 1/> REGIONS: CPT | Performed by: OCCUPATIONAL THERAPIST

## 2019-01-28 PROCEDURE — 97530 THERAPEUTIC ACTIVITIES: CPT | Performed by: OCCUPATIONAL THERAPIST

## 2019-01-28 PROCEDURE — 97110 THERAPEUTIC EXERCISES: CPT | Performed by: OCCUPATIONAL THERAPIST

## 2019-01-28 NOTE — PROGRESS NOTES
Daily Note     Today's date: 2019  Patient name: Ronnie Merrill  : 1960  MRN: [id number]  Referring provider: Kyle Trevino DO  Dx:   Encounter Diagnosis     ICD-10-CM    1  Traumatic closed displaced fracture of distal end of right radius and ulna, with routine healing, subsequent encounter S52 501D     S52 601D        Start Time: 1620  Stop Time: 1715  Total time in clinic (min): 55 minutes    Subjective: Patient reports pain level as 10 today  Objective:   Completed Hot pack for warm up, manual therapy, exercises and activities to increase ROM, strength, coordination and function  See Treatment Diary below  Instructed in theraband ex- gave green band    Home Program:See Media Section for details  Wrist ROM ex  Yellow sponge for gripping, pinching, twirling  Green grippers Set at 3 red elastics  Theraband Ex - gave green band  Precautions: s/p ORIF to radius and ulna 18    Specialty Daily Treatment Diary     Manual  1/16/19 1/23/19 1/28/19 1/10/19 1/14/19   Myofascial soft tissue work and graston techniques To R wrist and forearm musculature To R wrist and forearm musculature To R wrist and forearm musculature To R wrist and forearm musculature To R wrist and forearm musculature   Joint mobs Ant/post/ ulnar/radial   Pronation /supination  Individual carpal glides  Traction and stretching grade 2-3 Ant/post/ ulnar/radial   Pronation /supination  Individual carpal glides  Traction and stretching grade 2-3 Ant/post/ ulnar/radial   Pronation /supination  Individual carpal glides  Traction and stretching grade 2-3 Ant/post/ ulnar/radial   Pronation /supination  Individual carpal glides  Traction and stretching grade 2-3 Ant/post/ ulnar/radial   Pronation /supination  Individual carpal glides  Traction and stretching grade 2-3   Kinesiotape To scars   To thumb and around wrist                           Exercise Diary  1/16/19 1/23/19 1/28/19 1/10/19 1/14/19   Wrist Maze x5 x5 x5 x5 x5 Hand Gym x20 x20 x20 x20 x20   Wrist weights Flexion  Ext  Radial Dev  Ulnar Dev  3lb x20 Flexion  Ext  Radial Dev  Ulnar Dev  4lb x20  Flexion  Ext  Radial Dev  Ulnar Dev  3lb x20 Flexion  Ext  Radial Dev  Ulnar Dev  3lb x20   Grippers 3Y x20 3Y x20 3Y x25 Light 3 elastics 3Y x 20   Clothespegs Firm D1,2, 3  Light D2,3,4 Firm D1,2, 3  Light D2,3,4 Firm D1,2, 3  Light D2,3,4  Firm D1,2, 3  Light D2,3,4   Pron/ supination  With powerbar x20 With hammer x20 With powerbar x20 With powerbar  x20 With powerbar x 20   Putty  Putty with gripping, pinching, kneading, Jar turns x20  Key turns x 15 Putty with gripping, pinching, kneading, Jar turns x20  Key turns x 15 Putty with gripping, pinching, kneading, Jar turns x20  Key turns x 20 Putty with gripping, pinching, kneading, Jar turns x20  Key turns x 15 Putty with gripping, pinching, kneading, Jar turns x20  Key turns x 15   Finger Ex Red and blue x20       Flex Bar Red 1min  Orange bend  Red 1 min for stabilization  Orange bar bends x15 Red 1 min for stabilization  Orange bar bends x15     Wall pulleys    Triceps Pull (4)  IR down(3) Protraction (3)   ER down (2)  Biceps Curl (2)  x15 Triceps Pull (4)  IR down(4) Protraction (4)   ER down (4)  Biceps Curl (3)  x15                                                                                 Modalities 1/16/19 1/23/19 1/28/19 1/10/19 1/14/19   Double Hot pack  X 5 min to wrist and forearm  X 5 min to wrist and forearm X 5 min to wrist and forearm X 5 min to wrist and forearm X 5 min to wrist and forearm                       Assessment: Patient tolerated session well  The wall pulleys were hard so we took it easy on the wrist weights today  Plan: Continue Occupational Therapy ~2x/week to decrease pain and edema and improve ROM, strength and function

## 2019-01-30 ENCOUNTER — OFFICE VISIT (OUTPATIENT)
Dept: OCCUPATIONAL THERAPY | Facility: CLINIC | Age: 59
End: 2019-01-30
Payer: COMMERCIAL

## 2019-01-30 DIAGNOSIS — S52.501D TRAUMATIC CLOSED DISPLACED FRACTURE OF DISTAL END OF RIGHT RADIUS AND ULNA, WITH ROUTINE HEALING, SUBSEQUENT ENCOUNTER: Primary | ICD-10-CM

## 2019-01-30 DIAGNOSIS — S52.601D TRAUMATIC CLOSED DISPLACED FRACTURE OF DISTAL END OF RIGHT RADIUS AND ULNA, WITH ROUTINE HEALING, SUBSEQUENT ENCOUNTER: Primary | ICD-10-CM

## 2019-01-30 PROCEDURE — 97110 THERAPEUTIC EXERCISES: CPT | Performed by: OCCUPATIONAL THERAPIST

## 2019-01-30 PROCEDURE — 97140 MANUAL THERAPY 1/> REGIONS: CPT | Performed by: OCCUPATIONAL THERAPIST

## 2019-01-30 NOTE — PROGRESS NOTES
Daily Note     Today's date: 2019  Patient name: Sona Jewell  : 1960  MRN: [id number]  Referring provider: Angie Johnson DO  Dx:   Encounter Diagnosis     ICD-10-CM    1  Traumatic closed displaced fracture of distal end of right radius and ulna, with routine healing, subsequent encounter S52 501D     S52 601D        Start Time: 0445  Stop Time: 0530  Total time in clinic (min): 45 minutes    Subjective: Patient reports pain level as 1/10 today  Objective:   Completed Hot pack for warm up, manual therapy, exercises and activities to increase ROM, strength, coordination and function  See Treatment Diary below  Instructed in theraband ex- gave green band    Home Program:See Media Section for details  Wrist ROM ex  Yellow sponge for gripping, pinching, twirling  Green grippers Set at 3 red elastics  Theraband Ex - gave green band  Precautions: s/p ORIF to radius and ulna 18    Specialty Daily Treatment Diary     Manual  19   Myofascial soft tissue work and graston techniques To R wrist and forearm musculature To R wrist and forearm musculature To R wrist and forearm musculature To R wrist and forearm musculature To R wrist and forearm musculature   Joint mobs Ant/post/ ulnar/radial   Pronation /supination  Individual carpal glides  Traction and stretching grade 2-3 Ant/post/ ulnar/radial   Pronation /supination  Individual carpal glides  Traction and stretching grade 2-3 Ant/post/ ulnar/radial   Pronation /supination  Individual carpal glides  Traction and stretching grade 2-3 Ant/post/ ulnar/radial   Pronation /supination  Individual carpal glides  Traction and stretching grade 2-3 Ant/post/ ulnar/radial   Pronation /supination  Individual carpal glides  Traction and stretching grade 2-3   Kinesiotape To scars   To thumb and around wrist                           Exercise Diary  19   Wrist Maze x5 x5 x5 x5 x5 Hand Gym x20 x20 x20 x20 x20   Wrist weights Flexion  Ext  Radial Dev  Ulnar Dev  3lb x20 Flexion  Ext  Radial Dev  Ulnar Dev  4lb x20  Flexion  Ext  Radial Dev  Ulnar Dev  4lb x20 Flexion  Ext  Radial Dev  Ulnar Dev  3lb x20   Grippers 3Y x20 3Y x20 3Y x25 3Y x25 3Y x 20   Clothespegs Firm D1,2, 3  Light D2,3,4 Firm D1,2, 3  Light D2,3,4 Firm D1,2, 3  Light D2,3,4 Light D2,3,4 Firm D1,2, 3  Light D2,3,4   Pron/ supination  With powerbar x20 With hammer x20 With powerbar x20 With powerbar  x25 With powerbar x 20   Putty  Putty with gripping, pinching, kneading, Jar turns x20  Key turns x 15 Putty with gripping, pinching, kneading, Jar turns x20  Key turns x 15 Putty with gripping, pinching, kneading, Jar turns x20  Key turns x 20 Putty with gripping, pinching, kneading, Jar turns x20  Key turns x 20 Putty with gripping, pinching, kneading, Jar turns x20  Key turns x 15   Finger Ex Red and blue x20       Flex Bar Red 1min  Orange bend  Red 1 min for stabilization  Orange bar bends x15 Red 1 min for stabilization  Orange bar bends x15 Red 1 min for stabilization  Orange bar bends x15    Wall pulleys    Triceps Pull (4)  IR down(3) Protraction (3)   ER down (2)  Biceps Curl (2)  x15 Triceps Pull (4)  IR down(4) Protraction (4)   ER down (4)  Biceps Curl (3)  x15 Triceps Pull (5)  IR down(4) Protraction (4)   ER down (3)  Biceps Curl (3)  x15                                                                                Modalities 1/16/19 1/23/19 1/28/19 1/30/19 1/14/19   Double Hot pack  X 5 min to wrist and forearm  X 5 min to wrist and forearm X 5 min to wrist and forearm X 5 min to wrist and forearm X 5 min to wrist and forearm                       Assessment: Patient tolerated session well continued to increase activities  Plan: Continue Occupational Therapy ~2x/week to decrease pain and edema and improve ROM, strength and function

## 2019-02-04 ENCOUNTER — OFFICE VISIT (OUTPATIENT)
Dept: OCCUPATIONAL THERAPY | Facility: CLINIC | Age: 59
End: 2019-02-04
Payer: COMMERCIAL

## 2019-02-04 DIAGNOSIS — S52.501D TRAUMATIC CLOSED DISPLACED FRACTURE OF DISTAL END OF RIGHT RADIUS AND ULNA, WITH ROUTINE HEALING, SUBSEQUENT ENCOUNTER: Primary | ICD-10-CM

## 2019-02-04 DIAGNOSIS — S52.601D TRAUMATIC CLOSED DISPLACED FRACTURE OF DISTAL END OF RIGHT RADIUS AND ULNA, WITH ROUTINE HEALING, SUBSEQUENT ENCOUNTER: Primary | ICD-10-CM

## 2019-02-04 PROCEDURE — 97140 MANUAL THERAPY 1/> REGIONS: CPT | Performed by: OCCUPATIONAL THERAPIST

## 2019-02-04 PROCEDURE — 97530 THERAPEUTIC ACTIVITIES: CPT | Performed by: OCCUPATIONAL THERAPIST

## 2019-02-04 NOTE — PROGRESS NOTES
Daily Note     Today's date: 2019  Patient name: Steph Harding  : 1960  MRN: [id number]  Referring provider: Yusef Oden DO  Dx:   Encounter Diagnosis     ICD-10-CM    1  Traumatic closed displaced fracture of distal end of right radius and ulna, with routine healing, subsequent encounter S52 501D     S52 601D        Start Time: 0345  Stop Time: 0430  Total time in clinic (min): 45 minutes    Subjective: Patient reports pain level as 1/10 today  Objective:   Completed Hot pack for warm up, manual therapy, exercises and activities to increase ROM, strength, coordination and function  See Treatment Diary below  Home Program:See Media Section for details  Wrist ROM ex  Yellow sponge for gripping, pinching, twirling  Green grippers Set at 3 red elastics  Theraband Ex - gave green band  Precautions: s/p ORIF to radius and ulna 18    Specialty Daily Treatment Diary     Manual  19   Myofascial soft tissue work and graston techniques To R wrist and forearm musculature To R wrist and forearm musculature To R wrist and forearm musculature To R wrist and forearm musculature To R wrist and forearm musculature   Joint mobs Ant/post/ ulnar/radial   Pronation /supination  Individual carpal glides  Traction and stretching grade 2-3 Ant/post/ ulnar/radial   Pronation /supination  Individual carpal glides  Traction and stretching grade 2-3 Ant/post/ ulnar/radial   Pronation /supination  Individual carpal glides  Traction and stretching grade 2-3 Ant/post/ ulnar/radial   Pronation /supination  Individual carpal glides  Traction and stretching grade 2-3 Ant/post/ ulnar/radial   Pronation /supination  Individual carpal glides  Traction and stretching grade 2-3   Kinesiotape To scars   To thumb and around wrist                           Exercise Diary  19   Wrist Maze x5 x5 x5 x5 x5   Hand Gym x20 x20 x20 x20 x20   Wrist weights Flexion  Ext  Radial Dev  Ulnar Dev  3lb x20 Flexion  Ext  Radial Dev  Ulnar Dev  4lb x20  Flexion  Ext  Radial Dev  Ulnar Dev  4lb x20 Flexion  Ext  Radial Dev  Ulnar Dev  4lb x20   Grippers 3Y x20 3Y x20 3Y x25 3Y x25 3Y x 20   Clothespegs Firm D1,2, 3  Light D2,3,4 Firm D1,2, 3  Light D2,3,4 Firm D1,2, 3  Light D2,3,4 Light D2,3,4 Firm D1,2, 3  Light D2,3,4   Pron/ supination  With powerbar x20 With hammer x20 With powerbar x20 With powerbar  x25 With powerbar x 20   Putty  Putty with gripping, pinching, kneading, Jar turns x20  Key turns x 15 Putty with gripping, pinching, kneading, Jar turns x20  Key turns x 15 Putty with gripping, pinching, kneading, Jar turns x20  Key turns x 20 Putty with gripping, pinching, kneading, Jar turns x20  Key turns x 20 Putty with gripping, pinching, kneading, Jar turns x20  Key turns x 15   Finger Ex Red and blue x20    Red and blue x20   Flex Bar Red 1min  Orange bend  Red 1 min for stabilization  Orange bar bends x15 Red 1 min for stabilization  Orange bar bends x15 Red 1 min for stabilization  Orange bar bends x15 Red 1 min for stabilization  [place] bar bends x15   Wall pulleys    Triceps Pull (4)  IR down(3) Protraction (3)   ER down (2)  Biceps Curl (2)  x15 Triceps Pull (4)  IR down(4) Protraction (4)   ER down (4)  Biceps Curl (3)  x15 Triceps Pull (5)  IR down(4) Protraction (4)   ER down (3)  Biceps Curl (3)  x15 Triceps Pull (4)  IR down(4) Protraction (4)   ER down (4)  Biceps Curl (3)  x15                                                                               Modalities 1/16/19 1/23/19 1/28/19 1/30/19 2/4/19   Double Hot pack  X 5 min to wrist and forearm  X 5 min to wrist and forearm X 5 min to wrist and forearm X 5 min to wrist and forearm X 5 min to wrist and forearm                       Assessment: Patient tolerated session well continued to increase activities  Strength improving       Plan: Continue Occupational Therapy ~2x/week to decrease pain and edema and improve ROM, strength and function

## 2019-02-06 ENCOUNTER — OFFICE VISIT (OUTPATIENT)
Dept: OCCUPATIONAL THERAPY | Facility: CLINIC | Age: 59
End: 2019-02-06
Payer: COMMERCIAL

## 2019-02-06 DIAGNOSIS — S52.601D TRAUMATIC CLOSED DISPLACED FRACTURE OF DISTAL END OF RIGHT RADIUS AND ULNA, WITH ROUTINE HEALING, SUBSEQUENT ENCOUNTER: Primary | ICD-10-CM

## 2019-02-06 DIAGNOSIS — S52.501D TRAUMATIC CLOSED DISPLACED FRACTURE OF DISTAL END OF RIGHT RADIUS AND ULNA, WITH ROUTINE HEALING, SUBSEQUENT ENCOUNTER: Primary | ICD-10-CM

## 2019-02-06 PROCEDURE — G8985 CARRY GOAL STATUS: HCPCS | Performed by: OCCUPATIONAL THERAPIST

## 2019-02-06 PROCEDURE — 97110 THERAPEUTIC EXERCISES: CPT | Performed by: OCCUPATIONAL THERAPIST

## 2019-02-06 PROCEDURE — G8984 CARRY CURRENT STATUS: HCPCS | Performed by: OCCUPATIONAL THERAPIST

## 2019-02-06 PROCEDURE — 97140 MANUAL THERAPY 1/> REGIONS: CPT | Performed by: OCCUPATIONAL THERAPIST

## 2019-02-06 NOTE — PROGRESS NOTES
Daily Note     Today's date: 2019  Patient name: Mary Wilkinson  : 1960  MRN: [id number]  Referring provider: Fernando Neely DO  Dx:   Encounter Diagnosis     ICD-10-CM    1  Traumatic closed displaced fracture of distal end of right radius and ulna, with routine healing, subsequent encounter S52 501D     S52 601D        Start Time: 445  Stop Time: 0530  Total time in clinic (min): 45 minutes    Assessment  Assessment details: CASE SUMMARY:   Mary Wilkinson is a 63y o  year old female who suffered a fracture of her ulna and radius in a fall, on 18  She underwent ORIF to both radius and ulna on 18  PMHx includes: See chart for full details with medications  She is   Some of her interests include: walking, play computer games, takes care of 10 month old granddaughter  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 [employer], 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, help with toileting, and computer work  PROGRESS UPDATE: 19  Mary Wilkinson was initially seen in our department on 19 and has since been seen ~2x/week  Treatment includes moist heat, manual therapy with myofacial soft tissue work and Graston techniques, PROM and joint mobs, therapeutic exercises to increase ROM and strength  She is showing good improvement in ROM and strength  Pain level is decreasing to 0-2/10  Today she was a little more sore on the dorsal wrist possibly from overdoing the past few days  She is able to do more ADLs and cleaning tasks  Work tasks are generally going okay  Strength is improving on the R, though she is still a little decreased  I told her to discuss continuing therapy with you or just move to home program      FUNCTIONAL SUMMARY:   Mary Wilkinson is independent in basic self care skills  She is has difficulty with lifting, cooking, cleaning and work tasks  FOTO score is 74% with a 26% limit in function       IMPAIRMENTS:  Mary Wilkinson presents with:  Healed incision with no adhesion,   Increased edema,   Improved ROM in the wrist,   Improved but still a little decreased wrist and hand strength,   Intact sensation,  Pain level at 0-2/10   Difficulty with ADLs and work tasks  Patient would benefit from Occupational Therapy to address these impairments in order to return to her prior level of function  Understanding of Dx/Px/POC: good   Prognosis: good    Goals  Short Term Goals:   to be completed in 6-8 weeks  1  Improve scar mobility through scar massage, Graston techniques and /or kinesiotape , Met  2  Decrease edema of wrist through manual lymphatic drainage massage, tubigrip stockinette, and /or kinesiotape ,  Partially Met  3  Increase ROM of R wrist to WNLs, through the use of heat modalities, manual therapy with Graston techniques, PROM, joint mobilizations, AROM exercises, flexion taping  as needed  Nearly Met  4  Increase R wrist to 5/5 and hand strength right =50% of unaffected side  Met  5  Decrease pain to 0-3/10  Met    Long Term Goals: To be completed in 8-10 weeks  1  Ability to perform lifting, carrying, cooking,cleaning tasks and work tasks with minimal to no discomfort, Met  2  Patient demonstrates good carryover of HEP, Met  3  Minimal to no pain complaints  0-2/10, Met  4  Full ROM of R wrist Nearly Met  5  Improved wrist strength to 5/5 and hand strength  right =75% of unaffected side    Met      Plan  Patient would benefit from: skilled occupational therapy  Planned modality interventions: thermotherapy: hydrocollator packs  Planned therapy interventions: joint mobilization, manual therapy, massage, therapeutic activities, therapeutic exercise, stretching, strengthening, flexibility, functional ROM exercises and home exercise program  Frequency: 2x week  Duration in weeks: 10  Plan of Care beginning date: 2018  Plan of Care expiration date: 2019        Subjective Evaluation    Pain  Current pain ratin  At best pain ratin  At worst pain ratin  Location: In R Wrist  Today was hurting more at dorsal wrist, possibly from overdoing it the last couple days  Quality: dull ache    Hand dominance: right    Patient Goals  Patient goals for therapy: decreased edema, decreased pain, increased motion, increased strength, independence with ADLs/IADLs and return to work          Objective     Observations     Additional Observation Details  Incisions healed with no adhesions  Thumb CMC joint is a little abducted and is tight to lay flat  Neurological Testing     Sensation     Wrist/Hand     Right   Intact: light touch    Additional Neurological Details  No numbness or tingling  No longer feels a burning pain on the radial wrist     Active Range of Motion     Right Elbow   Forearm supination: 80 degrees   Forearm pronation: 90 degrees     Additional Active Range of Motion Details  Fingers are WNLs  Thumb opposition to D5 base  Passive Range of Motion     Right Wrist   Wrist flexion: 55 degrees   Wrist extension: 60 degrees   Radial deviation: 25 degrees   Ulnar deviation: 35 degrees     Strength/Myotome Testing     Left Elbow   Forearm supination: WFL    Left Wrist/Hand      (2nd hand position)     Trial 1: 65    Thumb Strength  Key/Lateral Pinch     Trail 1: 10  Tip/Two-Point Pinch     Trial 1: 8  Palmar/Three-Point Pinch     Trial 1: 9    Right Wrist/Hand   Wrist extension: 4+  Wrist flexion: 4+     (2nd hand position)     Trial 1: 50    Thumb Strength   Key/Lateral Pinch     Trial 1: 10  Tip/Two-Point Pinch     Trial 1: 8  Palmar/Three-Point Pinch     Trial 1: 10    Additional Strength Details  Incisions healed with no adhesions  Thumb CMC joint is a little abducted and is tight to lay flat       Swelling     Left Wrist/Hand   Circumference wrist: 16 cm    Right Wrist/Hand   Circumference wrist: 17 cm (improved )    Flowsheet Rows      Most Recent Value   PT/OT G-Codes   Current Score  74   Projected Score  58   Assessment Type Re-evaluation   G code set  Carrying, Moving & Handling Objects   Carrying, Moving and Handling Objects Current Status ()  CK   Carrying, Moving and Handling Objects Goal Status ()  CK          Treatment Today  Subjective: Patient reports pain level as 1/10 today  Objective:   Completed Hot pack for warm up, manual therapy, exercises and activities to increase ROM, strength, coordination and function  See Treatment Diary below  Assisted by Dottie Robledo OTR for manual therapy  Home Program:See Media Section for details  Wrist ROM ex  Yellow sponge for gripping, pinching, twirling  Green grippers Set at 3 red elastics  Theraband Ex - gave green band  Precautions: s/p ORIF to radius and ulna 11/14/18    Specialty Daily Treatment Diary     Manual  2/6/19 1/23/19 1/28/19 1/30/19 2/4/19   Myofascial soft tissue work and graston techniques To R wrist and forearm musculature To R wrist and forearm musculature To R wrist and forearm musculature To R wrist and forearm musculature To R wrist and forearm musculature   Joint mobs Ant/post/ ulnar/radial   Pronation /supination  Individual carpal glides  Traction and stretching grade 2-3 Ant/post/ ulnar/radial   Pronation /supination  Individual carpal glides  Traction and stretching grade 2-3 Ant/post/ ulnar/radial   Pronation /supination  Individual carpal glides  Traction and stretching grade 2-3 Ant/post/ ulnar/radial   Pronation /supination  Individual carpal glides  Traction and stretching grade 2-3 Ant/post/ ulnar/radial   Pronation /supination  Individual carpal glides  Traction and stretching grade 2-3   Kinesiotape To scars   To thumb and around wrist                           Exercise Diary  2/6/19 1/23/19 1/28/19 1/30/19 2/4/19   Wrist Maze x5 x5 x5 x5 x5   Hand Gym x20 x20 x20 x20 x20   Wrist weights Flexion  Ext  Radial Dev  Ulnar Dev  3lb x20 Flexion  Ext  Radial Dev  Ulnar Dev  4lb x20  Flexion  Ext  Radial Dev  Ulnar Dev  4lb x20 Flexion  Ext  Radial Dev  Ulnar Dev  4lb x20   Grippers 3Y x20 3Y x20 3Y x25 3Y x25 3Y x 20   Clothespegs Firm D1,2, 3  Light D2,3,4 Firm D1,2, 3  Light D2,3,4 Firm D1,2, 3  Light D2,3,4 Light D2,3,4 Firm D1,2, 3  Light D2,3,4   Pron/ supination  With powerbar x20 With hammer x20 With powerbar x20 With powerbar  x25 With powerbar x 20   Putty   Putty with gripping, pinching, kneading, Jar turns x20  Key turns x 15 Putty with gripping, pinching, kneading, Jar turns x20  Key turns x 20 Putty with gripping, pinching, kneading, Jar turns x20  Key turns x 20 Putty with gripping, pinching, kneading, Jar turns x20  Key turns x 15   Finger Ex     Red and blue x20   Flex Bar Red 1min  Orange bend  Red 1 min for stabilization  Orange bar bends x15 Red 1 min for stabilization  Orange bar bends x15 Red 1 min for stabilization  Orange bar bends x15 Red 1 min for stabilization  [place] bar bends x15   Wall pulleys   Triceps Pull (4)  IR down(4) Protraction (4)   ER down (3)  Biceps Curl (3)  x15 Triceps Pull (4)  IR down(3) Protraction (3)   ER down (2)  Biceps Curl (2)  x15 Triceps Pull (4)  IR down(4) Protraction (4)   ER down (4)  Biceps Curl (3)  x15 Triceps Pull (5)  IR down(4) Protraction (4)   ER down (3)  Biceps Curl (3)  x15 Triceps Pull (4)  IR down(4) Protraction (4)   ER down (4)  Biceps Curl (3)  x15                                                                               Modalities 2/6/19 1/23/19 1/28/19 1/30/19 2/4/19   Double Hot pack  X 5 min to wrist and forearm  X 5 min to wrist and forearm X 5 min to wrist and forearm X 5 min to wrist and forearm X 5 min to wrist and forearm                       Assessment: Patient tolerated session well  She has shown good improvement in Strength  She had a little increased pain today likely from overdoing the past couple weeks  I told her to discuss discharging with continuing home program or if her pain is still up, we can continue another few weeks       Plan: Continue Occupational Therapy ~2x/week to decrease pain and edema and improve ROM, strength and function

## 2019-02-11 ENCOUNTER — APPOINTMENT (OUTPATIENT)
Dept: RADIOLOGY | Facility: CLINIC | Age: 59
End: 2019-02-11
Payer: COMMERCIAL

## 2019-02-11 ENCOUNTER — OFFICE VISIT (OUTPATIENT)
Dept: OBGYN CLINIC | Facility: CLINIC | Age: 59
End: 2019-02-11

## 2019-02-11 VITALS
DIASTOLIC BLOOD PRESSURE: 81 MMHG | HEIGHT: 67 IN | BODY MASS INDEX: 31.71 KG/M2 | HEART RATE: 82 BPM | WEIGHT: 202 LBS | SYSTOLIC BLOOD PRESSURE: 140 MMHG

## 2019-02-11 DIAGNOSIS — S52.501D TRAUMATIC CLOSED DISPLACED FRACTURE OF DISTAL END OF RADIUS AND ULNA WITH ROUTINE HEALING, RIGHT: ICD-10-CM

## 2019-02-11 DIAGNOSIS — S52.601D TRAUMATIC CLOSED DISPLACED FRACTURE OF DISTAL END OF RADIUS AND ULNA WITH ROUTINE HEALING, RIGHT: ICD-10-CM

## 2019-02-11 DIAGNOSIS — M77.8 RIGHT WRIST TENDONITIS: Primary | ICD-10-CM

## 2019-02-11 DIAGNOSIS — S62.001D CLOSED NONDISPLACED FRACTURE OF SCAPHOID OF RIGHT WRIST WITH ROUTINE HEALING, UNSPECIFIED PORTION OF SCAPHOID, SUBSEQUENT ENCOUNTER: ICD-10-CM

## 2019-02-11 PROCEDURE — 99024 POSTOP FOLLOW-UP VISIT: CPT | Performed by: ORTHOPAEDIC SURGERY

## 2019-02-11 PROCEDURE — 73100 X-RAY EXAM OF WRIST: CPT

## 2019-02-11 RX ORDER — PREDNISONE 50 MG/1
50 TABLET ORAL DAILY
Qty: 5 TABLET | Refills: 0 | Status: SHIPPED | OUTPATIENT
Start: 2019-02-11 | End: 2019-08-01 | Stop reason: ALTCHOICE

## 2019-02-11 NOTE — PROGRESS NOTES
Assessment/Plan:  1  Traumatic closed displaced fracture of distal end of radius and ulna with routine healing, right  XR wrist 2 vw right   2  Closed nondisplaced fracture of scaphoid of right wrist with routine healing, unspecified portion of scaphoid, subsequent encounter     3  Right wrist tendonitis         Scribe Attestation    I,:   Shalini Reeder MA am acting as a scribe while in the presence of the attending physician :        I,:   Shwetha Lara,  personally performed the services described in this documentation    as scribed in my presence :              Arun Part is doing well  She has full range of motion of exam today  She is aware that the swelling can take months to fully resolve  A prescription was provided for Prednisone to help with her pain complaints  She is aware she has no restrictions  She was instructed to continue with scheduled formal physical therapy  She will follow up in 2 months for repeat evaluation  Subjective: Valentin Crandall is a 63 y o  female who presents to the office 12 weeks s/p ORIF right wrist performed on 11/14/18  She states she is doing well postoperatively  She states she has been complaint with formal physical therapy  She states her swelling is improving  She notes mild pain to the wrist with cold weather  She states the other day at work she was rolling coins and noted pain to the right wrist  She denies any numbness or tingling  Review of Systems   Constitutional: Negative for chills and fever  HENT: Negative for drooling and sneezing  Eyes: Negative for redness  Respiratory: Negative for cough and wheezing  Gastrointestinal: Negative for nausea and vomiting  Musculoskeletal: Negative for arthralgias, joint swelling and myalgias  Neurological: Negative for weakness and numbness  Psychiatric/Behavioral: Negative for behavioral problems  The patient is not nervous/anxious            Past Medical History:   Diagnosis Date    Anxiety     Borderline hyperlipidemia     Cancer (HCC)     Basal Cell skin ca    Ovarian cyst     Uterine fibroid     Vertigo        Past Surgical History:   Procedure Laterality Date    HERNIA REPAIR  2011    ORIF WRIST FRACTURE Right 11/14/2018    Procedure: OPEN REDUCTION W/ INTERNAL FIXATION (ORIF) RADIUS / ULNA (WRIST);   Surgeon: Ramila Bay DO;  Location: [facility];  Service: Orthopedics       Family History   Problem Relation Age of Onset    Cancer Mother     Alzheimer's disease Father     No Known Problems Sister     No Known Problems Brother     No Known Problems Maternal Aunt     No Known Problems Maternal Uncle     No Known Problems Paternal Aunt     No Known Problems Paternal Uncle     No Known Problems Maternal Grandmother     No Known Problems Maternal Grandfather     No Known Problems Paternal Grandmother     No Known Problems Paternal Grandfather     ADD / ADHD Neg Hx     Anesthesia problems Neg Hx     Clotting disorder Neg Hx     Collagen disease Neg Hx     Diabetes Neg Hx     Dislocations Neg Hx     Learning disabilities Neg Hx     Neurological problems Neg Hx     Osteoporosis Neg Hx     Rheumatologic disease Neg Hx     Scoliosis Neg Hx     Vascular Disease Neg Hx        Social History     Occupational History    Not on file   Tobacco Use    Smoking status: Never Smoker    Smokeless tobacco: Never Used   Substance and Sexual Activity    Alcohol use: Yes     Comment: social    Drug use: No    Sexual activity: Not on file         Current Outpatient Medications:     Ascorbic Acid ([employer] VITAMIN C) 500 MG CHEW, Chew 500 mg daily  , Disp: , Rfl:     calcium acetate (PHOSLO) 667 mg capsule, Take 1,334 mg by mouth 3 (three) times a day with meals, Disp: , Rfl:     cholecalciferol (VITAMIN D3) 1,000 units tablet, Take 1,000 Units by mouth daily, Disp: , Rfl:     ibuprofen (MOTRIN) 600 mg tablet, Take 1 tablet (600 mg total) by mouth every 6 (six) hours as needed for mild pain, Disp: 30 tablet, Rfl: 0    Multiple Vitamin (DAILY VITAMINS PO), Take by mouth daily  , Disp: , Rfl:     potassium chloride (K-DUR,KLOR-CON) 20 mEq tablet, Take one tab po bid x 3 days, Disp: 6 tablet, Rfl: 0    No Known Allergies    Objective:  Vitals:    02/11/19 1530   BP: 140/81   Pulse: 82       Ortho Exam     Right wrist    Incision well healed  NTTP fracture site  Full elbow ROM  Full wrist ROM  EPL intact  Mild swelling ulnar aspect   Sensation intact median, radial, and ulnar nerve  Compartments soft  Brisk capillary refill     Patient is tender over the 4th dorsal compartment  Mild swelling in this area  Mild tenderness    Physical Exam   Constitutional: She is oriented to person, place, and time  She appears well-developed and well-nourished  HENT:   Head: Normocephalic and atraumatic  Eyes: Conjunctivae are normal  Right eye exhibits no discharge  Left eye exhibits no discharge  Neck: Normal range of motion  Neck supple  Cardiovascular: Normal rate and intact distal pulses  Pulmonary/Chest: Effort normal  No respiratory distress  Musculoskeletal:   As noted in HPI   Neurological: She is alert and oriented to person, place, and time  Skin: Skin is warm and dry  Psychiatric: She has a normal mood and affect  Her behavior is normal  Judgment and thought content normal        I have personally reviewed pertinent films in PACS and my interpretation is as follows:X-ray right wrist performed in the office today demonstrates orthopedic hardware intact

## 2019-02-13 ENCOUNTER — OFFICE VISIT (OUTPATIENT)
Dept: OCCUPATIONAL THERAPY | Facility: CLINIC | Age: 59
End: 2019-02-13
Payer: COMMERCIAL

## 2019-02-13 DIAGNOSIS — S52.501D TRAUMATIC CLOSED DISPLACED FRACTURE OF DISTAL END OF RIGHT RADIUS AND ULNA, WITH ROUTINE HEALING, SUBSEQUENT ENCOUNTER: Primary | ICD-10-CM

## 2019-02-13 DIAGNOSIS — S52.601D TRAUMATIC CLOSED DISPLACED FRACTURE OF DISTAL END OF RIGHT RADIUS AND ULNA, WITH ROUTINE HEALING, SUBSEQUENT ENCOUNTER: Primary | ICD-10-CM

## 2019-02-13 PROCEDURE — 97530 THERAPEUTIC ACTIVITIES: CPT | Performed by: OCCUPATIONAL THERAPIST

## 2019-02-13 PROCEDURE — 97110 THERAPEUTIC EXERCISES: CPT | Performed by: OCCUPATIONAL THERAPIST

## 2019-02-13 PROCEDURE — 97140 MANUAL THERAPY 1/> REGIONS: CPT | Performed by: OCCUPATIONAL THERAPIST

## 2019-02-13 NOTE — PROGRESS NOTES
Daily Note     Today's date: 2019  Patient name: Mckayla Gong  : 1960  MRN: [id number]  Referring provider: Marva Brown DO  Dx:   Encounter Diagnosis     ICD-10-CM    1  Traumatic closed displaced fracture of distal end of right radius and ulna, with routine healing, subsequent encounter S52 501D     S52 601D        Start Time: 1550  Stop Time: 1645  Total time in clinic (min): 55 minutes    Subjective: Patient reports pain level as 0/10 today  Objective:   Completed Hot pack for warm up, manual therapy, exercises and activities to increase ROM, strength, coordination and function  See Treatment Diary below  Home Program:See Media Section for details  Wrist ROM ex  Yellow sponge for gripping, pinching, twirling  Green grippers Set at 3 red elastics  Theraband Ex - gave green band  Precautions: s/p ORIF to radius and ulna 18    Specialty Daily Treatment Diary     Manual  19   Myofascial soft tissue work and graston techniques To R wrist and forearm musculature To R wrist and forearm musculature To R wrist and forearm musculature To R wrist and forearm musculature To R wrist and forearm musculature   Joint mobs Ant/post/ ulnar/radial   Pronation /supination  Individual carpal glides  Traction and stretching grade 2-3 Ant/post/ ulnar/radial   Pronation /supination  Individual carpal glides  Traction and stretching grade 2-3 Ant/post/ ulnar/radial   Pronation /supination  Individual carpal glides  Traction and stretching grade 2-3 Ant/post/ ulnar/radial   Pronation /supination  Individual carpal glides  Traction and stretching grade 2-3 Ant/post/ ulnar/radial   Pronation /supination  Individual carpal glides  Traction and stretching grade 2-3   Kinesiotape To scars   To thumb and around wrist                           Exercise Diary  19   Wrist Maze x5 x5 x5 x5 x5   Hand Gym x20 x20 x20 x20 x20   Wrist weights Flexion  Ext  Radial Dev  Ulnar Dev  3lb x20 Flexion  Ext  Radial Dev  Ulnar Dev  3lb x20  Flexion  Ext  Radial Dev  Ulnar Dev  4lb x20 Flexion  Ext  Radial Dev  Ulnar Dev  4lb x20   Grippers 3Y x20 3Y x20 3Y x25 3Y x25 3Y x 20   Clothespegs Firm D1,2, 3  Light D2,3,4 Firm D1,2, 3  Light D2,3,4 Firm D1,2, 3  Light D2,3,4 Light D2,3,4 Firm D1,2, 3  Light D2,3,4   Pron/ supination  With powerbar x20 With hammer x20 With powerbar x20 With powerbar  x25 With powerbar x 20   Putty   Putty with gripping, pinching, kneading, Jar turns x20  Key turns x 15 Putty with gripping, pinching, kneading, Jar turns x20  Key turns x 20 Putty with gripping, pinching, kneading, Jar turns x20  Key turns x 20 Putty with gripping, pinching, kneading, Jar turns x20  Key turns x 15   Finger Ex     Red and blue x20   Flex Bar Red 1min  Orange bend  Red 1 min for stabilization  Orange bar bends x15 Red 1 min for stabilization  Orange bar bends x15 Red 1 min for stabilization  Orange bar bends x15 Red 1 min for stabilization  [place] bar bends x15   Wall pulleys   Triceps Pull (4)  IR down(4) Protraction (4)   ER down (3)  Biceps Curl (3)  x15 Triceps Pull (4)  IR down(3) Protraction (3)   ER down (2)  Biceps Curl (2)  x15 Triceps Pull (4)  IR down(4) Protraction (4)   ER down (4)  Biceps Curl (3)  x15 Triceps Pull (5)  IR down(4) Protraction (4)   ER down (3)  Biceps Curl (3)  x15 Triceps Pull (4)  IR down(4) Protraction (4)   ER down (4)  Biceps Curl (3)  x15                                                                               Modalities 2/6/19 2/13/19 1/28/19 1/30/19 2/4/19   Double Hot pack  X 5 min to wrist and forearm  X 5 min to wrist and forearm X 5 min to wrist and forearm X 5 min to wrist and forearm X 5 min to wrist and forearm                       Assessment: Patient tolerated session well  No increase of pain throughout session  Decreased wrist weights to prevent overuse      Plan: Continue Occupational Therapy ~2x/week to decrease pain and edema and improve ROM, strength and function

## 2019-02-18 ENCOUNTER — APPOINTMENT (OUTPATIENT)
Dept: OCCUPATIONAL THERAPY | Facility: CLINIC | Age: 59
End: 2019-02-18
Payer: COMMERCIAL

## 2019-02-20 ENCOUNTER — APPOINTMENT (OUTPATIENT)
Dept: OCCUPATIONAL THERAPY | Facility: CLINIC | Age: 59
End: 2019-02-20
Payer: COMMERCIAL

## 2019-02-21 ENCOUNTER — OFFICE VISIT (OUTPATIENT)
Dept: OCCUPATIONAL THERAPY | Facility: CLINIC | Age: 59
End: 2019-02-21
Payer: COMMERCIAL

## 2019-02-21 DIAGNOSIS — S52.601D TRAUMATIC CLOSED DISPLACED FRACTURE OF DISTAL END OF RIGHT RADIUS AND ULNA, WITH ROUTINE HEALING, SUBSEQUENT ENCOUNTER: Primary | ICD-10-CM

## 2019-02-21 DIAGNOSIS — S52.501D TRAUMATIC CLOSED DISPLACED FRACTURE OF DISTAL END OF RIGHT RADIUS AND ULNA, WITH ROUTINE HEALING, SUBSEQUENT ENCOUNTER: Primary | ICD-10-CM

## 2019-02-21 PROCEDURE — 97140 MANUAL THERAPY 1/> REGIONS: CPT | Performed by: OCCUPATIONAL THERAPIST

## 2019-02-21 PROCEDURE — 97530 THERAPEUTIC ACTIVITIES: CPT | Performed by: OCCUPATIONAL THERAPIST

## 2019-02-21 NOTE — PROGRESS NOTES
Daily Note     Today's date: 2019  Patient name: Georgette Herrera  : 1960  MRN: [id number]  Referring provider: Nicole Dumont DO  Dx:   Encounter Diagnosis     ICD-10-CM    1  Traumatic closed displaced fracture of distal end of right radius and ulna, with routine healing, subsequent encounter S52 501D     S52 601D        Start Time: 1700  Stop Time: 1745  Total time in clinic (min): 45 minutes    Subjective: Patient reports pain level as 0/10 today  Objective:   Completed Hot pack for warm up, manual therapy, exercises and activities to increase ROM, strength, coordination and function  See Treatment Diary below  Home Program:See Media Section for details  Wrist ROM ex  Yellow sponge for gripping, pinching, twirling  Green grippers Set at 3 red elastics  Theraband Ex - gave green band  Precautions: s/p ORIF to radius and ulna 18    Specialty Daily Treatment Diary     Manual  19   Myofascial soft tissue work and graston techniques To R wrist and forearm musculature To R wrist and forearm musculature To R wrist and forearm musculature To R wrist and forearm musculature To R wrist and forearm musculature   Joint mobs Ant/post/ ulnar/radial   Pronation /supination  Individual carpal glides  Traction and stretching grade 2-3 Ant/post/ ulnar/radial   Pronation /supination  Individual carpal glides  Traction and stretching grade 2-3 Ant/post/ ulnar/radial   Pronation /supination  Individual carpal glides  Traction and stretching grade 2-3 Ant/post/ ulnar/radial   Pronation /supination  Individual carpal glides  Traction and stretching grade 2-3 Ant/post/ ulnar/radial   Pronation /supination  Individual carpal glides  Traction and stretching grade 2-3   Kinesiotape To scars   To thumb and around wrist                           Exercise Diary  19   Wrist Maze x5 x5 x5 x5 x5   Hand Gym x20 x20  x20 x20   Wrist weights Flexion  Ext  Radial Dev  Ulnar Dev  3lb x20 Flexion  Ext  Radial Dev  Ulnar Dev  3lb x20 Flexion  Ext  Radial Dev  Ulnar Dev  3lb x20 Flexion  Ext  Radial Dev  Ulnar Dev  4lb x20 Flexion  Ext  Radial Dev  Ulnar Dev  4lb x20   Grippers 3Y x20 3Y x20 3Y x25 3Y x25 3Y x 20   Clothespegs Firm D1,2, 3  Light D2,3,4 Firm D1,2, 3  Light D2,3,4 Firm D1,2, 3  Light D2,3,4 Light D2,3,4 Firm D1,2, 3  Light D2,3,4   Pron/ supination  With powerbar x20 With hammer x20 With powerbar x20 With powerbar  x25 With powerbar x 20   Putty   Putty with gripping, pinching, kneading, Jar turns x20  Key turns x 15  Putty with gripping, pinching, kneading, Jar turns x20  Key turns x 20 Putty with gripping, pinching, kneading, Jar turns x20  Key turns x 15   Finger Ex     Red and blue x20   Flex Bar Red 1min  Orange bend  Red 1 min for stabilization  Orange bar bends x15 Red 1 min for stabilization  Orange bar bends x15 Red 1 min for stabilization  Orange bar bends x15 Red 1 min for stabilization  [place] bar bends x15   Wall pulleys   Triceps Pull (4)  IR down(4) Protraction (4)   ER down (3)  Biceps Curl (3)  x15 Triceps Pull (4)  IR down(3) Protraction (3)   ER down (2)  Biceps Curl (2)  x15 Triceps Pull (4)  IR down(4) Protraction (4)   ER down (4)  Biceps Curl (3)  x15 Triceps Pull (5)  IR down(4) Protraction (4)   ER down (3)  Biceps Curl (3)  x15 Triceps Pull (4)  IR down(4) Protraction (4)   ER down (4)  Biceps Curl (3)  x15                                                                               Modalities 2/6/19 2/13/19 2/21/19 1/30/19 2/4/19   Double Hot pack  X 5 min to wrist and forearm  X 5 min to wrist and forearm X 5 min to wrist and forearm X 5 min to wrist and forearm X 5 min to wrist and forearm                       Assessment: Patient tolerated session well  No increase of pain throughout session       Plan: Continue Occupational Therapy ~2x/week to decrease pain and edema and improve ROM, strength and function

## 2019-02-27 ENCOUNTER — OFFICE VISIT (OUTPATIENT)
Dept: OCCUPATIONAL THERAPY | Facility: CLINIC | Age: 59
End: 2019-02-27
Payer: COMMERCIAL

## 2019-02-27 DIAGNOSIS — S52.501D TRAUMATIC CLOSED DISPLACED FRACTURE OF DISTAL END OF RIGHT RADIUS AND ULNA, WITH ROUTINE HEALING, SUBSEQUENT ENCOUNTER: Primary | ICD-10-CM

## 2019-02-27 DIAGNOSIS — S52.601D TRAUMATIC CLOSED DISPLACED FRACTURE OF DISTAL END OF RIGHT RADIUS AND ULNA, WITH ROUTINE HEALING, SUBSEQUENT ENCOUNTER: Primary | ICD-10-CM

## 2019-02-27 PROCEDURE — G8985 CARRY GOAL STATUS: HCPCS | Performed by: OCCUPATIONAL THERAPIST

## 2019-02-27 PROCEDURE — G8986 CARRY D/C STATUS: HCPCS | Performed by: OCCUPATIONAL THERAPIST

## 2019-02-27 PROCEDURE — 97140 MANUAL THERAPY 1/> REGIONS: CPT | Performed by: OCCUPATIONAL THERAPIST

## 2019-02-27 PROCEDURE — 97110 THERAPEUTIC EXERCISES: CPT | Performed by: OCCUPATIONAL THERAPIST

## 2019-02-27 NOTE — PROGRESS NOTES
OT Discharge     Today's date: 2019  Patient name: Tulio Grover  : 1960  MRN: [id number]  Referring provider: Zhang Evangelista DO  Dx:   Encounter Diagnosis     ICD-10-CM    1  Traumatic closed displaced fracture of distal end of right radius and ulna, with routine healing, subsequent encounter S52 501D     S52 601D        Start Time: 0500  Stop Time: 0530  Total time in clinic (min): 30 minutes    Assessment  Assessment details: CASE SUMMARY:   Tulio Grover is a 63y o  year old female who suffered a fracture of her ulna and radius in a fall, on 18  She underwent ORIF to both radius and ulna on 18  PMHx includes: See chart for full details with medications  She is   Some of her interests include: walking, play computer games, takes care of 10 month old granddaughter  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, 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, help with toileting, and computer work  DISCHARGE UPDATE: 19  Tulio Grover was initially seen in our department on 19 and has since been seen ~2x/week  Treatment includes moist heat, manual therapy with myofacial soft tissue work and Graston techniques, PROM and joint mobs, therapeutic exercises to increase ROM and strength  She is showing good improvement in ROM and strength  Pain level is decreasing to 0-1/10  She had some discomfort on the ulnar side of the wrist occasionally  She is able to do more ADLs and cleaning tasks  Work tasks are going well  At this time she will be discharged from therapy to continue with home program for one month  FUNCTIONAL SUMMARY:   Tulio Grover is independent in basic self care skills  She has minimal difficulty with lifting, cooking, cleaning and work tasks  FOTO score is improved to 98% with a 2% limit in function       IMPAIRMENTS:  Tulio Grover presents with:  Healed incision with no adhesion,   Mildly increased edema,   Improved ROM in the wrist, Improved wrist and hand strength,   Intact sensation,  Pain level at 0-1/10   Difficulty with ADLs and work tasks  Patient will be discharged from therapy to continue with home program for one month  Understanding of Dx/Px/POC: good   Prognosis: good    Goals  Short Term Goals:   to be completed in 6-8 weeks  1  Improve scar mobility through scar massage, Graston techniques and /or kinesiotape , Met  2  Decrease edema of wrist through manual lymphatic drainage massage, tubigrip stockinette, and /or kinesiotape ,  Mostly Met  3  Increase ROM of R wrist to WNLs, through the use of heat modalities, manual therapy with Graston techniques, PROM, joint mobilizations, AROM exercises, flexion taping  as needed  Met  4  Increase R wrist to 5/5 and hand strength right =50% of unaffected side  Met  5  Decrease pain to 0-3/10  Met    Long Term Goals: To be completed in 8-10 weeks  1  Ability to perform lifting, carrying, cooking,cleaning tasks and work tasks with minimal to no discomfort, Met  2  Patient demonstrates good carryover of HEP, Met  3  Minimal to no pain complaints  0-2/10, Met  4  Full ROM of R wrist  Met  5  Improved wrist strength to 5/5 and hand strength  right =75% of unaffected side  Met          Subjective Evaluation    Pain  Current pain ratin  At best pain ratin  At worst pain ratin  Location: In R Wrist   Quality: dull ache    Hand dominance: right          Objective     Observations     Additional Observation Details  Incisions healed with no adhesions  Neurological Testing     Sensation     Wrist/Hand     Right   Intact: light touch    Additional Neurological Details  No numbness or tingling  Active Range of Motion     Right Elbow   Forearm supination: 90 degrees   Forearm pronation: 90 degrees     Additional Active Range of Motion Details  Fingers are WNLs  Thumb opposition to D5 base       Passive Range of Motion     Right Wrist   Wrist flexion: 65 degrees   Wrist extension: 60 degrees   Radial deviation: 17 degrees   Ulnar deviation: 25 degrees     Strength/Myotome Testing     Left Elbow   Forearm supination: WFL    Left Wrist/Hand      (2nd hand position)     Trial 1: 65    Thumb Strength  Key/Lateral Pinch     Trail 1: 10  Tip/Two-Point Pinch     Trial 1: 7  Palmar/Three-Point Pinch     Trial 1: 9    Right Wrist/Hand   Wrist extension: 4+  Wrist flexion: 4+     (2nd hand position)     Trial 1: 43    Thumb Strength   Key/Lateral Pinch     Trial 1: 10  Tip/Two-Point Pinch     Trial 1: 8  Palmar/Three-Point Pinch     Trial 1: 11    Swelling     Left Wrist/Hand   Circumference wrist: 16 cm    Right Wrist/Hand   Circumference wrist: 17 2 cm      Flowsheet Rows      Most Recent Value   PT/OT G-Codes   Current Score  98   Projected Score  58   Assessment Type  Discharge   G code set  Carrying, Moving & Handling Objects   Carrying, Moving and Handling Objects Goal Status ()  CK   Carrying, Moving and Handling Objects Discharge Status ()  CI          Treatment Today  Subjective: Patient reports pain level as 0/10 today      Objective: Re-evaluation completed see report for discharge summary  Completed Hot pack for warm up, manual therapy, exercises and activities to increase ROM, strength, coordination and function  See Treatment Diary below  Gave extra tubigrip for home      Home Program:See Media Section for details  Wrist ROM ex  Yellow sponge for gripping, pinching, twirling     Green grippers Set at 3 red elastics  Theraband Ex - gave green band       Precautions: s/p ORIF to radius and ulna 11/14/18     Specialty Daily Treatment Diary      Manual  2/6/19 2/13/19 2/21/19 2/27/19 2/4/19   Myofascial soft tissue work and graston techniques To R wrist and forearm musculature To R wrist and forearm musculature To R wrist and forearm musculature To R wrist and forearm musculature To R wrist and forearm musculature   Joint mobs Ant/post/ ulnar/radial Pronation /supination  Individual carpal glides  Traction and stretching grade 2-3 Ant/post/ ulnar/radial   Pronation /supination  Individual carpal glides  Traction and stretching grade 2-3 Ant/post/ ulnar/radial   Pronation /supination  Individual carpal glides  Traction and stretching grade 2-3 Ant/post/ ulnar/radial   Pronation /supination  Individual carpal glides  Traction and stretching grade 2-3 Ant/post/ ulnar/radial   Pronation /supination  Individual carpal glides  Traction and stretching grade 2-3   Kinesiotape To scars   To thumb and around wrist                                            Exercise Diary  2/6/19 2/13/19 2/21/19 2/27/19 2/4/19   Wrist Maze x5 x5 x5 x5 x5   Hand Gym x20 x20   x20 x20   Wrist weights Flexion  Ext  Radial Dev  Ulnar Dev  3lb x20 Flexion  Ext  Radial Dev  Ulnar Dev  3lb x20 Flexion  Ext  Radial Dev  Ulnar Dev  3lb x20 Flexion  Ext  Radial Dev  Ulnar Dev  3lb x20 Flexion  Ext  Radial Dev  Ulnar Dev  4lb x20   Grippers 3Y x20 3Y x20 3Y x25 3Y x25 3Y x 20   Clothespegs Firm D1,2, 3  Light D2,3,4 Firm D1,2, 3  Light D2,3,4 Firm D1,2, 3  Light D2,3,4 Firm D2,3 4  Light D5 Firm D1,2, 3  Light D2,3,4   Pron/ supination  With powerbar x20 With hammer x20 With powerbar x20 With powerbar  x25 With powerbar x 20   Putty    Putty with gripping, pinching, kneading, Jar turns x20  Key turns x 15   Putty with gripping, pinching, kneading, Jar turns x20  Key turns x 20 Putty with gripping, pinching, kneading, Jar turns x20  Key turns x 15   Finger Ex         Red and blue x20   Flex Bar Red 1min  Orange bend  Red 1 min for stabilization  Orange bar bends x15 Red 1 min for stabilization  Orange bar bends x15 Red 1 min for stabilization  Orange bar bends x15 Red 1 min for stabilization  [place] bar bends x15   Wall pulleys    Triceps Pull (4)  IR down(4) Protraction (4)   ER down (3)  Biceps Curl (3)  x15 Triceps Pull (4)  IR down(3) Protraction (3)   ER down (2)  Biceps Curl (2)  x15 Triceps Pull (4)  IR down(4) Protraction (4)   ER down (4)  Biceps Curl (3)  x15  Triceps Pull (4)  IR down(4) Protraction (4)   ER down (4)  Biceps Curl (3)  x15                                                                                                                                       Modalities 2/6/19 2/13/19 2/21/19 2/27/19 2/4/19   Double Hot pack  X 5 min to wrist and forearm  X 5 min to wrist and forearm X 5 min to wrist and forearm X 5 min to wrist and forearm X 5 min to wrist and forearm                                     Assessment: Patient tolerated session well  No increase of pain throughout session  Showed increase in strength and function       Plan: Patient feels comfortable with discharge at this time  She will continue with home exercise program for one month

## 2019-04-08 ENCOUNTER — HOSPITAL ENCOUNTER (OUTPATIENT)
Dept: RADIOLOGY | Facility: HOSPITAL | Age: 59
Discharge: HOME | End: 2019-04-08
Attending: ORTHOPAEDIC SURGERY
Payer: COMMERCIAL

## 2019-04-08 ENCOUNTER — OFFICE VISIT (OUTPATIENT)
Dept: OBGYN CLINIC | Facility: CLINIC | Age: 59
End: 2019-04-08
Payer: COMMERCIAL

## 2019-04-08 ENCOUNTER — APPOINTMENT (OUTPATIENT)
Dept: RADIOLOGY | Facility: CLINIC | Age: 59
End: 2019-04-08
Payer: COMMERCIAL

## 2019-04-08 VITALS
DIASTOLIC BLOOD PRESSURE: 90 MMHG | WEIGHT: 195 LBS | HEART RATE: 81 BPM | HEIGHT: 67 IN | BODY MASS INDEX: 30.61 KG/M2 | SYSTOLIC BLOOD PRESSURE: 156 MMHG

## 2019-04-08 DIAGNOSIS — S52.601D TRAUMATIC CLOSED DISPLACED FRACTURE OF DISTAL END OF RADIUS AND ULNA WITH ROUTINE HEALING, RIGHT: ICD-10-CM

## 2019-04-08 DIAGNOSIS — S52.602D TRAUMATIC CLOSED DISPLACED FRACTURE OF DISTAL END OF LEFT RADIUS AND ULNA WITH ROUTINE HEALING, SUBSEQUENT ENCOUNTER: ICD-10-CM

## 2019-04-08 DIAGNOSIS — S52.601D TRAUMATIC CLOSED DISPLACED FRACTURE OF DISTAL END OF RADIUS AND ULNA WITH ROUTINE HEALING, RIGHT: Primary | ICD-10-CM

## 2019-04-08 DIAGNOSIS — S52.501D TRAUMATIC CLOSED DISPLACED FRACTURE OF DISTAL END OF RADIUS AND ULNA WITH ROUTINE HEALING, RIGHT: Primary | ICD-10-CM

## 2019-04-08 DIAGNOSIS — S52.502D TRAUMATIC CLOSED DISPLACED FRACTURE OF DISTAL END OF LEFT RADIUS AND ULNA WITH ROUTINE HEALING, SUBSEQUENT ENCOUNTER: ICD-10-CM

## 2019-04-08 DIAGNOSIS — S52.501D TRAUMATIC CLOSED DISPLACED FRACTURE OF DISTAL END OF RADIUS AND ULNA WITH ROUTINE HEALING, RIGHT: ICD-10-CM

## 2019-04-08 PROCEDURE — 99213 OFFICE O/P EST LOW 20 MIN: CPT | Performed by: ORTHOPAEDIC SURGERY

## 2019-04-08 PROCEDURE — 73110 X-RAY EXAM OF WRIST: CPT

## 2019-04-08 PROCEDURE — 73200 CT UPPER EXTREMITY W/O DYE: CPT

## 2019-04-09 ENCOUNTER — TELEPHONE (OUTPATIENT)
Dept: OBGYN CLINIC | Facility: CLINIC | Age: 59
End: 2019-04-09

## 2019-08-01 ENCOUNTER — TELEPHONE (OUTPATIENT)
Dept: FAMILY MEDICINE CLINIC | Facility: CLINIC | Age: 59
End: 2019-08-01

## 2019-08-01 ENCOUNTER — OFFICE VISIT (OUTPATIENT)
Dept: FAMILY MEDICINE CLINIC | Facility: CLINIC | Age: 59
End: 2019-08-01
Payer: COMMERCIAL

## 2019-08-01 VITALS
WEIGHT: 205 LBS | HEART RATE: 76 BPM | BODY MASS INDEX: 32.18 KG/M2 | TEMPERATURE: 98 F | DIASTOLIC BLOOD PRESSURE: 84 MMHG | RESPIRATION RATE: 16 BRPM | SYSTOLIC BLOOD PRESSURE: 134 MMHG | HEIGHT: 67 IN

## 2019-08-01 DIAGNOSIS — J02.9 SORETHROAT: Primary | ICD-10-CM

## 2019-08-01 DIAGNOSIS — J02.9 SORETHROAT: ICD-10-CM

## 2019-08-01 PROCEDURE — 99213 OFFICE O/P EST LOW 20 MIN: CPT | Performed by: FAMILY MEDICINE

## 2019-08-01 PROCEDURE — 3008F BODY MASS INDEX DOCD: CPT | Performed by: FAMILY MEDICINE

## 2019-08-01 RX ORDER — AMOXICILLIN 875 MG/1
875 TABLET, COATED ORAL 2 TIMES DAILY
Qty: 20 TABLET | Refills: 0 | Status: SHIPPED | OUTPATIENT
Start: 2019-08-01 | End: 2019-08-11

## 2019-08-01 RX ORDER — PREDNISONE 20 MG/1
TABLET ORAL
Qty: 7 TABLET | Refills: 0 | Status: SHIPPED | OUTPATIENT
Start: 2019-08-01 | End: 2022-01-19 | Stop reason: ALTCHOICE

## 2019-08-01 RX ORDER — AMOXICILLIN 875 MG/1
875 TABLET, COATED ORAL 2 TIMES DAILY
Qty: 20 TABLET | Refills: 0 | Status: SHIPPED | OUTPATIENT
Start: 2019-08-01 | End: 2019-08-01 | Stop reason: SDUPTHER

## 2019-08-01 NOTE — TELEPHONE ENCOUNTER
Patient went and picked up her Rx of Amoxcillian and by the time she came home from shopping she had lost the prescription  Is there anyway that you could send another one into [facility] in [place]? Please call back to discuss further

## 2019-08-01 NOTE — LETTER
August 1, 2019     Patient: Agatha Vera   YOB: 1960   Date of Visit: 8/1/2019       To Whom it May Concern: Mela Ramirez is under my professional care  She was seen in my office on 8/1/2019  She may return to work on 8/2/19  Please excuse work absence 8/1/19  If you have any questions or concerns, please don't hesitate to call           Sincerely,          Cornel Molina MD

## 2019-08-04 NOTE — PROGRESS NOTES
Assessment/Plan:     Diagnoses and all orders for this visit:    Jeffery  Comments:  Rxs for amoxicillin and prednisone given  May continue throat gargle/sprays ; OTC Tylenol as needed  Orders:  -     Discontinue: amoxicillin (AMOXIL) 875 mg tablet; Take 1 tablet (875 mg total) by mouth 2 (two) times a day for 10 days  -     predniSONE 20 mg tablet; 2 tabs po x2d, 1 tab po x2d, 1/2 tab po x2d-take w food            Subjective:      Patient ID: Lizzeth Aguilar is a 63 y o  female  Chief Complaint   Patient presents with    Sore Throat     off and on times 2 weeks       Sore Throat    This is a new problem  The current episode started 1 to 4 weeks ago  The problem has been waxing and waning  The pain is moderate  Associated symptoms include a hoarse voice and swollen glands  Pertinent negatives include no coughing, drooling, trouble swallowing or vomiting  She has tried acetaminophen and gargles for the symptoms  The treatment provided mild relief  The following portions of the patient's history were reviewed and updated as appropriate: allergies, current medications, past family history, past medical history, past social history, past surgical history and problem list      Review of Systems   Constitutional: Positive for fatigue  HENT: Positive for hoarse voice, postnasal drip and sore throat  Negative for drooling and trouble swallowing  Respiratory: Negative  Negative for cough  Cardiovascular: Negative  Gastrointestinal: Negative  Negative for vomiting  Musculoskeletal: Positive for arthralgias  Neurological: Negative  Psychiatric/Behavioral: Negative  Objective:    /84 (BP Location: Left arm, Patient Position: Sitting, Cuff Size: Extra-Large)   Pulse 76   Temp 98 °F (36 7 °C) (Tympanic)   Resp 16   Ht 5' 7" (1 702 m)   Wt 93 kg (205 lb)   BMI 32 11 kg/m²        Physical Exam   Constitutional: She is oriented to person, place, and time     OW, acutely ill   HENT: Head: Normocephalic and atraumatic  Right Ear: Tympanic membrane normal    Left Ear: Tympanic membrane normal    Mouth/Throat: Posterior oropharyngeal erythema present  No oropharyngeal exudate or tonsillar abscesses  Eyes: Pupils are equal, round, and reactive to light  EOM are normal    Neck: Neck supple  Cardiovascular: Normal rate and regular rhythm  Pulmonary/Chest: Effort normal and breath sounds normal    Abdominal: Soft  Bowel sounds are normal  There is no tenderness  Neurological: She is alert and oriented to person, place, and time  Skin: Skin is warm and dry  No rash noted  Psychiatric: She has a normal mood and affect  Nursing note and vitals reviewed                Cornel Molina MD

## 2019-10-11 ENCOUNTER — TELEPHONE (OUTPATIENT)
Dept: FAMILY MEDICINE CLINIC | Facility: CLINIC | Age: 59
End: 2019-10-11

## 2019-10-11 DIAGNOSIS — Z12.39 BREAST CANCER SCREENING: Primary | ICD-10-CM

## 2019-10-11 NOTE — TELEPHONE ENCOUNTER
Dr Orr Media:    Patient needs an order for a mammogram   Please advise when this is ready for her to

## 2019-10-14 ENCOUNTER — CLINICAL SUPPORT (OUTPATIENT)
Dept: FAMILY MEDICINE CLINIC | Facility: CLINIC | Age: 59
End: 2019-10-14
Payer: COMMERCIAL

## 2019-10-14 ENCOUNTER — TELEPHONE (OUTPATIENT)
Dept: FAMILY MEDICINE CLINIC | Facility: CLINIC | Age: 59
End: 2019-10-14

## 2019-10-14 DIAGNOSIS — Z23 NEED FOR VACCINATION: Primary | ICD-10-CM

## 2019-10-14 PROCEDURE — 90682 RIV4 VACC RECOMBINANT DNA IM: CPT

## 2019-10-14 PROCEDURE — 90471 IMMUNIZATION ADMIN: CPT

## 2019-10-30 ENCOUNTER — HOSPITAL ENCOUNTER (OUTPATIENT)
Dept: RADIOLOGY | Facility: HOSPITAL | Age: 59
Discharge: HOME | End: 2019-10-30
Attending: FAMILY MEDICINE
Payer: COMMERCIAL

## 2019-10-30 VITALS — BODY MASS INDEX: 29.82 KG/M2 | WEIGHT: 190 LBS | HEIGHT: 67 IN

## 2019-10-30 DIAGNOSIS — Z12.39 BREAST CANCER SCREENING: ICD-10-CM

## 2019-10-30 PROCEDURE — 77067 SCR MAMMO BI INCL CAD: CPT

## 2019-10-30 PROCEDURE — 77063 BREAST TOMOSYNTHESIS BI: CPT

## 2020-10-20 ENCOUNTER — CLINICAL SUPPORT (OUTPATIENT)
Dept: FAMILY MEDICINE CLINIC | Facility: CLINIC | Age: 60
End: 2020-10-20
Payer: COMMERCIAL

## 2020-10-20 ENCOUNTER — TELEPHONE (OUTPATIENT)
Dept: FAMILY MEDICINE CLINIC | Facility: CLINIC | Age: 60
End: 2020-10-20

## 2020-10-20 DIAGNOSIS — Z23 NEED FOR VACCINATION: Primary | ICD-10-CM

## 2020-10-20 DIAGNOSIS — Z12.31 ENCOUNTER FOR SCREENING MAMMOGRAM FOR MALIGNANT NEOPLASM OF BREAST: Primary | ICD-10-CM

## 2020-10-20 PROCEDURE — 90682 RIV4 VACC RECOMBINANT DNA IM: CPT

## 2020-10-20 PROCEDURE — 90471 IMMUNIZATION ADMIN: CPT

## 2020-11-28 ENCOUNTER — OFFICE VISIT (OUTPATIENT)
Dept: FAMILY MEDICINE CLINIC | Facility: CLINIC | Age: 60
End: 2020-11-28
Payer: COMMERCIAL

## 2020-11-28 VITALS
HEART RATE: 88 BPM | DIASTOLIC BLOOD PRESSURE: 84 MMHG | TEMPERATURE: 97.6 F | RESPIRATION RATE: 14 BRPM | SYSTOLIC BLOOD PRESSURE: 120 MMHG | BODY MASS INDEX: 32.02 KG/M2 | HEIGHT: 67 IN | WEIGHT: 204 LBS

## 2020-11-28 DIAGNOSIS — L29.0 PERIANAL IRRITATION: Primary | ICD-10-CM

## 2020-11-28 PROCEDURE — 3725F SCREEN DEPRESSION PERFORMED: CPT | Performed by: NURSE PRACTITIONER

## 2020-11-28 PROCEDURE — 3008F BODY MASS INDEX DOCD: CPT | Performed by: NURSE PRACTITIONER

## 2020-11-28 PROCEDURE — 1036F TOBACCO NON-USER: CPT | Performed by: NURSE PRACTITIONER

## 2020-11-28 PROCEDURE — 99212 OFFICE O/P EST SF 10 MIN: CPT | Performed by: NURSE PRACTITIONER

## 2020-11-28 RX ORDER — NYSTATIN 100000 U/G
CREAM TOPICAL 2 TIMES DAILY
Qty: 30 G | Refills: 0 | Status: SHIPPED | OUTPATIENT
Start: 2020-11-28 | End: 2022-01-19 | Stop reason: ALTCHOICE

## 2020-11-28 RX ORDER — ZINC OXIDE 13 %
CREAM (GRAM) TOPICAL 2 TIMES DAILY
COMMUNITY
Start: 2020-11-28 | End: 2022-01-19 | Stop reason: ALTCHOICE

## 2020-12-08 ENCOUNTER — TELEPHONE (OUTPATIENT)
Dept: FAMILY MEDICINE CLINIC | Facility: CLINIC | Age: 60
End: 2020-12-08

## 2020-12-08 ENCOUNTER — OFFICE VISIT (OUTPATIENT)
Dept: FAMILY MEDICINE CLINIC | Facility: CLINIC | Age: 60
End: 2020-12-08
Payer: COMMERCIAL

## 2020-12-08 VITALS
RESPIRATION RATE: 14 BRPM | WEIGHT: 198.4 LBS | HEIGHT: 65 IN | SYSTOLIC BLOOD PRESSURE: 136 MMHG | HEART RATE: 80 BPM | DIASTOLIC BLOOD PRESSURE: 90 MMHG | TEMPERATURE: 97 F | BODY MASS INDEX: 33.05 KG/M2

## 2020-12-08 DIAGNOSIS — Z12.31 ENCOUNTER FOR SCREENING MAMMOGRAM FOR MALIGNANT NEOPLASM OF BREAST: ICD-10-CM

## 2020-12-08 DIAGNOSIS — Z12.4 CERVICAL CANCER SCREENING: ICD-10-CM

## 2020-12-08 DIAGNOSIS — Z13.820 SCREENING FOR OSTEOPOROSIS: ICD-10-CM

## 2020-12-08 DIAGNOSIS — R03.0 ELEVATED BLOOD PRESSURE READING: ICD-10-CM

## 2020-12-08 DIAGNOSIS — D25.9 UTERINE LEIOMYOMA, UNSPECIFIED LOCATION: ICD-10-CM

## 2020-12-08 DIAGNOSIS — Z00.00 PHYSICAL EXAM: Primary | ICD-10-CM

## 2020-12-08 DIAGNOSIS — E55.9 VITAMIN D DEFICIENCY: ICD-10-CM

## 2020-12-08 LAB
SL AMB  POCT GLUCOSE, UA: NORMAL
SL AMB LEUKOCYTE ESTERASE,UA: NORMAL
SL AMB POCT BILIRUBIN,UA: NORMAL
SL AMB POCT BLOOD,UA: NORMAL
SL AMB POCT CLARITY,UA: CLEAR
SL AMB POCT COLOR,UA: YELLOW
SL AMB POCT KETONES,UA: NORMAL
SL AMB POCT NITRITE,UA: NORMAL
SL AMB POCT PH,UA: 5
SL AMB POCT SPECIFIC GRAVITY,UA: 1.02
SL AMB POCT URINE PROTEIN: NORMAL
SL AMB POCT UROBILINOGEN: NORMAL

## 2020-12-08 PROCEDURE — 81003 URINALYSIS AUTO W/O SCOPE: CPT | Performed by: FAMILY MEDICINE

## 2020-12-08 PROCEDURE — 1036F TOBACCO NON-USER: CPT | Performed by: FAMILY MEDICINE

## 2020-12-08 PROCEDURE — 3008F BODY MASS INDEX DOCD: CPT | Performed by: FAMILY MEDICINE

## 2020-12-08 PROCEDURE — 99396 PREV VISIT EST AGE 40-64: CPT | Performed by: FAMILY MEDICINE

## 2020-12-11 ENCOUNTER — HOSPITAL ENCOUNTER (OUTPATIENT)
Dept: RADIOLOGY | Facility: HOSPITAL | Age: 60
Discharge: HOME | End: 2020-12-11
Attending: FAMILY MEDICINE
Payer: COMMERCIAL

## 2020-12-11 VITALS — WEIGHT: 198 LBS | BODY MASS INDEX: 32.99 KG/M2 | HEIGHT: 65 IN

## 2020-12-11 DIAGNOSIS — Z13.820 SCREENING FOR OSTEOPOROSIS: ICD-10-CM

## 2020-12-11 DIAGNOSIS — Z12.31 ENCOUNTER FOR SCREENING MAMMOGRAM FOR MALIGNANT NEOPLASM OF BREAST: ICD-10-CM

## 2020-12-11 DIAGNOSIS — E55.9 VITAMIN D DEFICIENCY: ICD-10-CM

## 2020-12-11 PROCEDURE — 77063 BREAST TOMOSYNTHESIS BI: CPT

## 2020-12-11 PROCEDURE — 77080 DXA BONE DENSITY AXIAL: CPT

## 2020-12-11 PROCEDURE — 77067 SCR MAMMO BI INCL CAD: CPT

## 2020-12-12 LAB
CYTOLOGIST CVX/VAG CYTO: NORMAL
DX ICD CODE: NORMAL
HPV I/H RISK 1 DNA CVX QL PROBE+SIG AMP: NEGATIVE
HPV LOW RISK DNA CVX QL PROBE+SIG AMP: NEGATIVE
OTHER STN SPEC: NORMAL
OTHER STN SPEC: NORMAL
PATH REPORT.FINAL DX SPEC: NORMAL
SL AMB NOTE:: NORMAL
SL AMB SPECIMEN ADEQUACY: NORMAL

## 2020-12-15 ENCOUNTER — TELEMEDICINE (OUTPATIENT)
Dept: FAMILY MEDICINE CLINIC | Facility: CLINIC | Age: 60
End: 2020-12-15
Payer: COMMERCIAL

## 2020-12-15 DIAGNOSIS — M85.80 OSTEOPENIA, UNSPECIFIED LOCATION: Primary | ICD-10-CM

## 2020-12-15 DIAGNOSIS — D25.9 UTERINE LEIOMYOMA, UNSPECIFIED LOCATION: ICD-10-CM

## 2020-12-15 DIAGNOSIS — N83.201 RIGHT OVARIAN CYST: ICD-10-CM

## 2020-12-15 PROCEDURE — 99213 OFFICE O/P EST LOW 20 MIN: CPT | Performed by: FAMILY MEDICINE

## 2020-12-22 PROBLEM — M85.80 OSTEOPENIA: Status: ACTIVE | Noted: 2020-12-22

## 2021-01-11 ENCOUNTER — TELEMEDICINE (OUTPATIENT)
Dept: FAMILY MEDICINE CLINIC | Facility: CLINIC | Age: 61
End: 2021-01-11
Payer: COMMERCIAL

## 2021-01-11 VITALS — SYSTOLIC BLOOD PRESSURE: 133 MMHG | DIASTOLIC BLOOD PRESSURE: 87 MMHG | HEART RATE: 105 BPM

## 2021-01-11 DIAGNOSIS — Z20.822 CLOSE EXPOSURE TO COVID-19 VIRUS: Primary | ICD-10-CM

## 2021-01-11 DIAGNOSIS — Z20.822 CLOSE EXPOSURE TO COVID-19 VIRUS: ICD-10-CM

## 2021-01-11 PROCEDURE — 1036F TOBACCO NON-USER: CPT | Performed by: FAMILY MEDICINE

## 2021-01-11 PROCEDURE — 99213 OFFICE O/P EST LOW 20 MIN: CPT | Performed by: FAMILY MEDICINE

## 2021-01-11 PROCEDURE — U0005 INFEC AGEN DETEC AMPLI PROBE: HCPCS | Performed by: FAMILY MEDICINE

## 2021-01-11 PROCEDURE — U0003 INFECTIOUS AGENT DETECTION BY NUCLEIC ACID (DNA OR RNA); SEVERE ACUTE RESPIRATORY SYNDROME CORONAVIRUS 2 (SARS-COV-2) (CORONAVIRUS DISEASE [COVID-19]), AMPLIFIED PROBE TECHNIQUE, MAKING USE OF HIGH THROUGHPUT TECHNOLOGIES AS DESCRIBED BY CMS-2020-01-R: HCPCS | Performed by: FAMILY MEDICINE

## 2021-01-11 NOTE — ASSESSMENT & PLAN NOTE
Exposure to son, Helena Marcus (), on 9/8/2047  Son developed sxs next day- tested 1/7/2021--came back with +result 1/9/2021  Pt currently asymptomatic  covid test ordered-to be done today at [facility]  Follow-up in 2 days--sooner prn

## 2021-01-12 ENCOUNTER — TELEPHONE (OUTPATIENT)
Dept: FAMILY MEDICINE CLINIC | Facility: CLINIC | Age: 61
End: 2021-01-12

## 2021-01-12 LAB — SARS-COV-2 RNA SPEC QL NAA+PROBE: NOT DETECTED

## 2021-01-12 NOTE — TELEPHONE ENCOUNTER
----- Message from Belinda Kiran MD sent at 1/12/2021 11:01 AM EST -----  Please inform covid test for both pt and  negative

## 2021-02-06 ENCOUNTER — TELEMEDICINE (OUTPATIENT)
Dept: FAMILY MEDICINE CLINIC | Facility: CLINIC | Age: 61
End: 2021-02-06
Payer: COMMERCIAL

## 2021-02-06 ENCOUNTER — TELEPHONE (OUTPATIENT)
Dept: OTHER | Facility: OTHER | Age: 61
End: 2021-02-06

## 2021-02-06 DIAGNOSIS — Z20.822 EXPOSURE TO COVID-19 VIRUS: Primary | ICD-10-CM

## 2021-02-06 PROCEDURE — 99213 OFFICE O/P EST LOW 20 MIN: CPT | Performed by: FAMILY MEDICINE

## 2021-02-06 PROCEDURE — 1036F TOBACCO NON-USER: CPT | Performed by: FAMILY MEDICINE

## 2021-02-06 NOTE — TELEPHONE ENCOUNTER
Patient request callback to obtain referral to be tested for covid due to her  just tested positive / she received her 1st covid vaccine shot on 01/20     Paged out to Dr Sherice De La Garza

## 2021-02-07 NOTE — PROGRESS NOTES
COVID-19 Virtual Visit     Assessment/Plan:    Problem List Items Addressed This Visit     None      Visit Diagnoses     Exposure to COVID-19 virus    -  Primary    Relevant Orders    Novel Coronavirus (Covid-19),PCR [facility] - Collected at Mobile Vans or [facility]         Disposition:     I recommended self-quarantine for 10 days and to watch for symptoms until 14 days after exposure  If patient were to develop symptoms, they should self isolate and call our office for further guidance  I have spent 15 minutes directly with the patient  Greater than 50% of this time was spent in counseling/coordination of care regarding: instructions for management and impressions  Encounter provider Mikey Roper MD    Provider located at [address] [place] [address]  [phone]    Recent Visits  No visits were found meeting these conditions  Showing recent visits within past 7 days and meeting all other requirements     Today's Visits  Date Type Provider Dept   02/06/21 Telemedicine Mikey Roper MD [facility]   Showing today's visits and meeting all other requirements     Future Appointments  No visits were found meeting these conditions  Showing future appointments within next 150 days and meeting all other requirements        Patient agrees to participate in a virtual check in via telephone or video visit instead of presenting to the office to address urgent/immediate medical needs  Patient is aware this is a billable service  After connecting through Telephone, the patient was identified by name and date of birth  Ananth White was informed that this was a telemedicine visit and that the exam was being conducted confidentially over secure lines  My office door was closed  No one else was in the room  Ananth White acknowledged consent and understanding of privacy and security of the telemedicine visit   I informed the patient that I have reviewed her record in [address] [place] [address] and presented the opportunity for her to ask any questions regarding the visit today  The patient agreed to participate  It was my intent to perform this visit via video technology but the patient was not able to do a video connection so the visit was completed via audio telephone only  Subjective: Sam Glasgow is a 62 y o  female who is concerned about COVID-19  Patient is currently asymptomatic  Patient denies fever, chills, fatigue, malaise, congestion, rhinorrhea, sore throat, anosmia, loss of taste, cough, shortness of breath, chest tightness, abdominal pain, nausea, vomiting, diarrhea, myalgias and headaches  Date of exposure: 1/31/2021    Exposure:   Contact with a person who is under investigation (PUI) for or who is positive for COVID-19 within the last 14 days?: Yes    Hospitalized recently for fever and/or lower respiratory symptoms?: No      Currently a healthcare worker that is involved in direct patient care?: No      Works in a special setting where the risk of COVID-19 transmission may be high? (this may include long-term care, correctional and snf facilities; homeless shelters; assisted-living facilities and group homes ): No      Resident in a special setting where the risk of COVID-19 transmission may be high? (this may include long-term care, correctional and snf facilities; homeless shelters; assisted-living facilities and group homes ): No      Lab Results   Component Value Date    Vance Soto Not Detected 01/11/2021     Past Medical History:   Diagnosis Date    Anxiety     Borderline hyperlipidemia     Cancer ([facility] [address] )     Basal Cell skin ca    Ovarian cyst     Uterine fibroid     Vertigo      Past Surgical History:   Procedure Laterality Date    HERNIA REPAIR  2011    ORIF WRIST FRACTURE Right 11/14/2018    Procedure: OPEN REDUCTION W/ INTERNAL FIXATION (ORIF) RADIUS / ULNA (WRIST);   Surgeon: Leif Aguilar DO;  Location: [facility] OR;  Service: Orthopedics     Current Outpatient Medications   Medication Sig Dispense Refill    Ascorbic Acid ([employer] VITAMIN C) 500 MG CHEW Chew 500 mg daily        calcium acetate (PHOSLO) 667 mg capsule Take 1,334 mg by mouth 3 (three) times a day with meals      cholecalciferol (VITAMIN D3) 1,000 units tablet Take 1,000 Units by mouth daily      Multiple Vitamin (DAILY VITAMINS PO) Take by mouth daily        nystatin (MYCOSTATIN) cream Apply topically 2 (two) times a day 30 g 0    predniSONE 20 mg tablet 2 tabs po x2d, 1 tab po x2d, 1/2 tab po x2d-take w food 7 tablet 0    zinc oxide (Desitin) 13 % cream Apply topically 2 (two) times a day       No current facility-administered medications for this visit  No Known Allergies    Review of Systems   Constitutional: Negative for chills, fatigue and fever  HENT: Negative for congestion, rhinorrhea and sore throat  Eyes: Negative for discharge  Respiratory: Negative for cough, chest tightness and shortness of breath  Cardiovascular: Negative for chest pain and palpitations  Gastrointestinal: Negative for abdominal pain, diarrhea, nausea and vomiting  Musculoskeletal: Negative for arthralgias, joint swelling and myalgias  Neurological: Negative for numbness and headaches  Psychiatric/Behavioral: The patient is not nervous/anxious  Objective: There were no vitals filed for this visit  Physical Exam  VIRTUAL VISIT DISCLAIMER    So Mayorga acknowledges that she has consented to an online visit or consultation  She understands that the online visit is based solely on information provided by her, and that, in the absence of a face-to-face physical evaluation by the physician, the diagnosis she receives is both limited and provisional in terms of accuracy and completeness  This is not intended to replace a full medical face-to-face evaluation by the physician  So Mayorga understands and accepts these terms

## 2021-02-08 ENCOUNTER — TELEPHONE (OUTPATIENT)
Dept: FAMILY MEDICINE CLINIC | Facility: CLINIC | Age: 61
End: 2021-02-08

## 2021-02-08 DIAGNOSIS — Z20.822 EXPOSURE TO COVID-19 VIRUS: ICD-10-CM

## 2021-02-08 PROCEDURE — U0003 INFECTIOUS AGENT DETECTION BY NUCLEIC ACID (DNA OR RNA); SEVERE ACUTE RESPIRATORY SYNDROME CORONAVIRUS 2 (SARS-COV-2) (CORONAVIRUS DISEASE [COVID-19]), AMPLIFIED PROBE TECHNIQUE, MAKING USE OF HIGH THROUGHPUT TECHNOLOGIES AS DESCRIBED BY CMS-2020-01-R: HCPCS | Performed by: FAMILY MEDICINE

## 2021-02-08 PROCEDURE — U0005 INFEC AGEN DETEC AMPLI PROBE: HCPCS | Performed by: FAMILY MEDICINE

## 2021-02-08 NOTE — TELEPHONE ENCOUNTER
Dr Claudio Yuan:    Patient is being tested for COVID today due to exposure from 's positive results  She is scheduled for her 2nd vaccine on 2/17  She wanted to know if she could still go get her vaccine even through she had exposure? Please c/b to discuss further

## 2021-02-09 LAB — SARS-COV-2 RNA RESP QL NAA+PROBE: NEGATIVE

## 2021-02-09 NOTE — TELEPHONE ENCOUNTER
Patients husbands test came back positive this past Saturday , patient is not showing any sxs but went today to get tested    Tc/cma

## 2021-02-19 NOTE — TELEPHONE ENCOUNTER
Dr Claudio Yuan:    Patient needs a note to return to work on 2/22/2021  Please email note to [email or web address]  [email or web address] when complete

## 2022-01-03 ENCOUNTER — HOSPITAL ENCOUNTER (OUTPATIENT)
Dept: RADIOLOGY | Facility: HOSPITAL | Age: 62
Discharge: HOME | End: 2022-01-03
Attending: FAMILY MEDICINE
Payer: COMMERCIAL

## 2022-01-03 VITALS — HEIGHT: 65 IN | WEIGHT: 185 LBS | BODY MASS INDEX: 30.82 KG/M2

## 2022-01-03 DIAGNOSIS — Z12.31 ENCOUNTER FOR SCREENING MAMMOGRAM FOR MALIGNANT NEOPLASM OF BREAST: ICD-10-CM

## 2022-01-03 PROCEDURE — 77063 BREAST TOMOSYNTHESIS BI: CPT

## 2022-01-03 PROCEDURE — 77067 SCR MAMMO BI INCL CAD: CPT

## 2022-01-05 ENCOUNTER — TELEPHONE (OUTPATIENT)
Dept: FAMILY MEDICINE CLINIC | Facility: CLINIC | Age: 62
End: 2022-01-05

## 2022-01-06 NOTE — TELEPHONE ENCOUNTER
Thank you--please inform pt that I sent rx order for the Shingles vaccine to [facility] pharmacy

## 2022-01-06 NOTE — TELEPHONE ENCOUNTER
Please first check if pt has had cvoid vaccine and if so enter dates to be sure enough time alloted before Shingles vaccine is given,

## 2022-01-19 ENCOUNTER — OFFICE VISIT (OUTPATIENT)
Dept: OTOLARYNGOLOGY | Facility: CLINIC | Age: 62
End: 2022-01-19
Payer: COMMERCIAL

## 2022-01-19 ENCOUNTER — OFFICE VISIT (OUTPATIENT)
Dept: AUDIOLOGY | Facility: CLINIC | Age: 62
End: 2022-01-19
Payer: COMMERCIAL

## 2022-01-19 VITALS — TEMPERATURE: 97.5 F | BODY MASS INDEX: 30.82 KG/M2 | WEIGHT: 185 LBS | HEIGHT: 65 IN

## 2022-01-19 DIAGNOSIS — H92.03 OTALGIA, BILATERAL: ICD-10-CM

## 2022-01-19 DIAGNOSIS — H90.3 SENSORINEURAL HEARING LOSS (SNHL), BILATERAL: Primary | ICD-10-CM

## 2022-01-19 DIAGNOSIS — H90.3 SENSORY HEARING LOSS, BILATERAL: Primary | ICD-10-CM

## 2022-01-19 PROCEDURE — 1036F TOBACCO NON-USER: CPT | Performed by: NURSE PRACTITIONER

## 2022-01-19 PROCEDURE — 92567 TYMPANOMETRY: CPT | Performed by: AUDIOLOGIST

## 2022-01-19 PROCEDURE — 92557 COMPREHENSIVE HEARING TEST: CPT | Performed by: AUDIOLOGIST

## 2022-01-19 PROCEDURE — 99204 OFFICE O/P NEW MOD 45 MIN: CPT | Performed by: NURSE PRACTITIONER

## 2022-01-19 PROCEDURE — 3008F BODY MASS INDEX DOCD: CPT | Performed by: NURSE PRACTITIONER

## 2022-01-19 NOTE — PROGRESS NOTES
ENT HEARING EVALUATION    Name:  Catrcaho Jacob  :  1960  Age:  62 y o  Date of Evaluation: 22     History: ENT Audiogram  Reason for visit: Catracho Jacob  was seen today at the request of SISSY Craig for an evaluation of hearing as part of their initial ENT visit  EVALUATION:    Otoscopic Evaluation:   Right Ear: Clear and healthy ear canal and tympanic membrane   Left Ear: Clear and healthy ear canal and tympanic membrane    Tympanometry:   Right: Type A - normal middle ear pressure and compliance   Left: Type A - normal middle ear pressure and compliance    Audiogram Results:  Pure tone testing revealed normal hearing at 250-500 Hz sloping to mild sensorineural hearing loss in the  right ear and a mild sloping to moderately severe mixed hearing loss in the left  ear  SRT and PTA are in agreement indicating good test reliability  Word recognition scores were excellent bilaterally in quiet  This degree of hearing loss can interfere with daily communication and make listening in noise or listening from a distance very difficult  I feel that Yao Palacios would be an excellent candidate for bilateral hearing aids  Reading material about communication strategies to help patients with hearing loss was given to Yao Palacios  *see attached audiogram      RECOMMENDATIONS:  Consult ENT   Hearing aid evaluation        Dawood Chua, AuD , [address] [facility] #[id number]

## 2022-01-19 NOTE — PATIENT INSTRUCTIONS
TMJ syndrome - soft diet, jaw rest, NSAIDs, warm compresses, massage, physical therapy, oral appliance, or consultation with oral surgeon     Hydrocortisone cream at bedtime to both ears for one month then as needed  Decrease use of q-tips  Consider hearing aid evaluation  Repeat hearing test one year, sooner if hearing changes

## 2022-01-19 NOTE — PROGRESS NOTES
Assessment/Plan:    Sensorineural hearing loss (SNHL), bilateral  Reviewed recurrent otalgia and possible causes  Discussed ETD, dental changes, vs ear processes  No significant findings on exam today other than evidence of bruxism  Discussed TMJ syndrome - soft diet, jaw rest, NSAIDs, warm compresses, massage, physical therapy, oral appliance, or consultation with oral surgeon  For ear itching may use Hydrocortisone cream at bedtime to both ears for one month then as needed  Decrease use of q-tips    Audiogram today indicating bilateral mild to moderate SNHL, noted left asymmetry at 250 K (conductive), also SNHL asymmetry on left at 8K  Type A tymps  100% word discrimination  Discussed options for hearing asymmetry including imaging Ct scan vs MRI brain with IAC to rule out otosclerosis vs acoustic neuroma  After discussion agree to repeat audiogram in one year, sooner if hearing changes  If hearing continue to to become more asymmetrical over time will proceed with imaging  Offered options for bilateral SNHL including acceptance, lifestyle changes such as moving closer to those who are speaking, or hearing amplification  She would qualify for hearing amplification based on audiogram   Discussed hearing aid options including facilities to obtain a hearing aid from as well as costs and benefits  Discussed that quality of life may improve with hearing amplification and she agreed to consider  May proceed with hearing aid evaluation  Pt will follow up with our office annually, sooner if symptoms worsen             Diagnoses and all orders for this visit:    Sensorineural hearing loss (SNHL), bilateral  -     Ambulatory referral to Audiology    Otalgia, bilateral  -     Ambulatory referral to Audiology          Subjective:      Patient ID: Angelica Bonds is a 62 y o  female  Presents today as a new patient due to hearing loss  Gradually worsening  Asks people to repeat themselves often  History ear infections  Worse with mask use  Difficulty distinguishing words  Last hearing test over ten years ago  Occasional pain in ears  Worse with being outside in cold  No otorrhea  Uses q-tips  No history ear surgery  The following portions of the patient's history were reviewed and updated as appropriate: allergies, current medications, past family history, past medical history, past social history, past surgical history and problem list     Review of Systems   Constitutional: Negative  HENT: Positive for ear pain and hearing loss  Negative for congestion, ear discharge, nosebleeds, postnasal drip, rhinorrhea, sinus pressure, sinus pain, sore throat, tinnitus and voice change  Eyes: Negative  Respiratory: Negative for chest tightness and shortness of breath  Cardiovascular: Negative  Gastrointestinal: Negative  Endocrine: Negative  Musculoskeletal: Negative  Skin: Negative for color change  Neurological: Negative for dizziness, numbness and headaches  Psychiatric/Behavioral: Negative  Objective:      Temp 97 5 °F (36 4 °C) (Temporal)   Ht 5' 5 25" (1 657 m)   Wt 83 9 kg (185 lb)   LMP 11/14/2017   BMI 30 55 kg/m²          Physical Exam  Constitutional:       Appearance: She is well-developed  HENT:      Head: Normocephalic  Right Ear: Hearing, tympanic membrane, ear canal and external ear normal  No decreased hearing noted  No drainage or tenderness  Tympanic membrane is not perforated or erythematous  Left Ear: Hearing, tympanic membrane, ear canal and external ear normal  No decreased hearing noted  No drainage or tenderness  Tympanic membrane is not perforated or erythematous  Nose: Nose normal  No nasal deformity or septal deviation  Mouth/Throat:      Mouth: Mucous membranes are not pale and not dry  No oral lesions  Dentition: Normal dentition  Pharynx: Uvula midline  No oropharyngeal exudate     Neck:      Trachea: No tracheal deviation  Cardiovascular:      Rate and Rhythm: Normal rate  Pulmonary:      Effort: Pulmonary effort is normal  No accessory muscle usage or respiratory distress  Musculoskeletal:      Right shoulder: Normal range of motion  Cervical back: Full passive range of motion without pain, normal range of motion and neck supple  Lymphadenopathy:      Cervical: No cervical adenopathy  Skin:     General: Skin is warm and dry  Neurological:      Mental Status: She is alert and oriented to person, place, and time  Cranial Nerves: No cranial nerve deficit  Sensory: No sensory deficit  Psychiatric:         Behavior: Behavior is cooperative

## 2022-01-19 NOTE — ASSESSMENT & PLAN NOTE
Reviewed recurrent otalgia and possible causes  Discussed ETD, dental changes, vs ear processes  No significant findings on exam today other than evidence of bruxism  Discussed TMJ syndrome - soft diet, jaw rest, NSAIDs, warm compresses, massage, physical therapy, oral appliance, or consultation with oral surgeon  For ear itching may use Hydrocortisone cream at bedtime to both ears for one month then as needed  Decrease use of q-tips    Audiogram today indicating bilateral mild to moderate SNHL, noted left asymmetry at 250 K (conductive), also SNHL asymmetry on left at 8K  Type A tymps  100% word discrimination  Discussed options for hearing asymmetry including imaging Ct scan vs MRI brain with IAC to rule out otosclerosis vs acoustic neuroma  After discussion agree to repeat audiogram in one year, sooner if hearing changes  If hearing continue to to become more asymmetrical over time will proceed with imaging  Offered options for bilateral SNHL including acceptance, lifestyle changes such as moving closer to those who are speaking, or hearing amplification  She would qualify for hearing amplification based on audiogram   Discussed hearing aid options including facilities to obtain a hearing aid from as well as costs and benefits  Discussed that quality of life may improve with hearing amplification and she agreed to consider  May proceed with hearing aid evaluation        Pt will follow up with our office annually, sooner if symptoms worsen

## 2022-01-21 ENCOUNTER — CLINICAL SUPPORT (OUTPATIENT)
Dept: FAMILY MEDICINE CLINIC | Facility: CLINIC | Age: 62
End: 2022-01-21
Payer: COMMERCIAL

## 2022-01-21 VITALS — TEMPERATURE: 97.8 F

## 2022-01-21 DIAGNOSIS — Z23 NEED FOR INFLUENZA VACCINATION: Primary | ICD-10-CM

## 2022-01-21 PROCEDURE — 90682 RIV4 VACC RECOMBINANT DNA IM: CPT

## 2022-01-21 PROCEDURE — 90471 IMMUNIZATION ADMIN: CPT

## 2022-02-03 ENCOUNTER — OFFICE VISIT (OUTPATIENT)
Dept: AUDIOLOGY | Facility: CLINIC | Age: 62
End: 2022-02-03

## 2022-02-03 DIAGNOSIS — H90.3 SENSORY HEARING LOSS, BILATERAL: Primary | ICD-10-CM

## 2022-02-10 NOTE — PROGRESS NOTES
Hearing Aid Evaluation  Name:  Cande Norris  :  1960  Age:  62 y o  Date of Evaluation: 02/10/22     Audiologic Results: Audiologic testing was performed on 22, testing revealed a normal sloping mild sensorineural hearing loss in the right ear and a mild sloping to moderately severe mixed hearing loss in the left ear  Amplification is recommended binaurally    Hearing Aid Evaluation:  Hearing aid styles, technology, and price were discussed with the patient  Possible hearing aid options, programs, and accessories were discussed  Pricing options and technology levels were discussed to determine the appropriate device to recommend  Recommendation/Quote: The first hearing aid recommendation is  Oticon MORE 3  The second hearing aid recommendation is  Oticon MORE 2  See attached quote sheet*    Selection:   The patient selected the [address] [facility] 3 hearing aids  Color: [id number]    size: Right 2 / Left 285   Dome size: 8 mm   Earmold Impressions: NA   Accessories:     The hearing aids were ordered          Bird Coats, Brooke , [address] [facility] #[id number]

## 2022-02-15 ENCOUNTER — DOCUMENTATION (OUTPATIENT)
Dept: AUDIOLOGY | Facility: CLINIC | Age: 62
End: 2022-02-15

## 2022-02-15 DIAGNOSIS — H90.3 SENSORY HEARING LOSS, BILATERAL: Primary | ICD-10-CM

## 2022-02-15 NOTE — PROGRESS NOTES
Progress Note    Name:  Sathish Davis  :  1960  Age:  62 y o  Date of Evaluation: 02/15/22     The hearing aids have arrived  They were assembled and paperwork was scanned into Epic      Apointment for HAF is 22    Brooke Anand , CCC-A  Clinical Audiologist

## 2022-02-22 ENCOUNTER — OFFICE VISIT (OUTPATIENT)
Dept: AUDIOLOGY | Facility: CLINIC | Age: 62
End: 2022-02-22
Payer: COMMERCIAL

## 2022-02-22 DIAGNOSIS — H90.3 SENSORY HEARING LOSS, BILATERAL: Primary | ICD-10-CM

## 2022-02-22 PROCEDURE — V5160 DISPENSING FEE BINAURAL: HCPCS | Performed by: AUDIOLOGIST

## 2022-02-22 PROCEDURE — V5261 HEARING AID, DIGIT, BIN, BTE: HCPCS | Performed by: AUDIOLOGIST

## 2022-02-23 ENCOUNTER — DOCUMENTATION (OUTPATIENT)
Dept: AUDIOLOGY | Facility: CLINIC | Age: 62
End: 2022-02-23

## 2022-02-23 DIAGNOSIS — H90.3 SENSORY HEARING LOSS, BILATERAL: Primary | ICD-10-CM

## 2022-02-23 NOTE — PROGRESS NOTES
Hearing Aid Fitting    Name:  Steph Harding  :  1960  Age:  62 y o  Date of Evaluation: 22     Steph Harding was seen today to be fit with new hearing aids  Patient is fit with:  Make: Phonak  Model:  MORE 3 miniRITE hearing aids   Right serial number: [id number]  Left serial number: [id number]  Warranty date: 3/11/25 (Loss/Damage and repair)  Ear mold Battery  Dome   Right  + 2/85 6 mm   Left  + 2/85 8 mm     The hearing aids were adjusted based on the patient's most recent audiogram and patient comfort  Patient noted good sound quality, and was happy with the fitting  Care and cleaning of the hearing aids was reviewed  Domes, filters, and batteries and user manual were reviewed with the patient  Insertion and removal of the hearing aids was done  The patient practiced insertion and removal of the devices in the office, they demonstrated excellent ability to manipulate the hearing aids  Telephone use was reviewed with the patient  The patient expressed satisfaction with the hearing aids  Recommendation:   Follow up in two weeks         Brooke Patel , [address] [facility] #[id number]

## 2022-02-23 NOTE — PROGRESS NOTES
Progress Note    Name:  Levon Cedillo  :  1960  Age:  62 y o  Date of Evaluation: 22     Hearing aid payment receipt  Patient paid 1900 00 half of the 3800 00 cost of binaural hearing aids  She is aware that she is responsible for the remaining 1900 00        Brooke Berman , [address] [facility] #[id number]

## 2022-03-08 ENCOUNTER — OFFICE VISIT (OUTPATIENT)
Dept: AUDIOLOGY | Facility: CLINIC | Age: 62
End: 2022-03-08

## 2022-03-08 DIAGNOSIS — H90.3 SENSORY HEARING LOSS, BILATERAL: Primary | ICD-10-CM

## 2022-03-08 NOTE — PROGRESS NOTES
Hearing Aid Visit:    Name:  Angelica Bonds  :  1960  Age:  62 y o  Date of Evaluation: 22     Angelica Bonds is being seen for a hearing aid visit  Patient is fit with:  Make: Oticon  Model:  MORE 3 miniRITE hearing aids   Right serial number: [id number]  Left serial number: [id number]  Warranty date: 3/11/25 (Loss/Damage and repair)       Ear mold Battery  Dome   Right  + 2/85 8 mm   Left  + 2/85 8 mm     We changed the dome in right ear to 8 mm open today because the 6 mmdome did not feel secure in the ear canal and was slipping out  Patient reported that things are good but sounding a bit loud and too sharp  Action:  Both hearing aids were brought down to adaptation level 2  Both hearing aids were adjusted to take away a slight amount of high frequencies to sound less sharp  Both domes were changed  Recommendations:   Hearing aid visit in 4 weeks       Brooke Vanessa , CCC-A  Clinical Audiologist

## 2022-04-12 ENCOUNTER — OFFICE VISIT (OUTPATIENT)
Dept: AUDIOLOGY | Facility: CLINIC | Age: 62
End: 2022-04-12

## 2022-04-12 DIAGNOSIS — H90.3 SENSORY HEARING LOSS, BILATERAL: Primary | ICD-10-CM

## 2022-04-12 NOTE — PROGRESS NOTES
Hearing Aid Visit:    Name:  John Davila  :  1960  Age:  62 y o  Date of Evaluation: 22     John Davila is being seen for a hearing aid visit  Device Information    Hearing Aid Fitting Date: 22     Left Device Right Device   Hearing Aid Make: Marjo Pole   Hearing Aid Model: MORE 3 MORE 3   Serial Number: [id number]   Repair Warranty Expiration Date: 3/11/25 3/11/25   Loss/Damage Warranty Expiration Date:  3/11/25 3/11/25    Length: 2 2    Output: 85 85   Wax System: Pro Wax Pro WAx   Dome Size/Style: 10 mm open 10 mm open   Battery: Lithium-ion Rechargeable Lithium-ion Rechargeable   Earmold Serial Number: N/A EBN/A   Gearline Rideau Date:  N/A N/A      Serial Number: [id number]  Accessories: N/A    Patient is fit with:  Make: Oticon  Model:  MORE 3 miniRITE hearing aids   Right serial number: [id number]  Left serial number: [id number]  Warranty date: 3/11/25 (Loss/Damage and repair)       Ear mold Battery  Dome   Right  + 2/85 8 mm   Left  + 2/85 8 mm     Genella Service reports that she is hearing some static  A listening check revealed no static and clear crisp sounds  Action:  Both hearing aids were cleaned and suctioned and were re-programmed to help with overall report of too much loudness  The domes were changed to 10 mm open  Instead of 8 mm for more stability in the ear canals      Recommendations:   Hearing aid visit in 1 month to monitor changes     Brooke Stinson CCC-A  Clinical Audiologist

## 2022-05-11 ENCOUNTER — OFFICE VISIT (OUTPATIENT)
Dept: AUDIOLOGY | Facility: CLINIC | Age: 62
End: 2022-05-11

## 2022-05-11 DIAGNOSIS — H90.3 SENSORY HEARING LOSS, BILATERAL: Primary | ICD-10-CM

## 2022-05-11 NOTE — PROGRESS NOTES
Hearing Aid Visit:    Name:  Maryam Urias  :  1960  Age:  62 y o  Date of Evaluation: 22     Maryam Urias is being seen for a hearing aid visit  Device Information     Hearing Aid Fitting Date: 22       Left Device Right Device   Hearing Aid Make: Bhavna Griffin   Hearing Aid Model: MORE 3 MORE 3   Serial Number: [id number]   Repair Warranty Expiration Date: 3/11/25 3/11/25   Loss/Damage Warranty Expiration Date:  3/11/25 3/11/25    Length: 2 2    Output: 85 85   Wax System: Pro Wax Pro WAx   Dome Size/Style: 10 mm open 10 mm open   Battery: Lithium-ion Rechargeable Lithium-ion Rechargeable   E[employer]old Serial Number: N/A EBN/A   [employer]old Warranty Date:  N/A N/A       Serial Number: [id number]  Accessories: N/A       Patient reported that she is doing well with the changes that were made at last hearing aid visit  The larger domes fit better  She is not getting any   "distortion" any more which we are attributing to the smaller domes moving aroundn her ear canals  Action:  Both hearing aids were cleaned and suctioned  Both hearing aids are working well and sound good via listening check      Recommendations:   Hearing aid check in 4 months    Brooke Seth CCC-A  Clinical Audiologist

## 2022-06-08 ENCOUNTER — OFFICE VISIT (OUTPATIENT)
Dept: FAMILY MEDICINE CLINIC | Facility: CLINIC | Age: 62
End: 2022-06-08
Payer: COMMERCIAL

## 2022-06-08 VITALS
TEMPERATURE: 98.6 F | SYSTOLIC BLOOD PRESSURE: 130 MMHG | HEART RATE: 82 BPM | BODY MASS INDEX: 31.92 KG/M2 | HEIGHT: 65 IN | RESPIRATION RATE: 16 BRPM | DIASTOLIC BLOOD PRESSURE: 82 MMHG | WEIGHT: 191.6 LBS

## 2022-06-08 DIAGNOSIS — Z00.00 PHYSICAL EXAM, ANNUAL: Primary | ICD-10-CM

## 2022-06-08 DIAGNOSIS — R10.2 PELVIC PAIN: ICD-10-CM

## 2022-06-08 DIAGNOSIS — Z12.4 CERVICAL CANCER SCREENING: ICD-10-CM

## 2022-06-08 DIAGNOSIS — D25.9 UTERINE LEIOMYOMA, UNSPECIFIED LOCATION: ICD-10-CM

## 2022-06-08 DIAGNOSIS — E78.5 BORDERLINE HYPERLIPIDEMIA: ICD-10-CM

## 2022-06-08 LAB
SL AMB  POCT GLUCOSE, UA: NORMAL
SL AMB LEUKOCYTE ESTERASE,UA: NORMAL
SL AMB POCT BILIRUBIN,UA: NORMAL
SL AMB POCT BLOOD,UA: NORMAL
SL AMB POCT CLARITY,UA: CLEAR
SL AMB POCT COLOR,UA: YELLOW
SL AMB POCT KETONES,UA: NORMAL
SL AMB POCT NITRITE,UA: NORMAL
SL AMB POCT PH,UA: 5
SL AMB POCT SPECIFIC GRAVITY,UA: 1.03
SL AMB POCT URINE PROTEIN: NORMAL
SL AMB POCT UROBILINOGEN: NORMAL

## 2022-06-08 PROCEDURE — 81003 URINALYSIS AUTO W/O SCOPE: CPT | Performed by: FAMILY MEDICINE

## 2022-06-08 PROCEDURE — 3725F SCREEN DEPRESSION PERFORMED: CPT | Performed by: FAMILY MEDICINE

## 2022-06-08 PROCEDURE — 99396 PREV VISIT EST AGE 40-64: CPT | Performed by: FAMILY MEDICINE

## 2022-06-08 PROCEDURE — 3008F BODY MASS INDEX DOCD: CPT | Performed by: FAMILY MEDICINE

## 2022-06-08 PROCEDURE — 1036F TOBACCO NON-USER: CPT | Performed by: FAMILY MEDICINE

## 2022-06-08 NOTE — PROGRESS NOTES
FAMILY [facility] HEALTH MAINTENANCE OFFICE VISIT  [facility]    NAME: Laney Goodpasture  AGE: 62 y o  SEX: female  : 1960     DATE: 2022    Assessment and Plan     1  Physical exam, annual  -     POCT urine dip auto non-scope  -     Amylase; Future  -     Lipase; Future  -     CBC; Future  -     Lipid Panel with Direct LDL reflex; Future  -     Comprehensive metabolic panel; Future  -     Magnesium; Future  -     TSH, 3rd generation; Future  -     Hemoglobin A1C; Future    2  Pelvic pain  -     Liquid-based pap, screening  -     US pelvis complete w transvaginal; Future; Expected date: 2022    3  Cervical cancer screening  -     Liquid-based pap, screening    4  Uterine leiomyoma, unspecified location  -     US pelvis complete w transvaginal; Future; Expected date: 2022    5  Borderline hyperlipidemia  -     Amylase; Future  -     Lipase; Future  -     Lipid Panel with Direct LDL reflex; Future  -     Comprehensive metabolic panel; Future  -     TSH, 3rd generation; Future    6  BMI 32 0-32 9,adult  -     Amylase; Future  -     Lipase; Future  -     Lipid Panel with Direct LDL reflex; Future  -     Comprehensive metabolic panel; Future  -     TSH, 3rd generation;  Future  -     Hemoglobin A1C; Future        · Patient Counseling:   · Nutrition: Stressed importance of a well balanced diet, moderation of sodium/saturated fat, caloric balance and sufficient intake of fiber  · Exercise: Stressed the importance of regular exercise with a goal of 150 minutes per week  · Dental Health: Discussed daily flossing and brushing and regular dental visits   · Sexuality: Discussed sexually transmitted infections, use of condoms and prevention of unintended pregnancy  · Alcohol Use:  Recommended moderation of alcohol intake  · Injury Prevention: Discussed Safety Belts, Safety Helmets, and Smoke Detectors    · Immunizations reviewed: Risks and Benefits discussed  · Discussed benefits of:  Colon Cancer Screening, Mammogram , Cervical Cancer screening and Screening labs   BMI Counseling: Body mass index is 32 38 kg/m²  Discussed with patient's BMI with her  The BMI is above normal  Nutrition recommendations include 3-5 servings of fruits/vegetables daily, consuming healthier snacks, decreasing soda and/or juice intake, moderation in carbohydrate intake, increasing intake of lean protein, reducing intake of saturated fat and trans fat and reducing intake of cholesterol  Exercise recommendations include exercising 3-5 times per week and strength training exercises  Chief Complaint     Chief Complaint   Patient presents with    Physical Exam     Pt had had pain near left ovary but pain went away 2 weeks ago       History of Present Illness     HPI    Well Adult Physical   Patient here for a comprehensive physical exam       Diet and Physical Activity  Diet: well balanced diet  Exercise: intermittently      Depression Screen  PHQ-2/9 Depression Screening    Little interest or pleasure in doing things: 0 - not at all  Feeling down, depressed, or hopeless: 0 - not at all  PHQ-2 Score: 0  PHQ-2 Interpretation: Negative depression screen          General Health  Hearing: Normal:  bilateral  Vision: goes for regular eye exams  Dental: regular dental visits    Reproductive Health  Post-menopausal       The following portions of the patient's history were reviewed and updated as appropriate: allergies, current medications, past family history, past medical history, past social history, past surgical history and problem list     Review of Systems     Review of Systems   Constitutional: Positive for fatigue  Negative for fever  Respiratory: Negative  Cardiovascular: Negative  Gastrointestinal: Negative  Musculoskeletal: Positive for arthralgias  Skin: Negative  Allergic/Immunologic: Positive for environmental allergies  Neurological: Negative  Psychiatric/Behavioral: Positive for sleep disturbance  Past Medical History     Past Medical History:   Diagnosis Date    Anxiety     Borderline hyperlipidemia     Cancer ([facility] [address] )     Basal Cell skin ca    Ovarian cyst     Uterine fibroid     Vertigo        Past Surgical History     Past Surgical History:   Procedure Laterality Date    BREAST CYST ASPIRATION  1990    patient does not know    HERNIA REPAIR  2011    ORIF WRIST FRACTURE Right 11/14/2018    Procedure: OPEN REDUCTION W/ INTERNAL FIXATION (ORIF) RADIUS / ULNA (WRIST);   Surgeon: Kelli Maria DO;  Location: [facility];  Service: Orthopedics       Social History     Social History     Socioeconomic History    Marital status: /Civil Union     Spouse name: None    Number of children: None    Years of education: None    Highest education level: None   Occupational History    None   Tobacco Use    Smoking status: Never Smoker    Smokeless tobacco: Never Used   Vaping Use    Vaping Use: Never used   Substance and Sexual Activity    Alcohol use: Yes     Comment: social    Drug use: No    Sexual activity: None   Other Topics Concern    None   Social History Narrative    None     Social Determinants of Health     Financial Resource Strain: Not on file   Food Insecurity: Not on file   Transportation Needs: Not on file   Physical Activity: Not on file   Stress: Not on file   Social Connections: Not on file   Intimate Partner Violence: Not on file   Housing Stability: Not on file       Family History     Family History   Problem Relation Age of Onset    Cancer Mother         does not know what  type    Alzheimer's disease Father     No Known Problems Sister     No Known Problems Brother     No Known Problems Paternal Aunt     No Known Problems Paternal Uncle     No Known Problems Maternal Grandmother     No Known Problems Maternal Grandfather     No Known Problems Paternal Grandmother     No Known Problems Paternal Grandfather     No Known Problems Daughter     No Known Problems Sister     No Known Problems Paternal Aunt     No Known Problems Paternal Aunt     No Known Problems Paternal Aunt     ADD / ADHD Neg Hx     Anesthesia problems Neg Hx     Clotting disorder Neg Hx     Collagen disease Neg Hx     Diabetes Neg Hx     Dislocations Neg Hx     Learning disabilities Neg Hx     Neurological problems Neg Hx     Osteoporosis Neg Hx     Rheumatologic disease Neg Hx     Scoliosis Neg Hx     Vascular Disease Neg Hx        Current Medications       Current Outpatient Medications:     cholecalciferol (VITAMIN D3) 1,000 units tablet, Take 1,000 Units by mouth daily, Disp: , Rfl:     Multiple Vitamin (DAILY VITAMINS PO), Take by mouth daily  , Disp: , Rfl:      Allergies     No Known Allergies    Objective     /82 (BP Location: Left arm, Patient Position: Sitting, Cuff Size: Large)   Pulse 82   Temp 98 6 °F (37 °C)   Resp 16   Ht 5' 4 5" (1 638 m)   Wt 86 9 kg (191 lb 9 6 oz)   LMP 11/14/2017   BMI 32 38 kg/m²      Physical Exam  Vitals and nursing note reviewed  Constitutional:       General: She is not in acute distress  Comments: OW   HENT:      Head: Normocephalic and atraumatic  Eyes:      General: No scleral icterus  Conjunctiva/sclera: Conjunctivae normal    Cardiovascular:      Rate and Rhythm: Normal rate and regular rhythm  Pulmonary:      Effort: Pulmonary effort is normal  No respiratory distress  Breath sounds: Normal breath sounds  Abdominal:      General: Bowel sounds are normal       Palpations: Abdomen is soft  Tenderness: There is no abdominal tenderness  There is no right CVA tenderness or left CVA tenderness  Genitourinary:     General: Normal vulva  Vagina: No vaginal discharge        Comments: No CMT,No v/v lesion  No vaginal d/c  Mild left lower pelvic tenderness-no r/g    Breast exam: Symmetrical, no discrete palpable mass,  No SC or axillary LAD  Musculoskeletal:         General: Normal range of motion  Cervical back: Neck supple  No tenderness  Right lower leg: No edema  Left lower leg: No edema  Skin:     General: Skin is warm and dry  Capillary Refill: Capillary refill takes less than 2 seconds  Coloration: Skin is not jaundiced  Findings: No rash  Neurological:      General: No focal deficit present  Mental Status: She is alert and oriented to person, place, and time  Cranial Nerves: No cranial nerve deficit     Psychiatric:         Mood and Affect: Mood normal             Visual Acuity Screening    Right eye Left eye Both eyes   Without correction: 20/30 20/20 20/20   With correction:          Krystin García MD  [address]

## 2022-06-10 LAB
CYTOLOGIST CVX/VAG CYTO: NORMAL
DX ICD CODE: NORMAL
OTHER STN SPEC: NORMAL
OTHER STN SPEC: NORMAL
PATH REPORT.FINAL DX SPEC: NORMAL
SL AMB NOTE:: NORMAL
SL AMB SPECIMEN ADEQUACY: NORMAL
SL AMB TEST METHODOLOGY: NORMAL

## 2022-06-13 ENCOUNTER — APPOINTMENT (OUTPATIENT)
Dept: LAB | Facility: HOSPITAL | Age: 62
End: 2022-06-13
Payer: COMMERCIAL

## 2022-06-13 ENCOUNTER — HOSPITAL ENCOUNTER (OUTPATIENT)
Dept: RADIOLOGY | Facility: HOSPITAL | Age: 62
Discharge: HOME | End: 2022-06-13
Attending: FAMILY MEDICINE
Payer: COMMERCIAL

## 2022-06-13 DIAGNOSIS — R10.2 PELVIC PAIN: ICD-10-CM

## 2022-06-13 DIAGNOSIS — E78.5 BORDERLINE HYPERLIPIDEMIA: ICD-10-CM

## 2022-06-13 DIAGNOSIS — D25.9 UTERINE LEIOMYOMA, UNSPECIFIED LOCATION: ICD-10-CM

## 2022-06-13 DIAGNOSIS — Z00.00 PHYSICAL EXAM, ANNUAL: ICD-10-CM

## 2022-06-13 LAB
ALBUMIN SERPL BCP-MCNC: 4 G/DL (ref 3.5–5)
ALP SERPL-CCNC: 122 U/L (ref 46–116)
ALT SERPL W P-5'-P-CCNC: 22 U/L (ref 12–78)
AMYLASE SERPL-CCNC: 31 IU/L (ref 25–115)
ANION GAP SERPL CALCULATED.3IONS-SCNC: 9 MMOL/L (ref 4–13)
AST SERPL W P-5'-P-CCNC: 21 U/L (ref 5–45)
BILIRUB SERPL-MCNC: 0.54 MG/DL (ref 0.2–1)
BUN SERPL-MCNC: 16 MG/DL (ref 5–25)
CALCIUM SERPL-MCNC: 9.3 MG/DL (ref 8.3–10.1)
CHLORIDE SERPL-SCNC: 98 MMOL/L (ref 100–108)
CHOLEST SERPL-MCNC: 220 MG/DL
CO2 SERPL-SCNC: 30 MMOL/L (ref 21–32)
CREAT SERPL-MCNC: 0.66 MG/DL (ref 0.6–1.3)
ERYTHROCYTE [DISTWIDTH] IN BLOOD BY AUTOMATED COUNT: 11.9 % (ref 11.6–15.1)
EST. AVERAGE GLUCOSE BLD GHB EST-MCNC: 105 MG/DL
GFR SERPL CREATININE-BSD FRML MDRD: 96 ML/MIN/1.73SQ M
GLUCOSE P FAST SERPL-MCNC: 106 MG/DL (ref 65–99)
HBA1C MFR BLD: 5.3 %
HCT VFR BLD AUTO: 42.6 % (ref 34.8–46.1)
HDLC SERPL-MCNC: 55 MG/DL
HGB BLD-MCNC: 13.7 G/DL (ref 11.5–15.4)
LDLC SERPL CALC-MCNC: 147 MG/DL (ref 0–100)
LIPASE SERPL-CCNC: 47 U/L (ref 73–393)
MAGNESIUM SERPL-MCNC: 2.1 MG/DL (ref 1.6–2.6)
MCH RBC QN AUTO: 30.1 PG (ref 26.8–34.3)
MCHC RBC AUTO-ENTMCNC: 32.2 G/DL (ref 31.4–37.4)
MCV RBC AUTO: 94 FL (ref 82–98)
PLATELET # BLD AUTO: 379 THOUSANDS/UL (ref 149–390)
PMV BLD AUTO: 8.4 FL (ref 8.9–12.7)
POTASSIUM SERPL-SCNC: 3.8 MMOL/L (ref 3.5–5.3)
PROT SERPL-MCNC: 7.9 G/DL (ref 6.4–8.2)
RBC # BLD AUTO: 4.55 MILLION/UL (ref 3.81–5.12)
SODIUM SERPL-SCNC: 137 MMOL/L (ref 136–145)
TRIGL SERPL-MCNC: 92 MG/DL
TSH SERPL DL<=0.05 MIU/L-ACNC: 1.31 UIU/ML (ref 0.45–4.5)
WBC # BLD AUTO: 8.53 THOUSAND/UL (ref 4.31–10.16)

## 2022-06-13 PROCEDURE — 83690 ASSAY OF LIPASE: CPT

## 2022-06-13 PROCEDURE — 36415 COLL VENOUS BLD VENIPUNCTURE: CPT

## 2022-06-13 PROCEDURE — 80061 LIPID PANEL: CPT

## 2022-06-13 PROCEDURE — 76856 US EXAM PELVIC COMPLETE: CPT

## 2022-06-13 PROCEDURE — 85027 COMPLETE CBC AUTOMATED: CPT

## 2022-06-13 PROCEDURE — 84443 ASSAY THYROID STIM HORMONE: CPT

## 2022-06-13 PROCEDURE — 80053 COMPREHEN METABOLIC PANEL: CPT

## 2022-06-13 PROCEDURE — 83735 ASSAY OF MAGNESIUM: CPT

## 2022-06-13 PROCEDURE — 83036 HEMOGLOBIN GLYCOSYLATED A1C: CPT

## 2022-06-13 PROCEDURE — 76830 TRANSVAGINAL US NON-OB: CPT

## 2022-06-13 PROCEDURE — 82150 ASSAY OF AMYLASE: CPT

## 2022-06-13 NOTE — LETTER
[address] [place] [address]  [address] [facility] [address]      June 20, 2022    MRN: [id number]     Phone: [phone]     Dear Ms  Iris Long recently had a(n) Ultrasound performed on 6/13/2022 at  [address] [place] [address] that was requested by Enmanuel Hagan MD  The study was reviewed by a radiologist, which is a physician who specializes in medical imaging  The radiologist issued a report describing his or her findings  In that report there was a finding that the radiologist felt warranted further discussion with your health care provider and that discussion would be beneficial to you  The results were sent to Enmanuel Hagan MD on 06/17/2022  8:50 AM  We recommend that you contact Enmanuel Hagan MD at [phone] or set up an appointment to discuss the results of the imaging test  If you have already heard from Enmanuel Hagan MD regarding the results of your study, you can disregard this letter  This letter is not meant to alarm you, but intended to encourage you to follow-up on your results with the provider that sent you for the imaging study  In addition, we have enclosed answers to frequently asked questions by other patients who have also received a letter to review results with their health care provider (see page two)  Thank you for choosing [facility] [employer] for your medical imaging needs  FREQUENTLY ASKED QUESTIONS    1  Why am I receiving this letter? [facility] requires us to notify patients who have findings on imaging exams that may require more testing or follow-up with a health professional within the next 3 months          2  How serious is the finding on the imaging test?  This letter is sent to all patients who may need follow-up or more testing within the next 3 months  Receiving this letter does not necessarily mean you have a life-threatening imaging finding or disease  Recommendations in the radiologists imaging report are general in nature and it is up to your healthcare provider to say whether those recommendations make sense for your situation  You are strongly encouraged to talk to your health care provider about the results and ask whether additional steps need to be taken  3  Where can I get a copy of the final report for my recent radiology exam?  To get a full copy of the report you can access your records online at [email or web address] or please contact [address] [place] [address] Records Department at [phone] Monday through Friday between 8 am and 6 pm          4  What do I need to do now? Please contact your health care provider who requested the imaging study to discuss what further actions (if any) are needed  You may have already heard from (your ordering provider) in regard to this test in which case you can disregard this letter  NOTICE IN ACCORDANCE WITH THE [facility] PATIENT TEST RESULT INFORMATION ACT OF 2018    You are receiving this notice as a result of a determination by your diagnostic imaging service that further discussions of your test results are warranted and would be beneficial to you  The complete results of your test or tests have been or will be sent to the health care practitioner that ordered the test or tests  It is recommended that you contact your health care practitioner to discuss your results as soon as possible

## 2022-06-16 ENCOUNTER — TELEPHONE (OUTPATIENT)
Dept: FAMILY MEDICINE CLINIC | Facility: CLINIC | Age: 62
End: 2022-06-16

## 2022-06-16 NOTE — TELEPHONE ENCOUNTER
Dr Tank Vega:    Patient called asking for results from [address] [facility] she had done on 6/13? Please advise

## 2022-06-20 NOTE — TELEPHONE ENCOUNTER
Patient says she is returning Dr Caceres Begun phone call  Please call her after 3pm Monday- Friday or anytime on the weekend  Patient was advised that Dr Tank Vega is out of the office today

## 2022-06-22 NOTE — TELEPHONE ENCOUNTER
Patient returned your call  She is on lunch break for another 15 minutes  After that you can reach her after 3:30 pm today

## 2022-06-23 NOTE — TELEPHONE ENCOUNTER
[facility]- also if pt phones back and is agreeable please forward copy of pelvic u/s report to gyn in [facility]--thanks

## 2022-10-12 PROBLEM — Z12.4 CERVICAL CANCER SCREENING: Status: RESOLVED | Noted: 2020-12-08 | Resolved: 2022-10-12

## 2023-01-18 ENCOUNTER — OFFICE VISIT (OUTPATIENT)
Dept: OTOLARYNGOLOGY | Facility: CLINIC | Age: 63
End: 2023-01-18

## 2023-01-18 VITALS — HEIGHT: 65 IN | TEMPERATURE: 97.3 F | WEIGHT: 185 LBS | BODY MASS INDEX: 30.82 KG/M2

## 2023-01-18 DIAGNOSIS — H90.3 SENSORINEURAL HEARING LOSS (SNHL), BILATERAL: Primary | ICD-10-CM

## 2023-01-18 NOTE — PROGRESS NOTES
Assessment/Plan:    Sensorineural hearing loss (SNHL), bilateral  Reviewed recurrent otalgia and hearing loss and possible causes  Discussed ETD, dental changes, vs ear processes  No significant findings on exam today other than evidence of bruxism       Discussed TMJ syndrome - soft diet, jaw rest, NSAIDs, warm compresses, massage, physical therapy, oral appliance, or consultation with oral surgeon  For ear itching may use Hydrocortisone cream at bedtime to both ears for one month then as needed  Decrease use of q-tips     Audiogram 01/2022 indicating bilateral mild to moderate SNHL, noted left asymmetry at 250 K (conductive), also SNHL asymmetry on left at 8K  Type A tymps  100% word discrimination  Recommend repeat audiogram when able        Discussed options for hearing asymmetry including imaging Ct scan vs MRI brain with IAC to rule out otosclerosis vs acoustic neuroma  After discussion agree to repeat audiogram in one year, sooner if hearing changes  If hearing continue to to become more asymmetrical over time will proceed with imaging        Offered options for bilateral SNHL including acceptance, lifestyle changes such as moving closer to those who are speaking, or hearing amplification  She would qualify for hearing amplification based on audiogram   Discussed hearing aid options including facilities to obtain a hearing aid from as well as costs and benefits  Discussed that quality of life may improve with hearing amplification and she agreed to consider  May proceed with hearing aid evaluation        Pt will follow up with our office annually, sooner if symptoms worsen       Diagnoses and all orders for this visit:    Sensorineural hearing loss (SNHL), bilateral  -     Ambulatory referral to Audiology; Future          Subjective:      Patient ID: Caty Chaney is a 65 y o  female  Presents today as a follow up due to hearing loss  Gradually worsening    Asks people to repeat themselves often   History ear infections  Worse with mask use  Difficulty distinguishing words  Occasional pain in ears  Worse with being outside in cold  No otorrhea  Uses q-tips  No history ear surgery  Hearing feeling worse  Using hearing aids at least 4 days per week  More difficulty with voices vs sirens  No tinnitus  The following portions of the patient's history were reviewed and updated as appropriate: allergies, current medications, past family history, past medical history, past social history, past surgical history and problem list     Review of Systems   Constitutional: Negative  HENT: Positive for hearing loss  Negative for congestion, ear discharge, ear pain, nosebleeds, postnasal drip, rhinorrhea, sinus pressure, sinus pain, sore throat, tinnitus and voice change  Eyes: Negative  Respiratory: Negative for chest tightness and shortness of breath  Cardiovascular: Negative  Gastrointestinal: Negative  Endocrine: Negative  Musculoskeletal: Negative  Skin: Negative for color change  Neurological: Negative for dizziness, numbness and headaches  Psychiatric/Behavioral: Negative  Objective:      Temp (!) 97 3 °F (36 3 °C) (Temporal)   Ht 5' 5" (1 651 m)   Wt 83 9 kg (185 lb)   LMP 11/14/2017   BMI 30 79 kg/m²          Physical Exam  Constitutional:       Appearance: She is well-developed  HENT:      Head: Normocephalic  Right Ear: Hearing, tympanic membrane, ear canal and external ear normal  No decreased hearing noted  No drainage or tenderness  Tympanic membrane is not perforated or erythematous  Left Ear: Hearing, tympanic membrane, ear canal and external ear normal  No decreased hearing noted  No drainage or tenderness  Tympanic membrane is not perforated or erythematous  Nose: Nose normal  No nasal deformity or septal deviation  Mouth/Throat:      Mouth: Mucous membranes are not pale and not dry  No oral lesions        Dentition: Normal dentition  Pharynx: Uvula midline  No oropharyngeal exudate  Neck:      Trachea: No tracheal deviation  Cardiovascular:      Rate and Rhythm: Normal rate  Pulmonary:      Effort: Pulmonary effort is normal  No accessory muscle usage or respiratory distress  Musculoskeletal:      Right shoulder: Normal range of motion  Cervical back: Full passive range of motion without pain, normal range of motion and neck supple  Lymphadenopathy:      Cervical: No cervical adenopathy  Skin:     General: Skin is warm and dry  Neurological:      Mental Status: She is alert and oriented to person, place, and time  Cranial Nerves: No cranial nerve deficit  Sensory: No sensory deficit  Psychiatric:         Behavior: Behavior is cooperative

## 2023-01-19 NOTE — ASSESSMENT & PLAN NOTE
Reviewed recurrent otalgia and hearing loss and possible causes  Discussed ETD, dental changes, vs ear processes  No significant findings on exam today other than evidence of bruxism       Discussed TMJ syndrome - soft diet, jaw rest, NSAIDs, warm compresses, massage, physical therapy, oral appliance, or consultation with oral surgeon  For ear itching may use Hydrocortisone cream at bedtime to both ears for one month then as needed  Decrease use of q-tips     Audiogram 01/2022 indicating bilateral mild to moderate SNHL, noted left asymmetry at 250 K (conductive), also SNHL asymmetry on left at 8K  Type A tymps  100% word discrimination  Recommend repeat audiogram when able        Discussed options for hearing asymmetry including imaging Ct scan vs MRI brain with IAC to rule out otosclerosis vs acoustic neuroma  After discussion agree to repeat audiogram in one year, sooner if hearing changes  If hearing continue to to become more asymmetrical over time will proceed with imaging        Offered options for bilateral SNHL including acceptance, lifestyle changes such as moving closer to those who are speaking, or hearing amplification  She would qualify for hearing amplification based on audiogram   Discussed hearing aid options including facilities to obtain a hearing aid from as well as costs and benefits  Discussed that quality of life may improve with hearing amplification and she agreed to consider    May proceed with hearing aid evaluation        Pt will follow up with our office annually, sooner if symptoms worsen

## 2023-02-01 ENCOUNTER — OFFICE VISIT (OUTPATIENT)
Dept: AUDIOLOGY | Facility: CLINIC | Age: 63
End: 2023-02-01

## 2023-02-01 DIAGNOSIS — H90.3 SENSORY HEARING LOSS, BILATERAL: Primary | ICD-10-CM

## 2023-02-01 NOTE — PROGRESS NOTES
ADULT HEARING EVALUATION - [place] [address] AUDIOLOGY      Patient Name: Taco Irvin   MRN:  [id number]   :  1960   Age: 65 y o  Gender: female   DOS: 2023     HISTORY:     Taco Irvin, a 65 y o  female, was seen on 2023 at the referral of Dr Jeremy Lares for an audiometric evaluation  Ms Mitch Huerta was unaccompanied to today's visit  Ms Mitch Huerta was last seen in our clinic on 22 for a hearing aid check to discuss use and maintenance of hearing aids      Rajani's primary concern today was still having some trouble hearing in her work place which is a large [employer]  Onset of symptoms reportedly began a few years ago  Ms Mitch Huerta denied otalgia, otorrhea, aural fullness, tinnitus and dizziness  History of otitis was negative  Ms Mitch Huerta has no history of ear surgeries  Family history of hearing loss was negative  Communication difficulties include listening in noise and communicating at work    History of noise exposure is positive with the use of hearing protection    Other documented medical history states that Ms Mitch Huerta  has a past medical history of Anxiety, Borderline hyperlipidemia, Cancer ([address] ), Ovarian cyst, Uterine fibroid, and Vertigo         Otoscopic Evaluation:   Right Ear: Unremarkable, canal clear   Left Ear: Unremarkable, canal clear    Tympanometry:   Right Ear: Type A; normal middle ear pressure and static compliance    Left Ear: Type A; normal middle ear pressure and static compliance     Audiometry:  Conventional pure tone audiometry from 250 - 8000 Hz  obtained with good reliability and revealed the following:     Right Ear: mild to moderate sensorineural hearing loss (SNHL)   Left Ear: mild to severe sensorineural hearing loss (SNHL)     Distortion Product Otoacoustic Emissions (DPOAEs)   Right Ear: DNT   Left Ear: DNT    Speech Audiometry:    Speech Reception (SRT)   Right Ear: 25 dB HL   Left Ear: 35 dB HL    Word Recognition Scores (WRS):  Right Ear: excellent (100 % correct)     Left Ear: good (100 % correct)   Stimuli: [id number]    IMPRESSIONS:  Rosaura Aleman has a bilteral sensorineural hearing loss that seems essentially unchanged from the previous audiologic evaluation on 1/19/22 with the exception of a slight high frequency decrease in the left ear  The results of today's findings were reviewed with Ms Kathy Pagan and her hearing thresholds were explained at length  SEE AUDIOGRAM    [facility] HEARING AID CHECK    Amanda Guzman was seen today (2/1/2023) for a hearing aid check of her bilateral hearing aids  Today, Ms Kathy Pagan reported that she is still having some difficulty hearing in her workplace  She works in a [employer] type room with [employer]  She also reported that her britni on her iPhone is no longer working with the hearing aids       Otoscopy revealed Unremarkable, canal clear  Device Information    Hearing Aid Fitting Date: 2/23/22       Left Device Right Device   Hearing Aid Make: Coal Sheerer   Hearing Aid Model: MORE 3 MORE 3   Serial Number: [id number]   Repair Warranty Expiration Date: 3/11/25 3/11/25   Loss/Damage Warranty Expiration Date:  3/11/25 3/11/25    Length: 2 2    Output: 85 85   Wax System: Pro Wax Pro WAx   Dome Size/Style: 10 mm open 10 mm open   Battery: Lithium-ion Rechargeable Lithium-ion Rechargeable   Earmold Serial Number: N/A EBN/A   Earmold Warranty Date:  N/A N/A       Serial [id number]: [id number]  Accessories: N/A      Action:  Both hearing aids were cleaned and suctioned  Both hearing aids are working well and sound good via listening check  Both hearing aids were connected to [employer] and a firmware was successfully installed  Both hearing aids were turned up to adaptation level 3  The iPhone Oticon On britni was reinstalled and the hearing aids were able to be paired successfully with the iPhone  Rosaura Aleman was happy with the changes  Recommendations & Follow-up    Hearing aids(s) are in good working condition   Ms  Agata Paiz stated that she has no further questions with her hearing aid(s) and does not feed that any other adjustments are needed at this time  She will follow-up in 6 months, sooner if other problems/concerns arise  It was a pleasure working with Ms Agata Paiz today  She was encouraged to contact the clinic with any further questions in the meantime      Brooke Lim , [facility]  Clinical Audiologist    [address] [address] [id number]

## 2023-02-02 NOTE — PROGRESS NOTES
Progress Note    Name:  Anirudh Kelley  :  1960  Age:  65 y o  Date of Evaluation: 23     See full audiologic report and hearing aid check report       Brooke Kennedy , CCC-A  Clinical Audiologist

## 2023-03-11 ENCOUNTER — TELEPHONE (OUTPATIENT)
Dept: FAMILY MEDICINE CLINIC | Facility: CLINIC | Age: 63
End: 2023-03-11

## 2023-03-11 DIAGNOSIS — Z12.31 SCREENING MAMMOGRAM FOR BREAST CANCER: Primary | ICD-10-CM

## 2023-05-19 ENCOUNTER — TELEPHONE (OUTPATIENT)
Dept: FAMILY MEDICINE CLINIC | Facility: CLINIC | Age: 63
End: 2023-05-19

## 2023-05-19 ENCOUNTER — HOSPITAL ENCOUNTER (OUTPATIENT)
Dept: RADIOLOGY | Facility: HOSPITAL | Age: 63
Discharge: HOME | End: 2023-05-19
Attending: FAMILY MEDICINE

## 2023-05-19 VITALS — HEIGHT: 65 IN | BODY MASS INDEX: 30.82 KG/M2 | WEIGHT: 185 LBS

## 2023-05-19 DIAGNOSIS — Z12.31 ENCOUNTER FOR SCREENING MAMMOGRAM FOR MALIGNANT NEOPLASM OF BREAST: ICD-10-CM

## 2023-05-19 DIAGNOSIS — N63.21 MASS OF UPPER OUTER QUADRANT OF LEFT BREAST: Primary | ICD-10-CM

## 2023-05-20 ENCOUNTER — TELEPHONE (OUTPATIENT)
Dept: FAMILY MEDICINE CLINIC | Facility: CLINIC | Age: 63
End: 2023-05-20

## 2023-05-20 NOTE — TELEPHONE ENCOUNTER
I spoke with patient again this morning and told her she had to schedule her US , I gave her the information  Patient called to ask what her step is with her breast result?  She said she has to go in for a biopsy and wants to get it scheduled asap , please advise

## 2023-05-22 NOTE — TELEPHONE ENCOUNTER
Patient left voicemail requesting that Dr Mayo Bello call her back  I phoned Patient and asked if there was something I could help her with she stated when she tries to schedule her breast US they transfer her or she has to leave a vm  She wanted to see if we could pull any strings  I informed her that unfortunately we cannot  That she needs to just call back

## 2023-05-22 NOTE — TELEPHONE ENCOUNTER
Spoke w pt--order for diagnostic breast ultrasound in chart--advised pt to call me back if she does not hear from breast care health nurse in next couple days to schedule -

## 2023-05-24 ENCOUNTER — TELEPHONE (OUTPATIENT)
Dept: RADIOLOGY | Facility: HOSPITAL | Age: 63
End: 2023-05-24

## 2023-06-15 ENCOUNTER — HOSPITAL ENCOUNTER (OUTPATIENT)
Dept: RADIOLOGY | Facility: HOSPITAL | Age: 63
Discharge: HOME | End: 2023-06-15
Attending: FAMILY MEDICINE
Payer: COMMERCIAL

## 2023-06-15 DIAGNOSIS — N63.21 MASS OF UPPER OUTER QUADRANT OF LEFT BREAST: ICD-10-CM

## 2023-06-15 PROCEDURE — 76642 ULTRASOUND BREAST LIMITED: CPT

## 2023-09-26 ENCOUNTER — NURSE TRIAGE (OUTPATIENT)
Age: 63
End: 2023-09-26

## 2023-09-26 ENCOUNTER — TELEPHONE (OUTPATIENT)
Age: 63
End: 2023-09-26

## 2023-09-26 ENCOUNTER — OFFICE VISIT (OUTPATIENT)
Dept: FAMILY MEDICINE CLINIC | Facility: CLINIC | Age: 63
End: 2023-09-26
Payer: COMMERCIAL

## 2023-09-26 VITALS
TEMPERATURE: 97.9 F | WEIGHT: 190 LBS | SYSTOLIC BLOOD PRESSURE: 142 MMHG | RESPIRATION RATE: 14 BRPM | BODY MASS INDEX: 31.65 KG/M2 | HEIGHT: 65 IN | OXYGEN SATURATION: 98 % | HEART RATE: 88 BPM | DIASTOLIC BLOOD PRESSURE: 90 MMHG

## 2023-09-26 DIAGNOSIS — K04.7 DENTAL INFECTION: Primary | ICD-10-CM
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
## 2023-09-26 DIAGNOSIS — R59.0 CERVICAL LYMPHADENOPATHY: ICD-10-CM

## 2023-09-26 PROCEDURE — 99214 OFFICE O/P EST MOD 30 MIN: CPT | Performed by: STUDENT IN AN ORGANIZED HEALTH CARE EDUCATION/TRAINING PROGRAM

## 2023-09-26 RX ORDER — AMOXICILLIN 500 MG/1
CAPSULE ORAL
COMMUNITY
Start: 2023-08-16 | End: 2023-09-26

## 2023-09-26 RX ORDER — AMOXICILLIN AND CLAVULANATE POTASSIUM 875; 125 MG/1; MG/1
1 TABLET, FILM COATED ORAL EVERY 12 HOURS SCHEDULED
Qty: 14 TABLET | Refills: 0 | Status: SHIPPED | OUTPATIENT
Start: 2023-09-26 | End: 2023-10-03

## 2023-09-26 NOTE — PROGRESS NOTES
Clinic Visit Note  Martha Grayson 59 y.o. female   MRN: [id number]    Assessment and Plan      Diagnoses and all orders for this visit:    Dental infection  Cervical lymphadenopathy  Insect bite, unspecified site, initial encounter  -     amoxicillin-clavulanate (AUGMENTIN) 875-125 mg per tablet; Take 1 tablet by mouth every 12 (twelve) hours for 7 days    Etiology appears to be multifactorial in the setting of presumed dental infection that is requiring root canal tomorrow, cervical lymphadenopathy and insect bite, recommend Augmentin x7 days for complete coverage. If symptoms worsen or not responding reevaluation recommended. We discussed I recommend continuing with root canal tomorrow given limited symptoms, follow closely. Other orders  -     Discontinue: amoxicillin (AMOXIL) 500 mg capsule      My impressions and treatment recommendations were discussed in detail with the patient who verbalized understanding and had no further questions. Discharge instructions were provided. Subjective     Chief Complaint: Acute care visit    History of Present Illness:    Patient is a pleasant 52-year-old female coming in for acute care visit secondary to left-sided neck swelling, dental infection, concern for insect bite. Patient denies fever/chills, will also be going for root canal tomorrow after previously seeing dentistry earlier this week. The following portions of the patient's history were reviewed and updated as appropriate: allergies, current medications, past family history, past medical history, past social history, past surgical history and problem list.    REVIEW OF SYSTEMS:  A complete 12-point review of systems is negative other than that noted in the HPI. Review of Systems   Constitutional: Negative for chills, fatigue and fever. HENT: Positive for ear pain, postnasal drip and sore throat. Negative for congestion. Respiratory: Negative for cough, shortness of breath and wheezing. Cardiovascular: Negative for chest pain, palpitations and leg swelling. Neurological: Negative for dizziness and headaches. Current Outpatient Medications:   •  amoxicillin-clavulanate (AUGMENTIN) 875-125 mg per tablet, Take 1 tablet by mouth every 12 (twelve) hours for 7 days, Disp: 14 tablet, Rfl: 0  •  cholecalciferol (VITAMIN D3) 1,000 units tablet, Take 1,000 Units by mouth daily, Disp: , Rfl:   •  Multiple Vitamin (DAILY VITAMINS PO), Take by mouth daily, Disp: , Rfl:   Past Medical History:   Diagnosis Date   • Anxiety    • Borderline hyperlipidemia    • Cancer ([address])     Basal Cell skin ca   • Ovarian cyst    • Uterine fibroid    • Vertigo      Past Surgical History:   Procedure Laterality Date   • BREAST CYST ASPIRATION  1990    patient does not know   • HERNIA REPAIR  2011   • ORIF WRIST FRACTURE Right 11/14/2018    Procedure: OPEN REDUCTION W/ INTERNAL FIXATION (ORIF) RADIUS / ULNA (WRIST);   Surgeon: Noe Vivas DO;  Location: [facility];  Service: Orthopedics     Social History     Socioeconomic History   • Marital status: /Civil Union     Spouse name: Not on file   • Number of children: Not on file   • Years of education: Not on file   • Highest education level: Not on file   Occupational History   • Not on file   Tobacco Use   • Smoking status: Never   • Smokeless tobacco: Never   Vaping Use   • Vaping Use: Never used   Substance and Sexual Activity   • Alcohol use: Yes     Comment: social   • Drug use: No   • Sexual activity: Not on file   Other Topics Concern   • Not on file   Social History Narrative    ** Merged History Encounter **          Social Determinants of Health     Financial Resource Strain: Not on file   Food Insecurity: Not on file   Transportation Needs: Not on file   Physical Activity: Not on file   Stress: Not on file   Social Connections: Not on file   Intimate Partner Violence: Not on file   Housing Stability: Not on file     Family History   Problem Relation Age of Onset   • Cancer Mother         does not know what  type   • Alzheimer's disease Father    • No Known Problems Sister    • No Known Problems Brother    • No Known Problems Paternal Aunt    • No Known Problems Paternal Uncle    • No Known Problems Maternal Grandmother    • No Known Problems Maternal Grandfather    • No Known Problems Paternal Grandmother    • No Known Problems Paternal Grandfather    • No Known Problems Daughter    • No Known Problems Sister    • No Known Problems Paternal Aunt    • No Known Problems Paternal Aunt    • No Known Problems Paternal Aunt    • ADD / ADHD Neg Hx    • Anesthesia problems Neg Hx    • Clotting disorder Neg Hx    • Collagen disease Neg Hx    • Diabetes Neg Hx    • Dislocations Neg Hx    • Learning disabilities Neg Hx    • Neurological problems Neg Hx    • Osteoporosis Neg Hx    • Rheumatologic disease Neg Hx    • Scoliosis Neg Hx    • Vascular Disease Neg Hx      No Known Allergies    Objective     Vitals:    09/26/23 1353   BP: 142/90   BP Location: Left arm   Patient Position: Sitting   Cuff Size: Adult   Pulse: 88   Resp: 14   Temp: 97.9 °F (36.6 °C)   TempSrc: Temporal   SpO2: 98%   Weight: 86.2 kg (190 lb)   Height: 5' 5" (1.651 m)       Physical Exam:     GENERAL: NAD, pleasant   HEENT:  NC/AT, PERRL, EOMI, no scleral icterus, left-sided cervical lymphadenopathy  CARDIAC:  RRR, +S1/S2, no S3/S4 appreciated, no m/g/r  PULMONARY:  CTA B/L, no wheezing/rales/rhonci, non-labored breathing  ABDOMEN:  Soft, NT/ND, no rebound/guarding/rigidity  Extremities:. No edema, cyanosis, or clubbing  Musculoskeletal:  Full range of motion intact in all extremities   NEUROLOGIC: Grossly intact, no focal deficits  SKIN:  No rashes or erythema noted on exposed skin  Psych: Normal affect, mood stable    ==  PLEASE NOTE:  This encounter was completed utilizing the [employer] Direct Speech Voice Recognition Software.  Grammatical errors, random word insertions, pronoun errors and incomplete sentences are occasional consequences of the system due to software limitations, ambient noise and hardware issues. These may be missed by proof reading prior to affixing electronic signature. Any questions or concerns about the content, text or information contained within the body of this dictation should be directly addressed to the physician for clarification. Please do not hesitate to call me directly if you have any any questions or concerns.     Keith Hernandez, DO [facility]   [facility]

## 2023-09-26 NOTE — TELEPHONE ENCOUNTER
appt set with other PCP in the office . Patient aware the office may call and change if appt time is not appropriate . She declined Urgent Care     C/o throat pain on the side of the bite, which is behind her ear  . Took Benadryl . To have root canal done tomorrow and wants this looked at prior to the procedure . Is currently on Amox from the dentist .  Wishes appt today . Reason for Disposition  • Red or very tender (to touch) area, and started over 24 hours after the bite    Answer Assessment - Initial Assessment Questions  1. TYPE of INSECT: "What type of insect was it?"       unknown  2. ONSET: "When did you get bitten?"       yesterday  3. LOCATION: "Where is the insect bite located?"       Behind left ear  4. REDNESS: "Is the area red or pink?" If Yes, ask: "What size is area of redness?" (inches or cm). "When did the redness start?"      swollen neck , warm to touch   5. PAIN: "Is there any pain?" If Yes, ask: "How bad is it?"  (Scale 1-10; or mild, moderate, severe)      severe  6. ITCHING: "Does it itch?" If Yes, ask: "How bad is the itch?"     - MILD: doesn't interfere with normal activities    - MODERATE-SEVERE: interferes with work, school, sleep, or other activities       mild  7. SWELLING: "How big is the swelling?" (inches, cm, or compare to coins)      Swollen gland  8. OTHER SYMPTOMS: "Do you have any other symptoms?"  (e.g., difficulty breathing, hives)      Painful swallowing   9.  PREGNANCY: "Is there any chance you are pregnant?" "When was your last menstrual period?"    Protocols used: INSECT BITE-ADULT-OH

## 2023-09-26 NOTE — TELEPHONE ENCOUNTER
Please see Triage call- booked an appt with other PCP in the office. She is aware if the appt is not appropriate she will hear back from the office.

## 2023-11-28 ENCOUNTER — TELEPHONE (OUTPATIENT)
Age: 63
End: 2023-11-28

## 2023-11-28 NOTE — TELEPHONE ENCOUNTER
Pt needs to have a diagnostic mammogram to track the size of a cyst. Pt did not know which breast. Pt will need an order to schedule. She thinks it's the left side. She needs to have this every 6 months. Please, let pt know when entered. Pt also wants a flu shot and pneumo shot. She was not sure that they could be scheduled together. Pt also thought she had the pneumo shot last year and I told her she wouldn't need one this year. Can you please, see if pt needs pneumo shot.  I did schedule her for a flu shot per her request.

## 2023-11-30 DIAGNOSIS — N63.21 MASS OF UPPER OUTER QUADRANT OF LEFT BREAST: Primary | ICD-10-CM

## 2023-12-01 ENCOUNTER — IMMUNIZATIONS (OUTPATIENT)
Dept: FAMILY MEDICINE CLINIC | Facility: CLINIC | Age: 63
End: 2023-12-01
Payer: COMMERCIAL

## 2023-12-01 DIAGNOSIS — Z23 ENCOUNTER FOR IMMUNIZATION: Primary | ICD-10-CM

## 2023-12-01 PROCEDURE — 90686 IIV4 VACC NO PRSV 0.5 ML IM: CPT

## 2023-12-01 PROCEDURE — 90471 IMMUNIZATION ADMIN: CPT

## 2024-02-22 ENCOUNTER — OFFICE VISIT (OUTPATIENT)
Dept: AUDIOLOGY | Facility: CLINIC | Age: 64
End: 2024-02-22

## 2024-02-22 DIAGNOSIS — H90.3 SENSORY HEARING LOSS, BILATERAL: Primary | ICD-10-CM

## 2024-02-25 NOTE — PROGRESS NOTES
Hearing Aid Visit:    Name:  Rajani Piper  :  1960  Age:  63 y.o.  Date of Evaluation: 24    Rajani Piper is being seen for a hearing aid visit.  It has been a year since her last HA service.       Device Information     Hearing Aid Fitting Date: 22       Left Device Right Device   Hearing Aid Make: Oticon Oticon   Hearing Aid Model: MORE 3 MORE 3   Serial Number: [id number]   Repair Warranty Expiration Date: 3/11/25 3/11/25   Loss/Damage Warranty Expiration Date:  3/11/25 3/11/25    Length: 2 2    Output: 85 85   Wax System: Mini Pro Wax Mini pro Wax   Dome Size/Style: 10 mm open 10 mm open   Battery: Lithium-ion Rechargeable Lithium-ion Rechargeable   Earmold Serial Number: N/A EBN/A   Earmold Warranty Date:  N/A N/A       Serial Number: [id number]  Accessories: N/A     Actions:  Canals are clear   Firmware updated to 1.4.4   Both hearing aids were cleaned and suctioned  Both hearing aids are working well via listening check.    Recommendations:   RTO 24 for 2 year audio and 6 month HA visit     Lynn Jensen CCC-A  Clinical Audiologist  [phone]

## 2024-03-18 ENCOUNTER — TELEPHONE (OUTPATIENT)
Dept: FAMILY MEDICINE CLINIC | Facility: CLINIC | Age: 64
End: 2024-03-18

## 2024-03-18 NOTE — TELEPHONE ENCOUNTER
Dr. Marino:    Patient needs a diagnostic mammogram/US placed in her chart. Please advise when order is in chart.

## 2024-03-19 DIAGNOSIS — N63.21 MASS OF UPPER OUTER QUADRANT OF LEFT BREAST: Primary | ICD-10-CM

## 2024-03-19 NOTE — TELEPHONE ENCOUNTER
Dr. Marino:    Spoke w/patient she stated that she needs diagnostic mammogram and left diagnostic US.

## 2024-03-19 NOTE — TELEPHONE ENCOUNTER
Please confirm w pt and /or radiology--pt needs bilateral diagnostic mammo and diagnostic left breast u/s?

## 2024-06-19 ENCOUNTER — HOSPITAL ENCOUNTER (OUTPATIENT)
Dept: RADIOLOGY | Facility: HOSPITAL | Age: 64
Discharge: HOME | End: 2024-06-19
Attending: FAMILY MEDICINE
Payer: COMMERCIAL

## 2024-06-19 VITALS — WEIGHT: 175 LBS | HEIGHT: 65 IN | BODY MASS INDEX: 29.16 KG/M2

## 2024-06-19 DIAGNOSIS — N63.21 MASS OF UPPER OUTER QUADRANT OF LEFT BREAST: ICD-10-CM

## 2024-06-19 PROCEDURE — 76642 ULTRASOUND BREAST LIMITED: CPT

## 2024-06-19 PROCEDURE — G0279 TOMOSYNTHESIS, MAMMO: HCPCS

## 2024-06-19 PROCEDURE — 77066 DX MAMMO INCL CAD BI: CPT

## 2024-08-02 ENCOUNTER — CONSULT (OUTPATIENT)
Dept: FAMILY MEDICINE CLINIC | Facility: CLINIC | Age: 64
End: 2024-08-02
Payer: COMMERCIAL

## 2024-08-02 VITALS
WEIGHT: 186 LBS | OXYGEN SATURATION: 97 % | BODY MASS INDEX: 30.99 KG/M2 | SYSTOLIC BLOOD PRESSURE: 130 MMHG | RESPIRATION RATE: 14 BRPM | HEIGHT: 65 IN | DIASTOLIC BLOOD PRESSURE: 82 MMHG | HEART RATE: 84 BPM | TEMPERATURE: 98 F

## 2024-08-02 DIAGNOSIS — Z01.818 PRE-OP EXAMINATION: Primary | ICD-10-CM

## 2024-08-02 DIAGNOSIS — H26.9 CATARACT OF RIGHT EYE, UNSPECIFIED CATARACT TYPE: ICD-10-CM

## 2024-08-02 PROCEDURE — 99215 OFFICE O/P EST HI 40 MIN: CPT | Performed by: FAMILY MEDICINE

## 2024-08-02 PROCEDURE — 93000 ELECTROCARDIOGRAM COMPLETE: CPT | Performed by: FAMILY MEDICINE

## 2024-08-02 NOTE — PROGRESS NOTES
Assessment/Plan:    1. Pre-op examination  Comments:  ekg: nsr hr 80    PT MEDICALLY CLEARED FOR CATARACT SURGERY.  Orders:  -     POCT ECG  2. Cataract of right eye, unspecified cataract type  3. Adult BMI 30.0-30.9 kg/sq m       Subjective:      Patient ID: Rajani Piper is a 63 y.o. female.    Chief Complaint   Patient presents with   • Pre-op Exam     Cataract surgery rt eye 8-8-24 Dr Sruthi Patton [facility] phone [phone] please fax O.V and EKG to [phone]       HPI  62yo pt in for pre-op exam for right cataract, Date of surgery: 8/8/24. Surgeon: Dr. Sruthi Patton. Indication: [facility].  No acute c/o at time of visit and no known recent illness exposures.    Pt reports no adverse reaction to any prior anesthesia received including one or more of the following-general, epidural and spinal.    Pertinent medical and surgical history reviewed in problem lists.  Pt able to walk 4 blocks or 2 flights of stairs without any concerning cardiovascular symptoms.   Pt denies symptoms of easy bruising/bleeding/chest pain/palitations/new or changing edema/cough/wheezing/dyspnea.    Pt denies excessive alcohol intake, tobacco or illicit drug use.    Family hx is negative for adverse rxn to anesthesia, clotting or bleeding disorder. Ischemic heart disease, stroke, aneurysm or early sudden death.    The patient's home  Living situation is secure and supportive and there are no post-op concerns in this regard.    The following portions of the patient's history were reviewed and updated as appropriate: allergies, current medications, past family history, past medical history, past social history, past surgical history and problem list.    Past Medical History:   Diagnosis Date   • Anxiety    • Borderline hyperlipidemia    • Cancer (HCC)     Basal Cell skin ca   • Ovarian cyst    • Uterine fibroid    • Vertigo      Past Surgical History:   Procedure Laterality Date   • BREAST CYST ASPIRATION  1990    patient does not know   •  "HERNIA REPAIR  2011   • ORIF WRIST FRACTURE Right 11/14/2018    Procedure: OPEN REDUCTION W/ INTERNAL FIXATION (ORIF) RADIUS / ULNA (WRIST);  Surgeon: Marv Townsend DO;  Location: [facility];  Service: Orthopedics       Review of Systems   Constitutional: Negative.    Eyes:  Positive for visual disturbance.   Respiratory: Negative.     Cardiovascular: Negative.    Gastrointestinal: Negative.    Musculoskeletal: Negative.    Neurological: Negative.    Psychiatric/Behavioral: Negative.           No current outpatient medications on file.     No current facility-administered medications for this visit.   No Known Allergies    Immunization History   Administered Date(s) Administered   • COVID-19 MODERNA VACC 0.5 ML IM 01/20/2021, 02/17/2021, 11/27/2021   • Hep B, adult 07/13/2016, 08/10/2016, 12/09/2016   • INFLUENZA 10/04/2011   • Influenza Quadrivalent Preservative Free 3 years and older IM 10/07/2014, 10/10/2016   • Influenza, injectable, quadrivalent, preservative free 0.5 mL 10/25/2018, 12/01/2023   • Influenza, recombinant, quadrivalent,injectable, preservative free 10/14/2019, 10/20/2020, 01/21/2022   • Influenza, seasonal, injectable 12/30/2013, 10/20/2015, 10/30/2017   • Tdap 07/28/2011   • Tuberculin Skin Test-PPD Intradermal 06/28/2016         Objective:    /82   Pulse 84   Temp 98 °F (36.7 °C) (Temporal)   Resp 14   Ht 5' 5\" (1.651 m)   Wt 84.4 kg (186 lb)   LMP 11/14/2017   SpO2 97%   BMI 30.95 kg/m²        Physical Exam  Vitals and nursing note reviewed.   Constitutional:       General: She is not in acute distress.     Comments: OW   HENT:      Head: Normocephalic and atraumatic.   Eyes:      General: No scleral icterus.     Extraocular Movements: Extraocular movements intact.      Conjunctiva/sclera: Conjunctivae normal.      Pupils: Pupils are equal, round, and reactive to light.      Comments: +Right cataract   Cardiovascular:      Rate and Rhythm: Normal rate and regular rhythm. "   Pulmonary:      Effort: Pulmonary effort is normal. No respiratory distress.      Breath sounds: Normal breath sounds.   Abdominal:      General: Bowel sounds are normal.      Palpations: Abdomen is soft.      Tenderness: There is no abdominal tenderness. There is no right CVA tenderness or left CVA tenderness.   Genitourinary:     Comments:     Musculoskeletal:         General: No tenderness. Normal range of motion.      Cervical back: Neck supple. No tenderness.      Right lower leg: No edema.      Left lower leg: No edema.   Skin:     General: Skin is warm and dry.      Capillary Refill: Capillary refill takes less than 2 seconds.      Coloration: Skin is not jaundiced.   Neurological:      General: No focal deficit present.      Mental Status: She is alert and oriented to person, place, and time.      Cranial Nerves: No cranial nerve deficit.   Psychiatric:         Mood and Affect: Mood normal.             Lexis Marino MD

## 2024-08-12 ENCOUNTER — OFFICE VISIT (OUTPATIENT)
Dept: AUDIOLOGY | Facility: CLINIC | Age: 64
End: 2024-08-12
Payer: COMMERCIAL

## 2024-08-12 DIAGNOSIS — H90.3 SENSORY HEARING LOSS, BILATERAL: Primary | ICD-10-CM

## 2024-08-12 PROCEDURE — 92557 COMPREHENSIVE HEARING TEST: CPT | Performed by: AUDIOLOGIST

## 2024-08-16 NOTE — PROGRESS NOTES
Diagnostic Hearing Evaluation    Name:  Rajani Piper  :  1960  Age:  63 y.o.   MRN:  [id number]  Date of Evaluation: 24    HISTORY:     Reason for visit:  Annual hearing evaluation     Rajani Piper is being seen today at the request of Ms. Regina SHEEHAN for an annual evaluation of hearing. The patient reports recent cataract surgery, right eye. The patient  denies dizziness and tinnitus.     EVALUATION:    Otoscopic Evaluation:   Right Ear: Unremarkable, canal clear   Left Ear: Unremarkable, canal clear    Tympanometry:   Right Ear: Type A; normal middle ear pressure and static compliance    Left Ear: Type A; normal middle ear pressure and static compliance     Speech Audiometry:  Speech Reception (SRT)    Right Ear: 15 dB HL    Left Ear: 30 dB HL    Word Recognition Scores (WRS):  Right Ear: excellent (100 % correct)     Left Ear: fair (76 % correct)  92% 2023 [facility]22    Stimuli: [id number]-6    Pure Tone Audiometry:  Conventional pure tone audiometry from 250 - 8000 Hz  was obtained with good reliability and revealed the following:     Right Ear: Normal sloping to moderate sensorineural hearing loss (SNHL)    Left Ear: Normal sloping to severe sensorineural hearing loss (SNHL)     *see attached audiogram    IMPRESSIONS:   Slight asymmetry and decreased thresholds, left ear, compared to right.   Word recognition score, left, may be decreased due to change of word list, however, patient notes changes in left ear.  Middle ear functioning is normal.      RECOMMENDATIONS:  Consider reassessment with ENT / she did not attend her appointment in January   Annual hearing evaluations for monitoring purposes     PATIENT EDUCATION:   The results of today's results and recommendations were reviewed with the patient and her hearing thresholds were explained at length. Treatment options, including amplification and communication strategies, were discussed as appropriate. The patient voiced understanding of her test results.  Questions were addressed and the patient was encouraged to contact our department should concerns arise.      Hermilo Glaser, CCC-A  Clinical Audiologist  [id number]  [facility] AUDIOLOGY  [address] [facility] [address]

## 2024-08-16 NOTE — PROGRESS NOTES
Hearing Aid Visit:    Name:  Rajani Piper  :  1960  Age:  63 y.o.  Date of Evaluation: 24    Rajani Piper is being seen for a hearing aid visit in conjunction with an annual hearing evaluation.      Device Information     Hearing Aid Fitting Date: 22       Left Device Right Device   Hearing Aid Make: Oticon Oticon   Hearing Aid Model: MORE 3 MORE 3   Serial Number: [id number]   Repair Warranty Expiration Date: 3/11/25 3/11/25   Loss/Damage Warranty Expiration Date:  3/11/25 3/11/25    Length: 2 2    Output: 85 85   Wax System: Mini Pro Wax Mini pro Wax   Dome Size/Style: 10 mm open bass  10 mm open bass    Battery: Lithium-ion Rechargeable Lithium-ion Rechargeable       Serial Number: [id number]  Accessories: N/A     Actions:  Canals are clear   Changed both batteries (under warranty)  Checked settings against audiogram   Cleaned and checked both     Recommendations:   RTO 2/3/25 for 6 month check / discuss warranty information     Hermilo Glaser, CCC-A  Clinical Audiologist  [id number]  [phone]

## 2024-08-19 NOTE — PROGRESS NOTES
"Tried to reach patient to schedule an appt for January, 2025.  VM would not accept messages and said to \"call back later\"  "

## 2024-08-21 ENCOUNTER — TELEPHONE (OUTPATIENT)
Dept: OTOLARYNGOLOGY | Facility: CLINIC | Age: 64
End: 2024-08-21

## 2024-08-21 NOTE — TELEPHONE ENCOUNTER
LM to offer appt for annual ear checkup in January, 2025.  Recommendation was made by audiologist, Dana Jensen, at the patient's recent appt in Audiology.  Please schedule follow-up appt in Jan. 2025 if patient calls back.  Thank you

## 2024-09-16 NOTE — PROGRESS NOTES
COVID-19 Virtual Visit     Assessment/Plan:    Problem List Items Addressed This Visit     Close exposure to COVID-19 virus - Primary     Exposure to son, Victorino Strickland (), on 4/9/4013  Son developed sxs next day- tested 1/7/2021--came back with +result 1/9/2021  Pt currently asymptomatic  covid test ordered-to be done today at [facility]  Follow-up in 2 days--sooner prn         Relevant Orders    Novel Coronavirus (COVID-19), PCR [facility] - Collected at [facility] or [facility]         Disposition:     I referred patient to one of our centralized sites for a COVID-19 swab  I have spent 10 minutes directly with the patient  Encounter provider Jaxon Estevez MD    Provider located at [address] [place] [id number]    Recent Visits  No visits were found meeting these conditions  Showing recent visits within past 7 days and meeting all other requirements     Today's Visits  Date Type Provider Dept   01/11/21 Telemedicine Jaxon Estevez MD [facility] today's visits and meeting all other requirements     Future Appointments  No visits were found meeting these conditions  Showing future appointments within next 150 days and meeting all other requirements        Patient agrees to participate in a virtual check in via telephone or video visit instead of presenting to the office to address urgent/immediate medical needs  Patient is aware this is a billable service  After connecting through Telephone, the patient was identified by name and date of birth  Sam Glasgow was informed that this was a telemedicine visit and that the exam was being conducted confidentially over secure lines  My office door was closed  No one else was in the room  Sam Glasgow acknowledged consent and understanding of privacy and security of the telemedicine visit   I informed the patient that I have reviewed her record in Epic and presented the [facility] Prior Authorization Team   Phone: [phone]      EPA APPROVAL:      opportunity for her to ask any questions regarding the visit today  The patient agreed to participate  It was my intent to perform this visit via video technology but the patient was not able to do a video connection so the visit was completed via audio telephone only  Subjective: Liz Lazo is a 62 y o  female who is concerned about COVID-19  Patient is currently asymptomatic  Patient denies fever, chills, fatigue, malaise, congestion, rhinorrhea, sore throat, anosmia, loss of taste, cough, shortness of breath, chest tightness, abdominal pain, nausea, vomiting, diarrhea, myalgias and headaches  Date of exposure: 1/6/2021    Exposure:   Contact with a person who is under investigation (PUI) for or who is positive for COVID-19 within the last 14 days?: Yes    Hospitalized recently for fever and/or lower respiratory symptoms?: No      Currently a healthcare worker that is involved in direct patient care?: No      Works in a special setting where the risk of COVID-19 transmission may be high? (this may include long-term care, correctional and senior living facilities; homeless shelters; assisted-living facilities and group homes ): No      Resident in a special setting where the risk of COVID-19 transmission may be high? (this may include long-term care, correctional and senior living facilities; homeless shelters; assisted-living facilities and group homes ): No      No results found for: Riki Garrisonbao, [address] [facility], [address] [place] [address],[address], [place] [address]  Past Medical History:   Diagnosis Date    Anxiety     Borderline hyperlipidemia     Cancer ([facility] [address] )     Basal Cell skin ca    Ovarian cyst     Uterine fibroid     Vertigo      Past Surgical History:   Procedure Laterality Date    HERNIA REPAIR  2011    ORIF WRIST FRACTURE Right 11/14/2018    Procedure: OPEN REDUCTION W/ INTERNAL FIXATION (ORIF) RADIUS / ULNA (WRIST);   Surgeon: Maya Alaniz DO;  Location: [address] [place] [address];  Service: Orthopedics     Current Outpatient Medications   Medication Sig Dispense Refill    Ascorbic Acid ([facility] VITAMIN C) 500 MG CHEW Chew 500 mg daily        calcium acetate (PHOSLO) 667 mg capsule Take 1,334 mg by mouth 3 (three) times a day with meals      cholecalciferol (VITAMIN D3) 1,000 units tablet Take 1,000 Units by mouth daily      Multiple Vitamin (DAILY VITAMINS PO) Take by mouth daily        nystatin (MYCOSTATIN) cream Apply topically 2 (two) times a day 30 g 0    predniSONE 20 mg tablet 2 tabs po x2d, 1 tab po x2d, 1/2 tab po x2d-take w food 7 tablet 0    zinc oxide (Desitin) 13 % cream Apply topically 2 (two) times a day       No current facility-administered medications for this visit  No Known Allergies    Review of Systems   Constitutional: Negative for chills, fatigue and fever  HENT: Negative for congestion, rhinorrhea and sore throat  Respiratory: Negative for cough, chest tightness and shortness of breath  Gastrointestinal: Negative for abdominal pain, diarrhea, nausea and vomiting  Musculoskeletal: Negative for myalgias  Neurological: Negative for headaches  Objective:    Vitals:    01/11/21 0959   BP: 133/87   BP Location: Right arm   Patient Position: Sitting   Cuff Size: Standard   Pulse: 105       Physical Exam  Vitals signs and nursing note reviewed  Constitutional:       General: She is not in acute distress  Pulmonary:      Effort: No respiratory distress  Neurological:      Mental Status: She is alert and oriented to person, place, and time  Psychiatric:         Mood and Affect: Mood normal        VIRTUAL VISIT DISCLAIMER    Rosibel Whitlock acknowledges that she has consented to an online visit or consultation  She understands that the online visit is based solely on information provided by her, and that, in the absence of a face-to-face physical evaluation by the physician, the diagnosis she receives is both limited and provisional in terms of accuracy and completeness   This is not intended to replace a full medical face-to-face evaluation by the physician  Koffi Cheema understands and accepts these terms

## 2025-01-31 LAB — HBA1C MFR BLD HPLC: 5.4 %

## 2025-02-03 ENCOUNTER — OFFICE VISIT (OUTPATIENT)
Dept: AUDIOLOGY | Facility: CLINIC | Age: 65
End: 2025-02-03

## 2025-02-03 DIAGNOSIS — H90.3 SENSORY HEARING LOSS, BILATERAL: Primary | ICD-10-CM

## 2025-02-03 NOTE — PROGRESS NOTES
Hearing Aid Visit:    Name:  Rajani Piper  :  1960  Age:  64 y.o.  MRN:  [id number]  Date of Evaluation: 25     HISTORY:    Rajani Piper was seen today (2/3/2025) for a(n) in-warranty hearing aid check of her bilateral hearing aids. Today, Rajani reports no issues with the instruments.    Most recent Audiogram: 24    DEVICE INFORMATION:        Left Device Right Device   Hearing Aid Make: OtWearable Intelligence OtWearable Intelligence   Hearing Aid Model: MORE 3 MORE 3   Serial Number: [id number]   Repair Warranty Expiration Date: 3/11/25 3/11/25   Loss/Damage Warranty Expiration Date:  3/11/25 3/11/25    Length: 2 2    Output: 85 85   Wax System: Mini Pro Wax Mini pro Wax   Dome Size/Style: 10 mm open bass  10 mm open bass    Battery: Lithium-ion Rechargeable Lithium-ion Rechargeable     ACTION/ADJUSTMENTS:    Cleaned and checked instruments. Redux drying treatment was completed and removed .5 uL of moisture from the hearing aid(s).  Good sound quality. Discussed warranty extension and future costs if aids are no longer under warranty. Patient will contact center should she wish to purchase extended warranty.    RECOMMENDATIONS:     Patient will contact center with any concerns.      Hermilo [place]  Clinical Audiologist  [facility] PROFESSIONAL [facility] AUDIOLOGY  [address] [facility] [id number]

## 2025-02-07 ENCOUNTER — OFFICE VISIT (OUTPATIENT)
Dept: FAMILY MEDICINE CLINIC | Facility: CLINIC | Age: 65
End: 2025-02-07
Payer: COMMERCIAL

## 2025-02-07 ENCOUNTER — TELEPHONE (OUTPATIENT)
Dept: ADMINISTRATIVE | Facility: OTHER | Age: 65
End: 2025-02-07

## 2025-02-07 VITALS
TEMPERATURE: 98.3 F | BODY MASS INDEX: 28.99 KG/M2 | OXYGEN SATURATION: 97 % | RESPIRATION RATE: 18 BRPM | SYSTOLIC BLOOD PRESSURE: 150 MMHG | HEIGHT: 65 IN | DIASTOLIC BLOOD PRESSURE: 90 MMHG | HEART RATE: 84 BPM | WEIGHT: 174 LBS

## 2025-02-07 DIAGNOSIS — Z23 ENCOUNTER FOR IMMUNIZATION: ICD-10-CM

## 2025-02-07 DIAGNOSIS — R39.89 URINARY PROBLEM: Primary | ICD-10-CM

## 2025-02-07 LAB
SL AMB  POCT GLUCOSE, UA: NORMAL
SL AMB LEUKOCYTE ESTERASE,UA: NORMAL
SL AMB POCT BILIRUBIN,UA: NORMAL
SL AMB POCT BLOOD,UA: NORMAL
SL AMB POCT CLARITY,UA: CLEAR
SL AMB POCT COLOR,UA: YELLOW
SL AMB POCT KETONES,UA: NORMAL
SL AMB POCT NITRITE,UA: NORMAL
SL AMB POCT PH,UA: 6
SL AMB POCT SPECIFIC GRAVITY,UA: 1
SL AMB POCT URINE PROTEIN: NORMAL
SL AMB POCT UROBILINOGEN: NORMAL

## 2025-02-07 PROCEDURE — 87086 URINE CULTURE/COLONY COUNT: CPT | Performed by: FAMILY MEDICINE

## 2025-02-07 PROCEDURE — 90673 RIV3 VACCINE NO PRESERV IM: CPT | Performed by: FAMILY MEDICINE

## 2025-02-07 PROCEDURE — 87077 CULTURE AEROBIC IDENTIFY: CPT | Performed by: FAMILY MEDICINE

## 2025-02-07 PROCEDURE — 81002 URINALYSIS NONAUTO W/O SCOPE: CPT | Performed by: FAMILY MEDICINE

## 2025-02-07 PROCEDURE — 87186 SC STD MICRODIL/AGAR DIL: CPT | Performed by: FAMILY MEDICINE

## 2025-02-07 PROCEDURE — 90471 IMMUNIZATION ADMIN: CPT | Performed by: FAMILY MEDICINE

## 2025-02-07 PROCEDURE — 99213 OFFICE O/P EST LOW 20 MIN: CPT | Performed by: FAMILY MEDICINE

## 2025-02-07 NOTE — PROGRESS NOTES
Name: Rajani Piper      : 1960      MRN: [id number]  Encounter Provider: Kaylen Prescott MD  Encounter Date: 2025   Encounter department: [facility]    Assessment & Plan  Encounter for immunization    Orders:  •  influenza vaccine, recombinant, PF, 0.5 mL IM (Flublok)  was advised to monitor BP at home -  going for a    Urinary problem    Orders:  •  POCT urine dip  •  Urine culture         History of Present Illness     Pt was seeing for PE at work and was told few days later that she has UTI -  asymptomatic - no prior h/o UTIs       Review of Systems   Constitutional: Negative.    Respiratory: Negative.     Cardiovascular: Negative.    Gastrointestinal: Negative.    Genitourinary: Negative.    Musculoskeletal: Negative.    Skin: Negative.    Neurological: Negative.      Past Medical History:   Diagnosis Date   • Anxiety    • Borderline hyperlipidemia    • Cancer (HCC)     Basal Cell skin ca   • Eating disorder    • HL (hearing loss) 2022   • Ovarian cyst    • Uterine fibroid    • Vertigo    • Visual impairment 14yrs     Past Surgical History:   Procedure Laterality Date   • BREAST CYST ASPIRATION      patient does not know   • HERNIA REPAIR     • ORIF WRIST FRACTURE Right 2018    Procedure: OPEN REDUCTION W/ INTERNAL FIXATION (ORIF) RADIUS / ULNA (WRIST);  Surgeon: Marv Townsend DO;  Location: [facility];  Service: Orthopedics     Family History   Problem Relation Age of Onset   • Cancer Mother         does not know what  type   • Alzheimer's disease Mother    • Breast cancer Mother    • Cancer Mother    • No Known Problems Sister    • No Known Problems Brother    • No Known Problems Paternal Aunt    • No Known Problems Paternal Uncle    • No Known Problems Maternal Grandmother    • No Known Problems Maternal Grandfather    • No Known Problems Paternal Grandmother    • No Known Problems Paternal Grandfather    • No Known Problems Daughter    • No Known  "Problems Sister    • No Known Problems Paternal Aunt    • No Known Problems Paternal Aunt    • No Known Problems Paternal Aunt    • ADD / ADHD Neg Hx    • Anesthesia problems Neg Hx    • Clotting disorder Neg Hx    • Collagen disease Neg Hx    • Diabetes Neg Hx    • Dislocations Neg Hx    • Learning disabilities Neg Hx    • Neurological problems Neg Hx    • Osteoporosis Neg Hx    • Rheumatologic disease Neg Hx    • Scoliosis Neg Hx    • Vascular Disease Neg Hx      Social History     Tobacco Use   • Smoking status: Never   • Smokeless tobacco: Never   Vaping Use   • Vaping status: Never Used   Substance and Sexual Activity   • Alcohol use: Yes     Comment: Social   • Drug use: No   • Sexual activity: Yes     Partners: Male     Birth control/protection: Post-menopausal, None     Current Outpatient Medications on File Prior to Visit   Medication Sig   • Multiple Vitamin (MULTIVITAMIN ADULT PO) Take by mouth Balance of nature     No Known Allergies  Immunization History   Administered Date(s) Administered   • COVID-19 MODERNA VACC 0.5 ML IM 01/20/2021, 02/17/2021, 11/27/2021   • Hep B, adult 07/13/2016, 08/10/2016, 12/09/2016   • INFLUENZA 10/04/2011   • Influenza Quadrivalent Preservative Free 3 years and older IM 10/07/2014, 10/10/2016   • Influenza Recombinant Preservative Free Im 02/07/2025   • Influenza, injectable, quadrivalent, preservative free 0.5 mL 10/25/2018, 12/01/2023   • Influenza, recombinant, quadrivalent,injectable, preservative free 10/14/2019, 10/20/2020, 01/21/2022   • Influenza, seasonal, injectable 12/30/2013, 10/20/2015, 10/30/2017   • Tdap 07/28/2011   • Tuberculin Skin Test-PPD Intradermal 06/28/2016     Objective   /90 (BP Location: Left arm, Patient Position: Sitting, Cuff Size: Standard)   Pulse 84   Temp 98.3 °F (36.8 °C) (Temporal)   Resp 18   Ht 5' 5\" (1.651 m)   Wt 78.9 kg (174 lb)   LMP 11/14/2017   SpO2 97%   BMI 28.96 kg/m²     Physical Exam  Constitutional:       " General: She is not in acute distress.     Appearance: She is not ill-appearing.   Cardiovascular:      Rate and Rhythm: Normal rate.   Pulmonary:      Effort: Pulmonary effort is normal. No respiratory distress.   Neurological:      Mental Status: She is alert.

## 2025-02-07 NOTE — TELEPHONE ENCOUNTER
----- Message from Ryleigh N sent at 2/7/2025  8:29 AM EST -----  Regarding: CARE GAP REQUEST  02/07/25 8:29 AM    Hello, our patient attached above has had Physical Exam completed/performed. Please assist in updating the patient chart by making an External outreach to [facility] [place] facility located in [facility]. The date of service is [id number].    Thank you,  Ryleigh Nemec [facility]

## 2025-02-07 NOTE — TELEPHONE ENCOUNTER
Upon review of the In Basket request we are requesting that you forward this request/concern to the appropriate education email address. The Quality team members assigned to this email will be more than happy to assist you.    Any additional questions or concerns should be emailed to the Practice Liaisons via the appropriate education email address, please do not reply via In Basket.    Thank you  Finn Babni MA   [facility]

## 2025-02-09 LAB — BACTERIA UR CULT: ABNORMAL

## 2025-02-10 ENCOUNTER — RESULTS FOLLOW-UP (OUTPATIENT)
Dept: FAMILY MEDICINE CLINIC | Facility: CLINIC | Age: 65
End: 2025-02-10

## 2025-02-10 DIAGNOSIS — R39.9 UTI SYMPTOMS: Primary | ICD-10-CM

## 2025-02-10 RX ORDER — NITROFURANTOIN 25; 75 MG/1; MG/1
100 CAPSULE ORAL 2 TIMES DAILY
Qty: 10 CAPSULE | Refills: 0 | Status: SHIPPED | OUTPATIENT
Start: 2025-02-10 | End: 2025-02-15

## 2025-02-18 ENCOUNTER — HOSPITAL ENCOUNTER (OUTPATIENT)
Dept: CT IMAGING | Facility: HOSPITAL | Age: 65
Discharge: HOME | End: 2025-02-18
Payer: COMMERCIAL

## 2025-02-18 DIAGNOSIS — E83.50 UNSPECIFIED DISORDER OF CALCIUM METABOLISM: ICD-10-CM

## 2025-02-18 PROCEDURE — 75571 CT HRT W/O DYE W/CA TEST: CPT

## 2025-06-12 ENCOUNTER — RA CDI HCC (OUTPATIENT)
Dept: OTHER | Facility: HOSPITAL | Age: 65
End: 2025-06-12

## 2025-06-25 ENCOUNTER — TELEPHONE (OUTPATIENT)
Age: 65
End: 2025-06-25

## 2025-06-25 DIAGNOSIS — Z12.31 ENCOUNTER FOR SCREENING MAMMOGRAM FOR BREAST CANCER: Primary | ICD-10-CM

## 2025-06-25 NOTE — TELEPHONE ENCOUNTER
Cierra from  Radiology/Breast imaging calling to request an order for patients mammo order.  She has an upcoming appointment at the [facility] in August.    Please place order in chart for Mammo screening bilateral w 3d & cad

## 2025-06-27 ENCOUNTER — OFFICE VISIT (OUTPATIENT)
Dept: FAMILY MEDICINE CLINIC | Facility: CLINIC | Age: 65
End: 2025-06-27
Payer: COMMERCIAL

## 2025-06-27 VITALS
TEMPERATURE: 97.2 F | BODY MASS INDEX: 28.68 KG/M2 | HEIGHT: 64 IN | SYSTOLIC BLOOD PRESSURE: 142 MMHG | RESPIRATION RATE: 14 BRPM | WEIGHT: 168 LBS | HEART RATE: 110 BPM | OXYGEN SATURATION: 97 % | DIASTOLIC BLOOD PRESSURE: 90 MMHG

## 2025-06-27 DIAGNOSIS — Z13.0 SCREENING FOR DEFICIENCY ANEMIA: ICD-10-CM

## 2025-06-27 DIAGNOSIS — E78.5 BORDERLINE HYPERLIPIDEMIA: ICD-10-CM

## 2025-06-27 DIAGNOSIS — Z13.29 SCREENING FOR THYROID DISORDER: ICD-10-CM

## 2025-06-27 DIAGNOSIS — Z78.0 POSTMENOPAUSAL: ICD-10-CM

## 2025-06-27 DIAGNOSIS — R03.0 ELEVATED BLOOD PRESSURE READING: ICD-10-CM

## 2025-06-27 DIAGNOSIS — E55.9 VITAMIN D DEFICIENCY: ICD-10-CM

## 2025-06-27 DIAGNOSIS — R91.1 NODULE OF LEFT LUNG: ICD-10-CM

## 2025-06-27 DIAGNOSIS — Z12.31 ENCOUNTER FOR SCREENING MAMMOGRAM FOR BREAST CANCER: ICD-10-CM

## 2025-06-27 DIAGNOSIS — Z13.1 SCREENING FOR DIABETES MELLITUS: ICD-10-CM

## 2025-06-27 DIAGNOSIS — Z00.00 PHYSICAL EXAM: Primary | ICD-10-CM

## 2025-06-27 DIAGNOSIS — Z11.59 NEED FOR HEPATITIS C SCREENING TEST: ICD-10-CM

## 2025-06-27 DIAGNOSIS — N63.21 MASS OF UPPER OUTER QUADRANT OF LEFT BREAST: ICD-10-CM

## 2025-06-27 LAB
SL AMB  POCT GLUCOSE, UA: NORMAL
SL AMB LEUKOCYTE ESTERASE,UA: NORMAL
SL AMB POCT BILIRUBIN,UA: NORMAL
SL AMB POCT BLOOD,UA: NORMAL
SL AMB POCT CLARITY,UA: CLEAR
SL AMB POCT COLOR,UA: YELLOW
SL AMB POCT KETONES,UA: NORMAL
SL AMB POCT NITRITE,UA: NORMAL
SL AMB POCT PH,UA: 6
SL AMB POCT SPECIFIC GRAVITY,UA: 1.02
SL AMB POCT URINE PROTEIN: NORMAL
SL AMB POCT UROBILINOGEN: NORMAL

## 2025-06-27 PROCEDURE — 99396 PREV VISIT EST AGE 40-64: CPT | Performed by: FAMILY MEDICINE

## 2025-06-27 PROCEDURE — 81003 URINALYSIS AUTO W/O SCOPE: CPT | Performed by: FAMILY MEDICINE

## 2025-06-27 NOTE — PROGRESS NOTES
Adult Annual Physical  Name: Rajani Piper      : 1960      MRN: [id number]  Encounter Provider: Lexis Marino MD  Encounter Date: 2025   Encounter department: [facility]    :  Assessment & Plan  Physical exam    Orders:  •  POCT urine dip auto non-scope  •  CBC; Future  •  Comprehensive metabolic panel; Future  •  Hemoglobin A1C; Future  •  Hepatitis C antibody; Future  •  Lipase; Future  •  Lipid Panel with Direct LDL reflex; Future  •  Magnesium; Future  •  TSH, 3rd generation; Future    Encounter for screening mammogram for breast cancer         Nodule of left lung  Incidental finding on CT coronary calcium score  Orders:  •  CT chest wo contrast; Future    Postmenopausal    Orders:  •  DXA bone density spine hip and pelvis; Future    Mass of upper outer quadrant of left breast    Orders:  •  Mammo diagnostic bilateral w 3d and cad; Future  •  US breast left limited (diagnostic); Future    Borderline hyperlipidemia    Orders:  •  Lipase; Future  •  Lipid Panel with Direct LDL reflex; Future  •  Magnesium; Future    Elevated blood pressure reading  Avoid NSAID/decongestant use  DASH diet       Vitamin D deficiency    Orders:  •  Vitamin D 25 hydroxy; Future    Screening for deficiency anemia    Orders:  •  CBC; Future    Screening for thyroid disorder    Orders:  •  TSH, 3rd generation; Future    Need for hepatitis C screening test    Orders:  •  Hepatitis C antibody; Future    Screening for diabetes mellitus    Orders:  •  Comprehensive metabolic panel; Future  •  Hemoglobin A1C; Future        Preventive Screenings:    - Breast cancer screening: screening up-to-date     Immunizations:  - Immunizations due: Prevnar 20, Tdap and Zoster (Shingrix)         History of Present Illness     Adult Annual Physical:  Patient presents for annual physical.     Diet and Physical Activity:  - Diet/Nutrition: no special diet.  - Exercise: walking, 3-4 times a week on average and 30-60  "minutes on average.    Depression Screening:  - PHQ-2 Score: 0    General Health:  - Sleep: 7-8 hours of sleep on average.  - Hearing:. I use hearing aids  - Vision: no vision problems.  - Dental: regular dental visits.    /GYN Health:    - Menopause: postmenopausal.   - History of STDs: no    Advanced Care Planning:  - Has an advanced directive?: yes    - Has a durable medical POA?: yes      Review of Systems  Past Medical History   Past Medical History[1]  Past Surgical History[2]  Family History[3]   reports that she has never smoked. She has never used smokeless tobacco. She reports current alcohol use. She reports that she does not use drugs.  Current Outpatient Medications   Medication Instructions   • Multiple Vitamin (MULTIVITAMIN ADULT PO) Take by mouth Balance of nature   Allergies[4]   Medications Ordered Prior to Encounter[5]   Social History[6]    Immunization History   Administered Date(s) Administered   • COVID-19 MODERNA VACC 0.5 ML IM 01/20/2021, 02/17/2021, 11/27/2021   • Hep B, adult 07/13/2016, 08/10/2016, 12/09/2016   • INFLUENZA 10/04/2011   • Influenza Quadrivalent Preservative Free 3 years and older IM 10/07/2014, 10/10/2016   • Influenza Recombinant Preservative Free Im 02/07/2025   • Influenza, injectable, quadrivalent, preservative free 0.5 mL 10/25/2018, 12/01/2023   • Influenza, recombinant, quadrivalent,injectable, preservative free 10/14/2019, 10/20/2020, 01/21/2022   • Influenza, seasonal, injectable 12/30/2013, 10/20/2015, 10/30/2017   • Tdap 07/28/2011   • Tuberculin Skin Test-PPD Intradermal 06/28/2016       Objective   /90 (BP Location: Left arm, Patient Position: Sitting, Cuff Size: Standard)   Pulse (!) 110   Temp (!) 97.2 °F (36.2 °C) (Temporal)   Resp 14   Ht 5' 4\" (1.626 m)   Wt 76.2 kg (168 lb)   LMP 11/14/2017   SpO2 97%   BMI 28.84 kg/m²     Physical Exam  Vitals and nursing note reviewed.   Constitutional:       General: She is not in acute distress.     " Appearance: Normal appearance.      Comments: OW, acutely ill   HENT:      Nose: Nose normal.      Mouth/Throat:      Pharynx: No oropharyngeal exudate or posterior oropharyngeal erythema.      Tonsils: No tonsillar abscesses.     Eyes:      General: No scleral icterus.     Conjunctiva/sclera: Conjunctivae normal.       Cardiovascular:      Rate and Rhythm: Normal rate and regular rhythm.   Pulmonary:      Effort: Pulmonary effort is normal. No respiratory distress.      Breath sounds: Normal breath sounds.   Abdominal:      General: Bowel sounds are normal.      Palpations: Abdomen is soft.      Tenderness: There is no abdominal tenderness. There is no right CVA tenderness, left CVA tenderness, guarding or rebound.     Musculoskeletal:         General: Normal range of motion.      Cervical back: Neck supple. No tenderness.      Right lower leg: No edema.      Left lower leg: No edema.     Skin:     General: Skin is warm and dry.      Coloration: Skin is not jaundiced.      Findings: No rash.     Neurological:      Mental Status: She is alert and oriented to person, place, and time. Mental status is at baseline.     Psychiatric:         Mood and Affect: Mood normal.                [1]  Past Medical History:  Diagnosis Date   • Anxiety    • Borderline hyperlipidemia    • Cancer (HCC)     Basal Cell skin ca   • Eating disorder 2022   • HL (hearing loss) 1/2022   • Ovarian cyst    • Uterine fibroid    • Vertigo    • Visual impairment 14yrs   [2]  Past Surgical History:  Procedure Laterality Date   • BREAST CYST ASPIRATION  1990    patient does not know   • HERNIA REPAIR  2011   • ORIF WRIST FRACTURE Right 11/14/2018    Procedure: OPEN REDUCTION W/ INTERNAL FIXATION (ORIF) RADIUS / ULNA (WRIST);  Surgeon: Marv Townsend DO;  Location: [facility];  Service: Orthopedics   [3]  Family History  Problem Relation Name Age of Onset   • Cancer Mother          does not know what  type   • Alzheimer's disease Mother Rajani  Ainsley    • Breast cancer Mother Rajani Schmitz    • Cancer Mother Rajani Schmitz    • No Known Problems Sister brianda    • No Known Problems Brother     • No Known Problems Paternal Aunt     • No Known Problems Paternal Uncle     • No Known Problems Maternal Grandmother     • No Known Problems Maternal Grandfather     • No Known Problems Paternal Grandmother     • No Known Problems Paternal Grandfather     • No Known Problems Daughter [place]    • No Known Problems Sister yeny    • No Known Problems Paternal Aunt     • No Known Problems Paternal Aunt     • No Known Problems Paternal Aunt     • ADD / ADHD Neg Hx     • Anesthesia problems Neg Hx     • Clotting disorder Neg Hx     • Collagen disease Neg Hx     • Diabetes Neg Hx     • Dislocations Neg Hx     • Learning disabilities Neg Hx     • Neurological problems Neg Hx     • Osteoporosis Neg Hx     • Rheumatologic disease Neg Hx     • Scoliosis Neg Hx     • Vascular Disease Neg Hx     [4]  No Known Allergies[5]  Current Outpatient Medications on File Prior to Visit   Medication Sig Dispense Refill   • Multiple Vitamin (MULTIVITAMIN ADULT PO) Take by mouth Balance of nature       No current facility-administered medications on file prior to visit.   [6]  Social History  Tobacco Use   • Smoking status: Never   • Smokeless tobacco: Never   Vaping Use   • Vaping status: Never Used   Substance and Sexual Activity   • Alcohol use: Yes     Comment: Social   • Drug use: No   • Sexual activity: Yes     Partners: Male     Birth control/protection: Post-menopausal, None

## 2025-06-27 NOTE — PATIENT INSTRUCTIONS
"Patient Education     Routine physical for adults   The Basics   Written by the doctors and editors at [facility]   What is a physical? -- A physical is a routine visit, or \"check-up,\" with your doctor. You might also hear it called a \"wellness visit\" or \"preventive visit.\"  During each visit, the doctor will:   Ask about your physical and mental health   Ask about your habits, behaviors, and lifestyle   Do an exam   Give you vaccines if needed   Talk to you about any medicines you take   Give advice about your health   Answer your questions  Getting regular check-ups is an important part of taking care of your health. It can help your doctor find and treat any problems you have. But it's also important for preventing health problems.  A routine physical is different from a \"sick visit.\" A sick visit is when you see a doctor because of a health concern or problem. Since physicals are scheduled ahead of time, you can think about what you want to ask the doctor.  How often should I get a physical? -- It depends on your age and health. In general, for people age 21 years and older:   If you are younger than 50 years, you might be able to get a physical every 3 years.   If you are 50 years or older, your doctor might recommend a physical every year.  If you have an ongoing health condition, like diabetes or high blood pressure, your doctor will probably want to see you more often.  What happens during a physical? -- In general, each visit will include:   Physical exam - The doctor or nurse will check your height, weight, heart rate, and blood pressure. They will also look at your eyes and ears. They will ask about how you are feeling and whether you have any symptoms that bother you.   Medicines - It's a good idea to bring a list of all the medicines you take to each doctor visit. Your doctor will talk to you about your medicines and answer any questions. Tell them if you are having any side effects that bother you. You " "should also tell them if you are having trouble paying for any of your medicines.   Habits and behaviors - This includes:   Your diet   Your exercise habits   Whether you smoke, drink alcohol, or use drugs   Whether you are sexually active   Whether you feel safe at home  Your doctor will talk to you about things you can do to improve your health and lower your risk of health problems. They will also offer help and support. For example, if you want to quit smoking, they can give you advice and might prescribe medicines. If you want to improve your diet or get more physical activity, they can help you with this, too.   Lab tests, if needed - The tests you get will depend on your age and situation. For example, your doctor might want to check your:   Cholesterol   Blood sugar   Iron level   Vaccines - The recommended vaccines will depend on your age, health, and what vaccines you already had. Vaccines are very important because they can prevent certain serious or deadly infections.   Discussion of screening - \"Screening\" means checking for diseases or other health problems before they cause symptoms. Your doctor can recommend screening based on your age, risk, and preferences. This might include tests to check for:   Cancer, such as breast, prostate, cervical, ovarian, colorectal, prostate, lung, or skin cancer   Sexually transmitted infections, such as chlamydia and gonorrhea   Mental health conditions like depression and anxiety  Your doctor will talk to you about the different types of screening tests. They can help you decide which screenings to have. They can also explain what the results might mean.   Answering questions - The physical is a good time to ask the doctor or nurse questions about your health. If needed, they can refer you to other doctors or specialists, too.  Adults older than 65 years often need other care, too. As you get older, your doctor will talk to you about:   How to prevent falling at " home   Hearing or vision tests   Memory testing   How to take your medicines safely   Making sure that you have the help and support you need at home  All topics are updated as new evidence becomes available and our peer review process is complete.  This topic retrieved from [employer] on: May 02, 2024.  Topic 720519 Version 1.0  Release: 32.4.3 - C32.122  © 2024 [employer] and/or its affiliates. All rights reserved.  Consumer Information Use and Disclaimer   Disclaimer: This generalized information is a limited summary of diagnosis, treatment, and/or medication information. It is not meant to be comprehensive and should be used as a tool to help the user understand and/or assess potential diagnostic and treatment options. It does NOT include all information about conditions, treatments, medications, side effects, or risks that may apply to a specific patient. It is not intended to be medical advice or a substitute for the medical advice, diagnosis, or treatment of a health care provider based on the health care provider's examination and assessment of a patient's specific and unique circumstances. Patients must speak with a health care provider for complete information about their health, medical questions, and treatment options, including any risks or benefits regarding use of medications. This information does not endorse any treatments or medications as safe, effective, or approved for treating a specific patient. [employer] and its affiliates disclaim any warranty or liability relating to this information or the use thereof.The use of this information is governed by the Terms of Use, available at https://www.woltersSketchfabuwer.com/en/know/clinical-effectiveness-terms. 2024© [employer] and its affiliates and/or licensors. All rights reserved.  Copyright   © 2024 [employer] and/or its affiliates. All rights reserved.

## 2025-07-02 ENCOUNTER — APPOINTMENT (OUTPATIENT)
Dept: LAB | Facility: HOSPITAL | Age: 65
End: 2025-07-02

## 2025-07-02 ENCOUNTER — HOSPITAL ENCOUNTER (OUTPATIENT)
Dept: RADIOLOGY | Facility: HOSPITAL | Age: 65
Discharge: HOME | End: 2025-07-02
Attending: FAMILY MEDICINE
Payer: COMMERCIAL

## 2025-07-02 VITALS — BODY MASS INDEX: 20.83 KG/M2 | HEIGHT: 65 IN | WEIGHT: 125 LBS

## 2025-07-02 DIAGNOSIS — Z00.00 PHYSICAL EXAM: ICD-10-CM

## 2025-07-02 DIAGNOSIS — Z11.59 NEED FOR HEPATITIS C SCREENING TEST: ICD-10-CM

## 2025-07-02 DIAGNOSIS — E55.9 VITAMIN D DEFICIENCY: ICD-10-CM

## 2025-07-02 DIAGNOSIS — Z13.0 SCREENING FOR DEFICIENCY ANEMIA: ICD-10-CM

## 2025-07-02 DIAGNOSIS — E78.5 BORDERLINE HYPERLIPIDEMIA: ICD-10-CM

## 2025-07-02 DIAGNOSIS — R91.1 NODULE OF LEFT LUNG: ICD-10-CM

## 2025-07-02 DIAGNOSIS — Z13.1 SCREENING FOR DIABETES MELLITUS: ICD-10-CM

## 2025-07-02 DIAGNOSIS — Z13.29 SCREENING FOR THYROID DISORDER: ICD-10-CM

## 2025-07-02 DIAGNOSIS — Z78.0 POSTMENOPAUSAL: ICD-10-CM

## 2025-07-02 LAB
25(OH)D3 SERPL-MCNC: 54.1 NG/ML (ref 30–100)
ALBUMIN SERPL BCG-MCNC: 4.2 G/DL (ref 3.5–5)
ALP SERPL-CCNC: 68 U/L (ref 34–104)
ALT SERPL W P-5'-P-CCNC: 12 U/L (ref 7–52)
ANION GAP SERPL CALCULATED.3IONS-SCNC: 6 MMOL/L (ref 4–13)
AST SERPL W P-5'-P-CCNC: 15 U/L (ref 13–39)
BILIRUB SERPL-MCNC: 0.62 MG/DL (ref 0.2–1)
BUN SERPL-MCNC: 13 MG/DL (ref 5–25)
CALCIUM SERPL-MCNC: 8.9 MG/DL (ref 8.4–10.2)
CHLORIDE SERPL-SCNC: 100 MMOL/L (ref 96–108)
CHOLEST SERPL-MCNC: 173 MG/DL (ref ?–200)
CO2 SERPL-SCNC: 29 MMOL/L (ref 21–32)
CREAT SERPL-MCNC: 0.56 MG/DL (ref 0.6–1.3)
ERYTHROCYTE [DISTWIDTH] IN BLOOD BY AUTOMATED COUNT: 12 % (ref 11.6–15.1)
EST. AVERAGE GLUCOSE BLD GHB EST-MCNC: 105 MG/DL
GFR SERPL CREATININE-BSD FRML MDRD: 99 ML/MIN/1.73SQ M
GLUCOSE P FAST SERPL-MCNC: 93 MG/DL (ref 65–99)
HBA1C MFR BLD: 5.3 %
HCT VFR BLD AUTO: 39.3 % (ref 34.8–46.1)
HCV AB SER QL: NORMAL
HDLC SERPL-MCNC: 53 MG/DL
HGB BLD-MCNC: 12.9 G/DL (ref 11.5–15.4)
LDLC SERPL CALC-MCNC: 105 MG/DL (ref 0–100)
LIPASE SERPL-CCNC: 16 U/L (ref 11–82)
MAGNESIUM SERPL-MCNC: 2.1 MG/DL (ref 1.9–2.7)
MCH RBC QN AUTO: 31.3 PG (ref 26.8–34.3)
MCHC RBC AUTO-ENTMCNC: 32.8 G/DL (ref 31.4–37.4)
MCV RBC AUTO: 95 FL (ref 82–98)
PLATELET # BLD AUTO: 262 THOUSANDS/UL (ref 149–390)
PMV BLD AUTO: 9 FL (ref 8.9–12.7)
POTASSIUM SERPL-SCNC: 3.8 MMOL/L (ref 3.5–5.3)
PROT SERPL-MCNC: 6.9 G/DL (ref 6.4–8.4)
RBC # BLD AUTO: 4.12 MILLION/UL (ref 3.81–5.12)
SODIUM SERPL-SCNC: 135 MMOL/L (ref 135–147)
TRIGL SERPL-MCNC: 75 MG/DL (ref ?–150)
TSH SERPL DL<=0.05 MIU/L-ACNC: 1.41 UIU/ML (ref 0.45–4.5)
WBC # BLD AUTO: 6.17 THOUSAND/UL (ref 4.31–10.16)

## 2025-07-02 PROCEDURE — 86803 HEPATITIS C AB TEST: CPT

## 2025-07-02 PROCEDURE — 36415 COLL VENOUS BLD VENIPUNCTURE: CPT

## 2025-07-02 PROCEDURE — 71250 CT THORAX DX C-: CPT

## 2025-07-02 PROCEDURE — 80061 LIPID PANEL: CPT

## 2025-07-02 PROCEDURE — 85027 COMPLETE CBC AUTOMATED: CPT

## 2025-07-02 PROCEDURE — 83735 ASSAY OF MAGNESIUM: CPT

## 2025-07-02 PROCEDURE — 82306 VITAMIN D 25 HYDROXY: CPT

## 2025-07-02 PROCEDURE — 83690 ASSAY OF LIPASE: CPT

## 2025-07-02 PROCEDURE — 83036 HEMOGLOBIN GLYCOSYLATED A1C: CPT

## 2025-07-02 PROCEDURE — 77080 DXA BONE DENSITY AXIAL: CPT

## 2025-07-02 PROCEDURE — 80053 COMPREHEN METABOLIC PANEL: CPT

## 2025-07-02 PROCEDURE — 84443 ASSAY THYROID STIM HORMONE: CPT

## 2025-07-11 ENCOUNTER — OFFICE VISIT (OUTPATIENT)
Dept: FAMILY MEDICINE CLINIC | Facility: CLINIC | Age: 65
End: 2025-07-11
Payer: COMMERCIAL

## 2025-07-11 ENCOUNTER — TELEPHONE (OUTPATIENT)
Dept: FAMILY MEDICINE CLINIC | Facility: CLINIC | Age: 65
End: 2025-07-11

## 2025-07-11 VITALS
OXYGEN SATURATION: 98 % | TEMPERATURE: 97.2 F | DIASTOLIC BLOOD PRESSURE: 90 MMHG | HEART RATE: 82 BPM | WEIGHT: 166 LBS | HEIGHT: 65 IN | SYSTOLIC BLOOD PRESSURE: 130 MMHG | RESPIRATION RATE: 14 BRPM | BODY MASS INDEX: 27.66 KG/M2

## 2025-07-11 DIAGNOSIS — R91.1 LUNG NODULE SEEN ON IMAGING STUDY: Primary | ICD-10-CM

## 2025-07-11 DIAGNOSIS — R91.1 NODULE OF LOWER LOBE OF LEFT LUNG: ICD-10-CM

## 2025-07-11 PROCEDURE — 99213 OFFICE O/P EST LOW 20 MIN: CPT | Performed by: FAMILY MEDICINE

## 2025-07-11 NOTE — TELEPHONE ENCOUNTER
----- Message from Lexis Marino MD sent at 7/10/2025 10:23 PM EDT -----  Please add pt to my schedule on Friday to discuss CT chest results--can be virtual--  Can double book at 1pm or can put at 2pm same day if not filled-otherwise end of day  thanks

## 2025-08-06 ENCOUNTER — CONSULT (OUTPATIENT)
Dept: PULMONOLOGY | Facility: MEDICAL CENTER | Age: 65
End: 2025-08-06
Payer: COMMERCIAL

## 2025-08-06 VITALS
HEIGHT: 65 IN | OXYGEN SATURATION: 99 % | SYSTOLIC BLOOD PRESSURE: 144 MMHG | BODY MASS INDEX: 27.32 KG/M2 | TEMPERATURE: 99.1 F | WEIGHT: 164 LBS | HEART RATE: 89 BPM | DIASTOLIC BLOOD PRESSURE: 72 MMHG | RESPIRATION RATE: 12 BRPM

## 2025-08-06 DIAGNOSIS — R91.1 NODULE OF LOWER LOBE OF LEFT LUNG: ICD-10-CM

## 2025-08-06 DIAGNOSIS — R91.1 LUNG NODULE SEEN ON IMAGING STUDY: ICD-10-CM

## 2025-08-06 PROCEDURE — 99204 OFFICE O/P NEW MOD 45 MIN: CPT | Performed by: INTERNAL MEDICINE

## 2025-08-07 ENCOUNTER — TELEPHONE (OUTPATIENT)
Age: 65
End: 2025-08-07

## 2025-08-20 ENCOUNTER — HOSPITAL ENCOUNTER (OUTPATIENT)
Dept: RADIOLOGY | Facility: HOSPITAL | Age: 65
Discharge: HOME | End: 2025-08-20
Attending: FAMILY MEDICINE
Payer: COMMERCIAL

## 2025-08-20 VITALS — WEIGHT: 160 LBS | HEIGHT: 65 IN | BODY MASS INDEX: 26.66 KG/M2

## 2025-08-20 DIAGNOSIS — N63.21 MASS OF UPPER OUTER QUADRANT OF LEFT BREAST: ICD-10-CM

## 2025-08-20 PROCEDURE — 76642 ULTRASOUND BREAST LIMITED: CPT

## 2025-08-20 PROCEDURE — 77066 DX MAMMO INCL CAD BI: CPT

## 2025-08-20 PROCEDURE — G0279 TOMOSYNTHESIS, MAMMO: HCPCS

## 2025-08-21 ENCOUNTER — HOSPITAL ENCOUNTER (OUTPATIENT)
Dept: RADIOLOGY | Age: 65
Discharge: HOME | End: 2025-08-21
Attending: INTERNAL MEDICINE
Payer: COMMERCIAL

## 2025-08-21 DIAGNOSIS — R91.1 NODULE OF LOWER LOBE OF LEFT LUNG: ICD-10-CM

## 2025-08-21 LAB — GLUCOSE SERPL-MCNC: 106 MG/DL (ref 65–140)

## 2025-08-21 PROCEDURE — A9552 F18 FDG: HCPCS

## 2025-08-21 PROCEDURE — 78815 PET IMAGE W/CT SKULL-THIGH: CPT

## 2025-08-21 PROCEDURE — 82948 REAGENT STRIP/BLOOD GLUCOSE: CPT

## (undated) DEVICE — PAD CAST 3 IN COTTON NON STRL

## (undated) DEVICE — NEEDLE 25G X 1 1/2

## (undated) DEVICE — GLOVE INDICATOR PI UNDERGLOVE SZ 7.5 BLUE

## (undated) DEVICE — SUT VICRYL 4-0 P-3 18 IN J494G

## (undated) DEVICE — PAD CAST 4 IN COTTON NON STERILE

## (undated) DEVICE — DRAPE C-ARM X-RAY

## (undated) DEVICE — 1.25MM KIRSCHNER WIRE W/TROCAR POINT 150MM
Type: IMPLANTABLE DEVICE | Site: WRIST | Status: NON-FUNCTIONAL
Removed: 2018-11-14

## (undated) DEVICE — ACE WRAP 3 IN VELCRO LATEX FREE

## (undated) DEVICE — SUT VICRYL 3-0 SH 27 IN J416H

## (undated) DEVICE — SUT ETHILON 4-0 PS-2 18 IN 1667G

## (undated) DEVICE — STOCKINETTE REGULAR

## (undated) DEVICE — 1.5MM DRILL BIT W/DEPTH MARK MINI QC/96 MM

## (undated) DEVICE — GLOVE SRG BIOGEL 7.5

## (undated) DEVICE — CAST PADDING 4 IN STERILE

## (undated) DEVICE — INTENDED FOR TISSUE SEPARATION, AND OTHER PROCEDURES THAT REQUIRE A SHARP SURGICAL BLADE TO PUNCTURE OR CUT.: Brand: BARD-PARKER SAFETY BLADES SIZE 15, STERILE

## (undated) DEVICE — BASIC DOUBLE BASIN 2-LF: Brand: MEDLINE INDUSTRIES, INC.

## (undated) DEVICE — COBAN 4 IN STERILE

## (undated) DEVICE — FABRIC REINFORCED, SURGICAL GOWN, XL: Brand: CONVERTORS

## (undated) DEVICE — BRUSH EZ SCRUB PCMX W/NAIL CLEANER

## (undated) DEVICE — CURITY NON-ADHERENT STRIPS: Brand: CURITY

## (undated) DEVICE — SUT PROLENE 4-0 PS-2 18 IN 8682G

## (undated) DEVICE — OCCLUSIVE GAUZE STRIP,3% BISMUTH TRIBROMOPHENATE IN PETROLATUM BLEND: Brand: XEROFORM

## (undated) DEVICE — SYRINGE 10ML LL CONTROL TOP

## (undated) DEVICE — PACK GENERAL LF

## (undated) DEVICE — CUFF TOURNIQUET 18 X 4 IN QUICK CONNECT DISP 1 BLADDER

## (undated) DEVICE — SCD SEQUENTIAL COMPRESSION COMFORT SLEEVE MEDIUM KNEE LENGTH: Brand: KENDALL SCD

## (undated) DEVICE — SUT VICRYL 5-0 RAPIDE VR844

## (undated) DEVICE — TIBURON EXTREMITY SHEET: Brand: CONVERTORS

## (undated) DEVICE — DRAPE SHEET THREE QUARTER

## (undated) DEVICE — BANDAGE, ESMARK LF STR 6"X9' (20/CS): Brand: CYPRESS

## (undated) DEVICE — CAST PADDING 3 IN UNSTERILE

## (undated) DEVICE — LABEL MEDICATION STERILE 2 YELLOW LIDOCAINE 2 BLUE MARCAINE 2 ORANGE HEPARIN